# Patient Record
Sex: MALE | Race: BLACK OR AFRICAN AMERICAN | NOT HISPANIC OR LATINO | Employment: OTHER | URBAN - METROPOLITAN AREA
[De-identification: names, ages, dates, MRNs, and addresses within clinical notes are randomized per-mention and may not be internally consistent; named-entity substitution may affect disease eponyms.]

---
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
## 2020-02-23 ENCOUNTER — HEALTH MAINTENANCE LETTER (OUTPATIENT)
Age: 20
End: 2020-02-23

## 2020-12-06 ENCOUNTER — HEALTH MAINTENANCE LETTER (OUTPATIENT)
Age: 20
End: 2020-12-06

## 2021-04-11 ENCOUNTER — HEALTH MAINTENANCE LETTER (OUTPATIENT)
Age: 21
End: 2021-04-11

## 2021-06-09 ENCOUNTER — THERAPY VISIT (OUTPATIENT)
Dept: PHYSICAL THERAPY | Facility: CLINIC | Age: 21
End: 2021-06-09
Payer: COMMERCIAL

## 2021-06-09 DIAGNOSIS — M99.05 SOMATIC DYSFUNCTION OF PELVIS REGION: ICD-10-CM

## 2021-06-09 PROCEDURE — 97110 THERAPEUTIC EXERCISES: CPT | Mod: GP | Performed by: PHYSICAL THERAPIST

## 2021-06-09 PROCEDURE — 97112 NEUROMUSCULAR REEDUCATION: CPT | Mod: GP | Performed by: PHYSICAL THERAPIST

## 2021-06-09 PROCEDURE — 97161 PT EVAL LOW COMPLEX 20 MIN: CPT | Mod: GP | Performed by: PHYSICAL THERAPIST

## 2021-06-09 NOTE — LETTER
[facility]  [address]  [facility] [address]  [phone]    Suma 10, 2021    Re: Octavio Kirkpatrick   :   2000  MRN:  [id number]   REFERRING PHYSICIAN:   Yef[facility]  Date of Initial Evaluation:  2021  Visits:  Rxs Used: 6  Reason for Referral:  Somatic dysfunction of pelvis region    EVALUATION SUMMARY  Physical Therapy Initial Evaluation  Subjective:  Onset of pelvic pain, urinary urgency/frequency 3-21 of unknown etiology. Pt notes intermittent pain  with urinating. Bowel movements, erections and ejaculation are pain free. Pt does note increased muscle tone in the pelvic floor and decreased flexibility in the hips and lower extremities. Pt has noted decrease in symptoms with medication.   The history is provided by the patient. No  was used.     Patient Health History  Octavio Kirkpatrick being seen for pelvic floor therapy.   Problem began: 3/27/2021.   Problem occurred: health issues   Pain is reported as 5/10 on pain scale.  General health as reported by patient is good.  Pertinent medical history includes: chest pain and mental illness. Other medical history details: PE.   Medical allergies: other. Other medical allergies details: Red dye.   Surgeries include:  Other. Other surgery history details: Hernia, gum surgery , tubes in ears tonsils surgery.    Current medications:  Anti-depressants, heparin/coumadin and high blood pressure medication.    Current occupation is done with school to continue on.   Primary job tasks include:  Computer work.     Therapist Generated HPI Evaluation  Type of problem:  Pelvic dysfunction.  This is a new condition.  Condition occurred with:  Insidious onset.  Where condition occurred: for unknown reasons.  Patient reports pain:  Groin.  Pain is described as aching and burning   Radiates to: right testicle and tip of the penis. Pain is the  same all the time.  Since onset symptoms are gradually improving.  Exacerbated by: urinating.  and relieved by rest.      Re: Octavio Kirkpatrick   :   2000    Work activity restrictions: student, recently graduated, not working.  Barriers include:  None as reported by patient.    Objective:  Pelvic Dysfunction Evaluation:      Flexibility:    Tightness present at:Adductors; Iliopsoas; Hamstrings and Piriformis    Abdominal Wall:    Trigger Points:  Iliopsoas and external obliques    External Assessment:    Skin Condition:  Normal  Bearing Down/Coughing:  Normal    Internal Assessment:    Contraction/Grade:  Fair squeeze, definite lift (3)    SEMG Biofeedback:    Equipment:  Biofeedback  Suraface electrode placement--Perianal:  Bilateral   Baseline EMG PM:  15 mV pt was able to decrease resting muscle tone to 3 mV with guided breathing and relaxation exercises    Additional History:  Caffeine Consumption:  0       Assessment/Plan:    Patient is a 20 year old male with pelvic complaints.    Patient has the following significant findings with corresponding treatment plan.                Diagnosis 1:  Pelvic floor dysfunction  Pain -  manual therapy, self management, education and home program  Decreased ROM/flexibility - manual therapy, therapeutic exercise, therapeutic activity and home program  Impaired muscle performance - biofeedback, neuro re-education and home program                                Re: Octavio Kirkpatrick   :   2000        Therapy Evaluation Codes:   1) History comprised of:   Personal factors that impact the plan of care:      None.    Comorbidity factors that impact the plan of care are:      Chest pain, Mental illness and history of pulmonary embolism.     Medications impacting care: Anti-depressant, High blood pressure and Heparin/coumadin.  2) Examination of Body Systems comprised of:   Body structures and functions that impact the plan of care:      Pelvis.   Activity  limitations that impact the plan of care are:      urinating.  3) Clinical presentation characteristics are:   Stable/Uncomplicated.  4) Decision-Making    Low complexity using standardized patient assessment instrument and/or measureable assessment of functional outcome.  Cumulative Therapy Evaluation is: Low complexity.    Previous and current functional limitations:  (See Goal Flow Sheet for this information)    Short term and Long term goals: (See Goal Flow Sheet for this information)   Communication ability:  Patient appears to be able to clearly communicate and understand verbal and written communication and follow directions correctly.  Treatment Explanation - The following has been discussed with the patient:   RX ordered/plan of care  Anticipated outcomes  Possible risks and side effects  This patient would benefit from PT intervention to resume normal activities.   Rehab potential is good.    Frequency:  1 X week, once daily  Duration:  for 6 weeks  Discharge Plan:  Achieve all LTG.  Independent in home treatment program.  Reach maximal therapeutic benefit.    Please refer to the daily flowsheet for treatment today, total treatment time and time spent performing 1:1 timed codes.     Thank you for your referral.    INQUIRIES  Therapist: Demar Loaiza MD   [facility]  [address] [facility] [address]  Phone: [phone]  Fax: [phone]

## 2021-06-09 NOTE — PROGRESS NOTES
Physical Therapy Initial Evaluation  Subjective:    Onset of pelvic pain, urinary urgency/frequency 3-21 of unknown etiology. Pt notes intermittent pain  with urinating. Bowel movements, erections and ejaculation are pain free. Pt does note increased muscle tone in the pelvic floor and decreased flexibility in the hips and lower extremities. Pt has noted decrease in symptoms with medication.     The history is provided by the patient. No  was used.   Patient Health History  Octavio Kirkpatrick being seen for pelvic floor therapy.     Problem began: 3/27/2021.   Problem occurred: health issues   Pain is reported as 5/10 on pain scale.  General health as reported by patient is good.  Pertinent medical history includes: chest pain and mental illness. Other medical history details: PE.     Medical allergies: other. Other medical allergies details: Red dye.   Surgeries include:  Other. Other surgery history details: Hernia, gum surgery , tubes in ears tonsils surgery.    Current medications:  Anti-depressants, heparin/coumadin and high blood pressure medication.    Current occupation is done with school to continue on.   Primary job tasks include:  Computer work.                  Therapist Generated HPI Evaluation         Type of problem:  Pelvic dysfunction.    This is a new condition.  Condition occurred with:  Insidious onset.  Where condition occurred: for unknown reasons.  Patient reports pain:  Groin.  Pain is described as aching and burning   Radiates to: right testicle and tip of the penis. Pain is the same all the time.  Since onset symptoms are gradually improving.  Exacerbated by: urinating.  and relieved by rest.      Work activity restrictions: student, recently graduated, not working.  Barriers include:  None as reported by patient.                        Objective:  System                                 Pelvic Dysfunction Evaluation:        Flexibility:    Tightness present at:Adductors;  Iliopsoas; Hamstrings and Piriformis    Abdominal Wall:      Trigger Points:  Iliopsoas and external obliques          External Assessment:    Skin Condition:  Normal    Bearing Down/Coughing:  Normal        Internal Assessment:      Contraction/Grade:  Fair squeeze, definite lift (3)          SEMG Biofeedback:    Equipment:  Biofeedback    Suraface electrode placement--Perianal:  Bilateral   Baseline EMG PM:  15 mV pt was able to decrease resting muscle tone to 3 mV with guided breathing and relaxation exercises          Additional History:        Caffeine Consumption:  0                     General     ROS    Assessment/Plan:    Patient is a 20 year old male with pelvic complaints.    Patient has the following significant findings with corresponding treatment plan.                Diagnosis 1:  Pelvic floor dysfunction  Pain -  manual therapy, self management, education and home program  Decreased ROM/flexibility - manual therapy, therapeutic exercise, therapeutic activity and home program  Impaired muscle performance - biofeedback, neuro re-education and home program    Therapy Evaluation Codes:   1) History comprised of:   Personal factors that impact the plan of care:      None.    Comorbidity factors that impact the plan of care are:      Chest pain, Mental illness and history of pulmonary embolism.     Medications impacting care: Anti-depressant, High blood pressure and Heparin/coumadin.  2) Examination of Body Systems comprised of:   Body structures and functions that impact the plan of care:      Pelvis.   Activity limitations that impact the plan of care are:      urinating.  3) Clinical presentation characteristics are:   Stable/Uncomplicated.  4) Decision-Making    Low complexity using standardized patient assessment instrument and/or measureable assessment of functional outcome.  Cumulative Therapy Evaluation is: Low complexity.    Previous and current functional limitations:  (See Goal Flow Sheet for  this information)    Short term and Long term goals: (See Goal Flow Sheet for this information)     Communication ability:  Patient appears to be able to clearly communicate and understand verbal and written communication and follow directions correctly.  Treatment Explanation - The following has been discussed with the patient:   RX ordered/plan of care  Anticipated outcomes  Possible risks and side effects  This patient would benefit from PT intervention to resume normal activities.   Rehab potential is good.    Frequency:  1 X week, once daily  Duration:  for 6 weeks  Discharge Plan:  Achieve all LTG.  Independent in home treatment program.  Reach maximal therapeutic benefit.    Please refer to the daily flowsheet for treatment today, total treatment time and time spent performing 1:1 timed codes.

## 2021-06-17 ENCOUNTER — THERAPY VISIT (OUTPATIENT)
Dept: PHYSICAL THERAPY | Facility: CLINIC | Age: 21
End: 2021-06-17
Payer: COMMERCIAL

## 2021-06-17 DIAGNOSIS — M99.05 SOMATIC DYSFUNCTION OF PELVIS REGION: ICD-10-CM

## 2021-06-17 PROCEDURE — 97110 THERAPEUTIC EXERCISES: CPT | Mod: GP | Performed by: PHYSICAL THERAPIST

## 2021-06-17 PROCEDURE — 97112 NEUROMUSCULAR REEDUCATION: CPT | Mod: GP | Performed by: PHYSICAL THERAPIST

## 2021-06-23 ENCOUNTER — NURSE TRIAGE (OUTPATIENT)
Dept: NURSING | Facility: CLINIC | Age: 21
End: 2021-06-23

## 2021-06-23 ENCOUNTER — THERAPY VISIT (OUTPATIENT)
Dept: PHYSICAL THERAPY | Facility: CLINIC | Age: 21
End: 2021-06-23
Payer: COMMERCIAL

## 2021-06-23 DIAGNOSIS — M99.05 SOMATIC DYSFUNCTION OF PELVIS REGION: ICD-10-CM

## 2021-06-23 PROCEDURE — 97110 THERAPEUTIC EXERCISES: CPT | Mod: GP | Performed by: PHYSICAL THERAPIST

## 2021-06-23 PROCEDURE — 97112 NEUROMUSCULAR REEDUCATION: CPT | Mod: GP | Performed by: PHYSICAL THERAPIST

## 2021-06-23 NOTE — TELEPHONE ENCOUNTER
Triage Call:    He is on Warfarin and he took x2 ibuprophen yesterday.  He is concerned about more bleeding based on what he read online     Pt was advised of protocol recommendation/disposition of homecare and monitoring for any bleeding.  Advised to call back if does have any bleeding occur. .     Camille Barnes RN on 6/23/2021 at 5:40 PM        COVID 19 Nurse Triage Plan/Patient Instructions    Please be aware that novel coronavirus (COVID-19) may be circulating in the community. If you develop symptoms such as fever, cough, or SOB or if you have concerns about the presence of another infection including coronavirus (COVID-19), please contact your health care provider or visit [email or web address].     Disposition/Instructions    Home care recommended. Follow home care protocol based instructions.    Thank you for taking steps to prevent the spread of this virus.  o Limit your contact with others.  o Wear a simple mask to cover your cough.  o Wash your hands well and often.    Resources    [facility]: About COVID-19: [email or web address]    CDC: What to Do If You're Sick: www.cdc.gov/coronavirus/2019-ncov/about/steps-when-sick.html    CDC: Ending Home Isolation: www.cdc.gov/coronavirus/2019-ncov/hcp/disposition-in-home-patients.html     CDC: Caring for Someone: www.cdc.gov/coronavirus/2019-ncov/if-you-are-sick/care-for-someone.html     [facility]: Interim Guidance for Hospital Discharge to Home: www.health.[facility].[place].us/diseases/coronavirus/hcp/hospdischarge.pdf    [facility] clinical trials (COVID-19 research studies): [email or web address]/n-clinical-trials     Below are the COVID-19 hotlines at the [facility] ([facility]). Interpreters are available.   o For health questions: Call 794-861-5394 or 1-558.429.1859 (7 a.m. to 7 p.m.)  o For questions about schools and childcare: Call 829-281-6808 or 1-705.431.7836 (7 a.m. to 7 p.m.)                   Reason for Disposition     "Caller has medication question only, adult not sick, and triager answers question    Additional Information    Negative: Drug overdose and triager unable to answer question    Negative: Caller requesting information unrelated to medicine    Negative: Caller requesting a prescription for Strep throat and has a positive culture result    Negative: Rash while taking a medication or within 3 days of stopping it    Negative: Immunization reaction suspected    Negative: [1] Asthma and [2] having symptoms of asthma (cough, wheezing, etc.)    Negative: [1] Influenza symptoms AND [2] anti-viral med prescription request, such as Tamiflu    Negative: [1] Symptom of illness (e.g., headache, abdominal pain, earache, vomiting) AND [2] more than mild    Negative: MORE THAN A DOUBLE DOSE of a prescription or over-the-counter (OTC) drug    Negative: [1] DOUBLE DOSE (an extra dose or lesser amount) of over-the-counter (OTC) drug AND [2] any symptoms (e.g., dizziness, nausea, pain, sleepiness)    Negative: [1] DOUBLE DOSE (an extra dose or lesser amount) of prescription drug AND [2] any symptoms (e.g., dizziness, nausea, pain, sleepiness)    Negative: Took another person's prescription drug    Negative: [1] DOUBLE DOSE (an extra dose or lesser amount) of prescription drug AND [2] NO symptoms (Exception: a double dose of antibiotics)    Negative: Diabetes drug error or overdose (e.g., took wrong type of insulin or took extra dose)    Negative: [1] Request for URGENT new prescription or refill of \"essential\" medication (i.e., likelihood of harm to patient if not taken) AND [2] triager unable to fill per unit policy    Negative: [1] Prescription not at pharmacy AND [2] was prescribed by PCP recently    Negative: [1] Pharmacy calling with prescription questions AND [2] triager unable to answer question    Negative: [1] Caller has URGENT medication question about med that PCP or specialist prescribed AND [2] triager unable to answer " question    Negative: [1] Caller has NON-URGENT medication question about med that PCP prescribed AND [2] triager unable to answer question    Negative: [1] Caller requesting a NON-URGENT new prescription or refill AND [2] triager unable to refill per unit policy    Negative: [1] Caller has medication question about med not prescribed by PCP AND [2] triager unable to answer question (e.g., compatibility with other med, storage)    Negative: Caller requesting a CONTROLLED substance prescription refill (e.g., narcotics, ADHD medicines)    Negative: Caller wants to use a complementary or alternative medicine    Negative: [1] Prescription prescribed recently is not at pharmacy AND [2] triager has access to patient's EMR AND [3] prescription is recorded in the EMR    Negative: [1] DOUBLE DOSE (an extra dose or lesser amount) of over-the-counter (OTC) drug AND [2] NO symptoms    Negative: [1] DOUBLE DOSE (an extra dose or lesser amount) of antibiotic drug AND [2] NO symptoms    Protocols used: MEDICATION QUESTION CALL-A-AH

## 2021-06-23 NOTE — PROGRESS NOTES
Subjective:  HPI  Physical Exam                    Objective:  System    Physical Exam    General     ROS    Assessment/Plan:    SUBJECTIVE  Subjective changes as noted by pt:  Pt notes decreased pain and decrease in frequency of urination     Current pain level: 2/10     Changes in function:  Pt notes decrease in the frequency of urination      Adverse reaction to treatment or activity:  None    OBJECTIVE  Changes in objective findings:  Pt demonstrates improved flexibility in the hamstrings, hip abductors and piriformis . Pt demonstrates improved ability to relax pelvic floor muscles        ASSESSMENT  Octavio continues to require intervention to meet STG and LTG's: PT  Patient's symptoms are resolving.  Response to therapy has shown an improvement in  pain level and pelvic tightness   Progress made towards STG/LTG?  Yes,     PLAN  Current treatment program is being advanced to more complex exercises.    PTA/ATC plan:  N/A    Please refer to the daily flowsheet for treatment today, total treatment time and time spent performing 1:1 timed codes.

## 2021-07-07 ENCOUNTER — THERAPY VISIT (OUTPATIENT)
Dept: PHYSICAL THERAPY | Facility: CLINIC | Age: 21
End: 2021-07-07
Payer: COMMERCIAL

## 2021-07-07 DIAGNOSIS — M99.05 SOMATIC DYSFUNCTION OF PELVIS REGION: ICD-10-CM

## 2021-07-07 PROCEDURE — 97110 THERAPEUTIC EXERCISES: CPT | Mod: GP | Performed by: PHYSICAL THERAPIST

## 2021-07-07 PROCEDURE — 97112 NEUROMUSCULAR REEDUCATION: CPT | Mod: GP | Performed by: PHYSICAL THERAPIST

## 2021-07-07 NOTE — PROGRESS NOTES
Subjective:  HPI  Physical Exam                    Objective:  System    Physical Exam    General     ROS    Assessment/Plan:    DISCHARGE REPORT    Progress reporting period is from 6-9-21 to 7-7-21.       SUBJECTIVE  Subjective changes noted by patient:  Pt notes decreased pain and increased flexibility in the hips and pelvic region.       Current pain level is 0/10  .     Previous pain level was  5/10  .   Changes in function:  Pt denies pain with urination, bowel movements, erections and ejaculation. Pt reports being pain free with all ADL's  Adverse reaction to treatment or activity: None    OBJECTIVE  Changes noted in objective findings:  Pt demonstrates improved flexibility in hip flexors, hamstrings , hip adductors and piriformis but still remains tighter than normal. Pt was able to to decrease the resting muscle tone with biofeedback from 15 mV to 2 mV        ASSESSMENT/PLAN  Updated problem list and treatment plan: Diagnosis 1:  Pelvic pain  Pain -  self management, education, home program and biofeedback  Decreased ROM/flexibility - therapeutic exercise, therapeutic activity and home program  Impaired muscle performance - biofeedback, neuro re-education and home program  STG/LTGs have been met or progress has been made towards goals:  Yes,   Assessment of Progress: The patient's condition is improving.  Self Management Plans:  Patient has been instructed in a home treatment program.  I have re-evaluated this patient and find that the nature, scope, duration and intensity of the therapy is appropriate for the medical condition of the patient.  Octavio continues to require the following intervention to meet STG and LTG's:  PT intervention is no longer required to meet STG/LTG.    Recommendations:  This patient is ready to be discharged from therapy and continue their home treatment program.    Please refer to the daily flowsheet for treatment today, total treatment time and time spent performing 1:1 timed  codes.

## 2021-07-22 ENCOUNTER — OFFICE VISIT (OUTPATIENT)
Dept: URGENT CARE | Facility: URGENT CARE | Age: 21
End: 2021-07-22
Payer: COMMERCIAL

## 2021-07-22 VITALS
TEMPERATURE: 97.8 F | HEART RATE: 82 BPM | WEIGHT: 223.2 LBS | DIASTOLIC BLOOD PRESSURE: 60 MMHG | OXYGEN SATURATION: 96 % | BODY MASS INDEX: 31.13 KG/M2 | RESPIRATION RATE: 16 BRPM | SYSTOLIC BLOOD PRESSURE: 120 MMHG

## 2021-07-22 DIAGNOSIS — N48.89 SORE OF PENIS: Primary | ICD-10-CM

## 2021-07-22 PROCEDURE — 99203 OFFICE O/P NEW LOW 30 MIN: CPT | Performed by: STUDENT IN AN ORGANIZED HEALTH CARE EDUCATION/TRAINING PROGRAM

## 2021-07-22 NOTE — PROGRESS NOTES
Patient presents with:  Sore      Clinical Decision Making:      ICD-10-CM    1. Sore of penis  N48.89      As he is not and has not been sexually active in the past I will not obtain an STI work-up. Exam is also in-consistent with an ulcer but rather an area of redness that appears to be 2/2 friction. Will treat with lubrication like Vaseline on his penis during the day.     HPI:  Octavio Kirkpatrick is a 20 year old male who presents today complaining of a sore on his penis for the last week. There Is no drainage from the sore. It has not changed in the last week. No fever or chills. He is not sexually active and never has been.     History obtained from the patient.    Problem List:  2021-06: Somatic dysfunction of pelvis region  2018-11: Elevated serum creatinine      Past Medical History:   Diagnosis Date     ADHD      Anxiety      Autistic disorder      Heart murmur        Social History     Tobacco Use     Smoking status: Never Smoker     Smokeless tobacco: Never Used   Substance Use Topics     Alcohol use: No           Vitals:    07/22/21 1431   BP: 120/60   BP Location: Right arm   Patient Position: Chair   Cuff Size: Adult Large   Pulse: 82   Resp: 16   Temp: 97.8  F (36.6  C)   TempSrc: Oral   SpO2: 96%   Weight: 101.2 kg (223 lb 3.2 oz)       Physical Exam  Musculoskeletal:      Comments: Mild redness friction of under the glans of the penis without any ulcer, bleeding, or scab.          At the end of the encounter, I discussed results, diagnosis, medications. Discussed red flags for immediate return to clinic/ER, as well as indications for follow up if no improvement. Patient understood and agreed to plan. Patient was stable for discharge.

## 2021-09-25 ENCOUNTER — HEALTH MAINTENANCE LETTER (OUTPATIENT)
Age: 21
End: 2021-09-25

## 2021-09-27 ENCOUNTER — HOSPITAL ENCOUNTER (EMERGENCY)
Facility: CLINIC | Age: 21
Discharge: HOME | End: 2021-09-28
Attending: EMERGENCY MEDICINE | Admitting: EMERGENCY MEDICINE
Payer: COMMERCIAL

## 2021-09-27 ENCOUNTER — APPOINTMENT (OUTPATIENT)
Dept: GENERAL RADIOLOGY | Facility: CLINIC | Age: 21
End: 2021-09-27
Attending: EMERGENCY MEDICINE
Payer: COMMERCIAL

## 2021-09-27 VITALS
HEART RATE: 84 BPM | DIASTOLIC BLOOD PRESSURE: 97 MMHG | WEIGHT: 223 LBS | BODY MASS INDEX: 31.1 KG/M2 | SYSTOLIC BLOOD PRESSURE: 143 MMHG | TEMPERATURE: 97.7 F | RESPIRATION RATE: 18 BRPM | OXYGEN SATURATION: 97 %

## 2021-09-27 DIAGNOSIS — S69.91XA INJURY OF RIGHT THUMB, INITIAL ENCOUNTER: ICD-10-CM

## 2021-09-27 PROCEDURE — 250N000013 HC RX MED GY IP 250 OP 250 PS 637: Performed by: EMERGENCY MEDICINE

## 2021-09-27 PROCEDURE — 73140 X-RAY EXAM OF FINGER(S): CPT | Mod: RT

## 2021-09-27 PROCEDURE — 29130 APPL FINGER SPLINT STATIC: CPT | Mod: F5

## 2021-09-27 PROCEDURE — 99284 EMERGENCY DEPT VISIT MOD MDM: CPT

## 2021-09-27 RX ORDER — IBUPROFEN 600 MG/1
600 TABLET, FILM COATED ORAL ONCE
Status: COMPLETED | OUTPATIENT
Start: 2021-09-27 | End: 2021-09-27

## 2021-09-27 RX ADMIN — IBUPROFEN 600 MG: 600 TABLET ORAL at 23:52

## 2021-09-27 ASSESSMENT — ENCOUNTER SYMPTOMS: ARTHRALGIAS: 1

## 2021-09-28 NOTE — ED TRIAGE NOTES
Pt arrives to the ED due to right thumb pain after using it to push down on a piano bench. Pt states that when he went to go push on the piano bench he felt a pop sensation in the thumb. Pain in thumb since. CMS intact.

## 2021-09-28 NOTE — ED NOTES
Patient discharged home with self. Vital signs stable at discharge. Education provided regarding op f/u. Pt verbalized understanding. All questions answered.

## 2021-09-28 NOTE — ED PROVIDER NOTES
History   Chief Complaint:  Thumb Discomfort       HPI   Octavio Kirkpatrick is a left handed 20 year old male who presents with right thumb pain. The patient was pushing down on a piano bench when he felt a pop in his thumb. He is able to move it, though notes pain about the IP joint.    Review of Systems   Musculoskeletal: Positive for arthralgias (Thumb).     Allergies:  Daily products  Gluten Meal    Medications:  Abilify  Hydroxyzine  Lithium  Concerta  Seroquel  Zoloft  Privinil  Lamisil  Coumadin  Mobic  Gabapentin  Sertraline     Past Medical History:    ADHD  Anxiety  Autism  Heart murmur  Elevated serum creatinine  Fetal alcohol syndrome  Pulmonary embolism  Acute injury of kidney  Acute respiratory failure with hypoxia     Past Surgical History:    Tonsillectomy  Hernia repair  Adenoidectomy  Tympanostomy     Family History:    The patient denies past family history.     Social History:  The patient presents alone. He plays piano.    Physical Exam     Patient Vitals for the past 24 hrs:   BP Temp Temp src Pulse Resp SpO2 Weight   09/27/21 2339 (!) 143/97 97.7  F (36.5  C) Oral 84 -- 97 % --   09/27/21 2255 (!) 132/90 97.3  F (36.3  C) Oral 95 18 100 % 101.2 kg (223 lb)       Physical Exam  General:   Well-nourished   Speaking in full sentences  Eyes:   Conjunctiva without injection or scleral icterus  Resp:   Even, non-labored respirations  CV:    RRR  Skin:   Warm, dry, well perfused   No rashes or open wounds on exposed skin  MSK:   R Hand:   Tenderness to palpation about IP joint of right hand   Flexion/extension maintained at MCP and IP joint   Cap refill <3 sec distally   No open wounds or bleeding  Neuro:   Alert   Answers questions appropriately   Moves all extremities equally   Gait stable  Psych:   Normal affect, normal mood    Emergency Department Course     Imaging:  XR Finger Right G/E 2 Views  Normal right thumb joint spaces and alignment. No fracture. Tiny foreign body along the skin surface  distal thumb.   As read by Radiology.    Emergency Department Course:    Reviewed:  I reviewed nursing notes, vitals, past medical history and care everywhere    Assessments:  2346 I obtained history and examined the patient as noted above. I explained findings.     Interventions:  2352 Ibuprofen, 600 mg, PO    Disposition:  The patient was discharged to home.       Impression & Plan     CMS Diagnoses: None    Medical Decision Making:  Octavio Kirkpatrick is a 20 yr old M presenting to the ER for evaluation of right thumb discomfort.  VS on presentation reveal elevated BP.  Exam most suspicious for ligament strain.  XR negative for fracture or dislocation.  Note made of FB on surface of skin, which was identified and removed on exam.  No open wounds.  No evidence of complete flexor or extensor tendon disruption.  No neurovascular compromise distally.  Will plan in finger splint, and recommended supportive cares including acetaminophen, limited use of finger, and follow-up with Hand Surgery, particularly if symptoms are worsening.  Patient comfortable with outlined plan of care and questions answered prior to DC.    Diagnosis:    ICD-10-CM    1. Injury of right thumb, initial encounter  S69.91XA        Scribe Disclosure:  Jenifer JEFFERY, am serving as a scribe at 11:46 PM on 9/27/2021 to document services personally performed by Jaspreet Lou MD based on my observations and the provider's statements to me.            Jaspreet Lou MD  09/28/21 1460

## 2021-09-28 NOTE — DISCHARGE INSTRUCTIONS
Continue with the splint for your thumb to help support your thumb.  You may continue with Tylenol as needed for pain.    If symptoms or not improving, please follow-up with [facility] orthopedics hand specialist with the number provided in your discharge paperwork.

## 2021-12-22 ENCOUNTER — HOSPITAL ENCOUNTER (EMERGENCY)
Facility: CLINIC | Age: 21
Discharge: NURSING, REHAB OR LONG-TERM CARE | End: 2021-12-23
Attending: EMERGENCY MEDICINE | Admitting: EMERGENCY MEDICINE
Payer: COMMERCIAL

## 2021-12-22 ENCOUNTER — APPOINTMENT (OUTPATIENT)
Dept: GENERAL RADIOLOGY | Facility: CLINIC | Age: 21
End: 2021-12-22
Attending: EMERGENCY MEDICINE
Payer: COMMERCIAL

## 2021-12-22 DIAGNOSIS — T19.0XXA FOREIGN BODY IN URETHRA, INITIAL ENCOUNTER: ICD-10-CM

## 2021-12-22 DIAGNOSIS — R45.851 SUICIDAL IDEATION: ICD-10-CM

## 2021-12-22 LAB
ALBUMIN SERPL-MCNC: 4.4 G/DL (ref 3.4–5)
ALBUMIN UR-MCNC: 30 MG/DL
ALP SERPL-CCNC: 88 U/L (ref 40–150)
ALT SERPL W P-5'-P-CCNC: 30 U/L (ref 0–70)
AMPHETAMINES UR QL SCN: NORMAL
ANION GAP SERPL CALCULATED.3IONS-SCNC: 6 MMOL/L (ref 3–14)
APAP SERPL-MCNC: <2 MG/L (ref 10–30)
APPEARANCE UR: CLEAR
AST SERPL W P-5'-P-CCNC: 17 U/L (ref 0–45)
BARBITURATES UR QL: NORMAL
BASOPHILS # BLD AUTO: 0 10E3/UL (ref 0–0.2)
BASOPHILS NFR BLD AUTO: 0 %
BENZODIAZ UR QL: NORMAL
BILIRUB SERPL-MCNC: 0.6 MG/DL (ref 0.2–1.3)
BILIRUB UR QL STRIP: NEGATIVE
BUN SERPL-MCNC: 13 MG/DL (ref 7–30)
CALCIUM SERPL-MCNC: 9.4 MG/DL (ref 8.5–10.1)
CANNABINOIDS UR QL SCN: NORMAL
CHLORIDE BLD-SCNC: 106 MMOL/L (ref 94–109)
CO2 SERPL-SCNC: 27 MMOL/L (ref 20–32)
COCAINE UR QL: NORMAL
COLOR UR AUTO: YELLOW
CREAT SERPL-MCNC: 1.5 MG/DL (ref 0.66–1.25)
EOSINOPHIL # BLD AUTO: 0.1 10E3/UL (ref 0–0.7)
EOSINOPHIL NFR BLD AUTO: 1 %
ERYTHROCYTE [DISTWIDTH] IN BLOOD BY AUTOMATED COUNT: 12.7 % (ref 10–15)
ETHANOL SERPL-MCNC: <0.01 G/DL
GFR SERPL CREATININE-BSD FRML MDRD: 67 ML/MIN/1.73M2
GLUCOSE BLD-MCNC: 95 MG/DL (ref 70–99)
GLUCOSE UR STRIP-MCNC: NEGATIVE MG/DL
HCT VFR BLD AUTO: 50 % (ref 40–53)
HGB BLD-MCNC: 16.7 G/DL (ref 13.3–17.7)
HGB UR QL STRIP: NEGATIVE
IMM GRANULOCYTES # BLD: 0 10E3/UL
IMM GRANULOCYTES NFR BLD: 0 %
INR PPP: 1.47 (ref 0.85–1.15)
KETONES UR STRIP-MCNC: 20 MG/DL
LEUKOCYTE ESTERASE UR QL STRIP: NEGATIVE
LYMPHOCYTES # BLD AUTO: 1.3 10E3/UL (ref 0.8–5.3)
LYMPHOCYTES NFR BLD AUTO: 29 %
MCH RBC QN AUTO: 30.9 PG (ref 26.5–33)
MCHC RBC AUTO-ENTMCNC: 33.4 G/DL (ref 31.5–36.5)
MCV RBC AUTO: 92 FL (ref 78–100)
MONOCYTES # BLD AUTO: 0.2 10E3/UL (ref 0–1.3)
MONOCYTES NFR BLD AUTO: 5 %
MUCOUS THREADS #/AREA URNS LPF: PRESENT /LPF
NEUTROPHILS # BLD AUTO: 2.8 10E3/UL (ref 1.6–8.3)
NEUTROPHILS NFR BLD AUTO: 65 %
NITRATE UR QL: NEGATIVE
NRBC # BLD AUTO: 0 10E3/UL
NRBC BLD AUTO-RTO: 0 /100
OPIATES UR QL SCN: NORMAL
PH UR STRIP: 6.5 [PH] (ref 5–7)
PLATELET # BLD AUTO: 282 10E3/UL (ref 150–450)
POTASSIUM BLD-SCNC: 4 MMOL/L (ref 3.4–5.3)
PROT SERPL-MCNC: 8.1 G/DL (ref 6.8–8.8)
RBC # BLD AUTO: 5.41 10E6/UL (ref 4.4–5.9)
RBC URINE: 4 /HPF
SALICYLATES SERPL-MCNC: <2 MG/DL
SODIUM SERPL-SCNC: 139 MMOL/L (ref 133–144)
SP GR UR STRIP: 1.02 (ref 1–1.03)
SPERM #/AREA URNS HPF: PRESENT /HPF
TROPONIN I SERPL HS-MCNC: 8 NG/L
UROBILINOGEN UR STRIP-MCNC: NORMAL MG/DL
WBC # BLD AUTO: 4.5 10E3/UL (ref 4–11)
WBC URINE: 1 /HPF

## 2021-12-22 PROCEDURE — 36415 COLL VENOUS BLD VENIPUNCTURE: CPT | Performed by: EMERGENCY MEDICINE

## 2021-12-22 PROCEDURE — 84484 ASSAY OF TROPONIN QUANT: CPT | Performed by: EMERGENCY MEDICINE

## 2021-12-22 PROCEDURE — 93005 ELECTROCARDIOGRAM TRACING: CPT

## 2021-12-22 PROCEDURE — 90791 PSYCH DIAGNOSTIC EVALUATION: CPT

## 2021-12-22 PROCEDURE — 82040 ASSAY OF SERUM ALBUMIN: CPT | Performed by: EMERGENCY MEDICINE

## 2021-12-22 PROCEDURE — 80143 DRUG ASSAY ACETAMINOPHEN: CPT | Performed by: EMERGENCY MEDICINE

## 2021-12-22 PROCEDURE — 81001 URINALYSIS AUTO W/SCOPE: CPT | Performed by: EMERGENCY MEDICINE

## 2021-12-22 PROCEDURE — 80179 DRUG ASSAY SALICYLATE: CPT | Performed by: EMERGENCY MEDICINE

## 2021-12-22 PROCEDURE — 80307 DRUG TEST PRSMV CHEM ANLYZR: CPT | Performed by: EMERGENCY MEDICINE

## 2021-12-22 PROCEDURE — 85610 PROTHROMBIN TIME: CPT | Performed by: EMERGENCY MEDICINE

## 2021-12-22 PROCEDURE — 80178 ASSAY OF LITHIUM: CPT | Performed by: EMERGENCY MEDICINE

## 2021-12-22 PROCEDURE — 82077 ASSAY SPEC XCP UR&BREATH IA: CPT | Performed by: EMERGENCY MEDICINE

## 2021-12-22 PROCEDURE — 99285 EMERGENCY DEPT VISIT HI MDM: CPT | Mod: 25

## 2021-12-22 PROCEDURE — 74019 RADEX ABDOMEN 2 VIEWS: CPT

## 2021-12-22 PROCEDURE — 85004 AUTOMATED DIFF WBC COUNT: CPT | Performed by: EMERGENCY MEDICINE

## 2021-12-23 VITALS
DIASTOLIC BLOOD PRESSURE: 88 MMHG | OXYGEN SATURATION: 99 % | HEART RATE: 83 BPM | RESPIRATION RATE: 18 BRPM | SYSTOLIC BLOOD PRESSURE: 132 MMHG | TEMPERATURE: 98.3 F

## 2021-12-23 LAB
ATRIAL RATE - MUSE: 108 BPM
DIASTOLIC BLOOD PRESSURE - MUSE: NORMAL MMHG
INTERPRETATION ECG - MUSE: NORMAL
LITHIUM SERPL-SCNC: <0.2 MMOL/L
P AXIS - MUSE: 55 DEGREES
PR INTERVAL - MUSE: 138 MS
QRS DURATION - MUSE: 84 MS
QT - MUSE: 338 MS
QTC - MUSE: 452 MS
R AXIS - MUSE: 60 DEGREES
SYSTOLIC BLOOD PRESSURE - MUSE: NORMAL MMHG
T AXIS - MUSE: 30 DEGREES
VENTRICULAR RATE- MUSE: 108 BPM

## 2021-12-23 RX ORDER — METHYLPHENIDATE HYDROCHLORIDE 36 MG/1
36 TABLET ORAL EVERY MORNING
COMMUNITY

## 2021-12-23 RX ORDER — GABAPENTIN 100 MG/1
100 CAPSULE ORAL 3 TIMES DAILY
COMMUNITY
End: 2024-05-22

## 2021-12-23 RX ORDER — LISINOPRIL 5 MG/1
5 TABLET ORAL DAILY
COMMUNITY
End: 2023-01-26

## 2021-12-23 RX ORDER — MELOXICAM 7.5 MG/1
7.5 TABLET ORAL DAILY
COMMUNITY
End: 2023-01-26

## 2021-12-23 RX ORDER — WARFARIN SODIUM 5 MG/1
TABLET ORAL DAILY
COMMUNITY
End: 2023-01-26

## 2021-12-23 RX ORDER — FESOTERODINE FUMARATE 8 MG/1
8 TABLET, FILM COATED, EXTENDED RELEASE ORAL DAILY
COMMUNITY
End: 2024-03-14

## 2021-12-23 ASSESSMENT — ENCOUNTER SYMPTOMS
CONSTIPATION: 0
NAUSEA: 0
VOMITING: 0
DYSURIA: 0
ABDOMINAL PAIN: 0
WOUND: 0
HEMATURIA: 0
DIARRHEA: 0
FEVER: 0
CHILLS: 0

## 2021-12-23 NOTE — ED NOTES
[facility] ED Behavioral Health Handoff Note:       Brief HPI:  This is a 21 year old male signed out to me by Dr. Ornelas.  See initial ED Provider note for details of the presentation.     Patient is medically cleared for admission to a Behavioral Health unit.      Pending studies:NA      Hold Status:  Active Orders   Legal    Emergency Hospitalization Hold (72 Hr Hold)     Frequency: Effective Now     Start Date/Time: 12/23/21 0713      Number of Occurrences: Until Specified    Health Officer Authority to Detain (DK)     Frequency: Effective Now     Start Date/Time: 12/22/21 1916      Number of Occurrences: Until Specified     Order Comments: This patient presented with circumstances that have led me to be reasonably suspicious that the patient is at significant risk of self-harm. The patient's judgment to this situation appears to be impaired. Given the circumstances in which the patient presented, it is likely that the patient is at significant risk of attempting self harm if this situation is not investigated further. I am highly concerned that the patient is mentally ill and currently cannot safely care for oneself. This represents endangerment to the patient's well-being and safety, and I am placing a Health Officer Authority hold upon the patient at this time.             The patient has not required medication for agitation.    The patient's home medications have not been reviewed and ordered/administered.    Exam:   Temp:  [98.3  F (36.8  C)] 98.3  F (36.8  C)  Pulse:  [] 99  Resp:  [16] 16  BP: (131-148)/() 148/91  SpO2:  [95 %-100 %] 95 %    Nursing note and vitals reviewed.    Constitutional: Pleasant and well groomed.          Pulmonary/Chest: Effort normal    Neurological:Alert.   Skin: Skin is warm and dry.   Psychiatric: Normal mood and affect. Smiling. Reached out to shake hand.       ED Course:    Medications - No data to display     0805: consulted with Chay who has  assessed patient. Refer to report for full assessment. Chronic SI. Lives in [facility] home with 24/7 oversight. Staff is happy to take him back.     There were no significant events while under my care.      0830: Will discontinue hold when staff arrive. RN calling to home for transportation home.       Impression:    ICD-10-CM    1. Suicidal ideation  R45.851    2. Foreign body in urethra, initial encounter  T19.0XXA        Plan:    Discharge home      RESULTS:   Results for orders placed or performed during the hospital encounter of 12/22/21 (from the past 24 hour(s))   CBC with platelets differential     Status: None    Collection Time: 12/22/21  7:32 PM    Narrative    The following orders were created for panel order CBC with platelets differential.  Procedure                               Abnormality         Status                     ---------                               -----------         ------                     CBC with platelets and d...[[id number]]                      Final result                 Please view results for these tests on the individual orders.   Comprehensive metabolic panel     Status: Abnormal    Collection Time: 12/22/21  7:32 PM   Result Value Ref Range    Sodium 139 133 - 144 mmol/L    Potassium 4.0 3.4 - 5.3 mmol/L    Chloride 106 94 - 109 mmol/L    Carbon Dioxide (CO2) 27 20 - 32 mmol/L    Anion Gap 6 3 - 14 mmol/L    Urea Nitrogen 13 7 - 30 mg/dL    Creatinine 1.50 (H) 0.66 - 1.25 mg/dL    Calcium 9.4 8.5 - 10.1 mg/dL    Glucose 95 70 - 99 mg/dL    Alkaline Phosphatase 88 40 - 150 U/L    AST 17 0 - 45 U/L    ALT 30 0 - 70 U/L    Protein Total 8.1 6.8 - 8.8 g/dL    Albumin 4.4 3.4 - 5.0 g/dL    Bilirubin Total 0.6 0.2 - 1.3 mg/dL    GFR Estimate 67 >60 mL/min/1.73m2   Salicylate level     Status: Normal    Collection Time: 12/22/21  7:32 PM   Result Value Ref Range    Salicylate <2 <20 mg/dL   Acetaminophen level     Status: Abnormal    Collection Time: 12/22/21  7:32 PM   Result  Value Ref Range    Acetaminophen <2 (L) 10 - 30 mg/L   Ethyl Alcohol Level     Status: Normal    Collection Time: 12/22/21  7:32 PM   Result Value Ref Range    Alcohol ethyl <0.01 <=0.01 g/dL   CBC with platelets and differential     Status: None    Collection Time: 12/22/21  7:32 PM   Result Value Ref Range    WBC Count 4.5 4.0 - 11.0 10e3/uL    RBC Count 5.41 4.40 - 5.90 10e6/uL    Hemoglobin 16.7 13.3 - 17.7 g/dL    Hematocrit 50.0 40.0 - 53.0 %    MCV 92 78 - 100 fL    MCH 30.9 26.5 - 33.0 pg    MCHC 33.4 31.5 - 36.5 g/dL    RDW 12.7 10.0 - 15.0 %    Platelet Count 282 150 - 450 10e3/uL    % Neutrophils 65 %    % Lymphocytes 29 %    % Monocytes 5 %    % Eosinophils 1 %    % Basophils 0 %    % Immature Granulocytes 0 %    NRBCs per 100 WBC 0 <1 /100    Absolute Neutrophils 2.8 1.6 - 8.3 10e3/uL    Absolute Lymphocytes 1.3 0.8 - 5.3 10e3/uL    Absolute Monocytes 0.2 0.0 - 1.3 10e3/uL    Absolute Eosinophils 0.1 0.0 - 0.7 10e3/uL    Absolute Basophils 0.0 0.0 - 0.2 10e3/uL    Absolute Immature Granulocytes 0.0 <=0.4 10e3/uL    Absolute NRBCs 0.0 10e3/uL   Troponin I     Status: Normal    Collection Time: 12/22/21  7:32 PM   Result Value Ref Range    Troponin I High Sensitivity 8 <79 ng/L   Lithium level     Status: Normal    Collection Time: 12/22/21  7:32 PM   Result Value Ref Range    Lithium <0.2   mmol/L   UA with Microscopic reflex to Culture     Status: Abnormal    Collection Time: 12/22/21  7:33 PM    Specimen: Urine, Midstream   Result Value Ref Range    Color Urine Yellow Colorless, Straw, Light Yellow, Yellow    Appearance Urine Clear Clear    Glucose Urine Negative Negative mg/dL    Bilirubin Urine Negative Negative    Ketones Urine 20  (A) Negative mg/dL    Specific Gravity Urine 1.024 1.003 - 1.035    Blood Urine Negative Negative    pH Urine 6.5 5.0 - 7.0    Protein Albumin Urine 30  (A) Negative mg/dL    Urobilinogen Urine Normal Normal, 2.0 mg/dL    Nitrite Urine Negative Negative    Leukocyte  Esterase Urine Negative Negative    Mucus Urine Present (A) None Seen /LPF    Sperm Urine Present (A) None Seen /HPF    RBC Urine 4 (H) <=2 /HPF    WBC Urine 1 <=5 /HPF    Narrative    Urine Culture not indicated   Urine Drugs of Abuse Screen     Status: Normal    Collection Time: 12/22/21  7:33 PM    Narrative    The following orders were created for panel order Urine Drugs of Abuse Screen.  Procedure                               Abnormality         Status                     ---------                               -----------         ------                     Drug abuse screen 1 urin...[[id number]]  Normal              Final result                 Please view results for these tests on the individual orders.   Drug abuse screen 1 urine (ED)     Status: Normal    Collection Time: 12/22/21  7:33 PM   Result Value Ref Range    Amphetamines Urine Screen Negative Screen Negative    Barbiturates Urine Screen Negative Screen Negative    Benzodiazepines Urine Screen Negative Screen Negative    Cannabinoids Urine Screen Negative Screen Negative    Cocaine Urine Screen Negative Screen Negative    Opiates Urine Screen Negative Screen Negative   INR     Status: Abnormal    Collection Time: 12/22/21  7:40 PM   Result Value Ref Range    INR 1.47 (H) 0.85 - 1.15   EKG 12-lead, tracing only     Status: None    Collection Time: 12/22/21  7:42 PM   Result Value Ref Range    Systolic Blood Pressure  mmHg    Diastolic Blood Pressure  mmHg    Ventricular Rate 108 BPM    Atrial Rate 108 BPM    NH Interval 138 ms    QRS Duration 84 ms     ms    QTc 452 ms    P Axis 55 degrees    R AXIS 60 degrees    T Axis 30 degrees    Interpretation ECG       Sinus tachycardia  Nonspecific T wave abnormality  Abnormal ECG  No previous ECGs available     Abdomen XR, 2 vw, flat and upright     Status: None    Collection Time: 12/22/21  8:56 PM    Narrative    EXAM: XR ABDOMEN 2VIEWS  LOCATION: [facility]  DATE/TIME:  12/22/2021 8:56 PM    INDICATION: urethral foreign body, eval for any retained urinary foreign body  COMPARISON: None.      Impression    IMPRESSION: Nonobstructive bowel gas pattern. No definite free air. No definite renal stone. Small left-sided pelvic phlebolith. No radiodense foreign body is identified within the abdomen or visualized pelvis.              MD Rahel Joseph, Mira Myles MD  12/23/21 0964

## 2021-12-23 NOTE — ED PROVIDER NOTES
I received patient in signout from Dr. Yip.  Please refer to their complete H&P for further information.  Briefly, patient presented with suicidal ideations from group home; also concern for possible urethral FB though xray unremarkable. At time of signout, DEC pending.     7:00AM  No acute events overnight.  Patient pending DEC evaluation.  Signed out to Nohemi Vincent,   12/23/21 5549

## 2021-12-23 NOTE — ED TRIAGE NOTES
"Arrived by EMS from [facility]. Made SI to staff and friends. Went for walk. PD couldn't find him. \"wants to jump off a bridge\" Took hand full of pills about one week ago. On coumadin. Patient did not want to come today. Denies making suicide ideation to EMS, but made it to staff at [facility].    [facility] address [address] [place] [address] in [facility].  "
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
## 2021-12-23 NOTE — ED NOTES
Bed: ED05  Expected date: 12/22/21  Expected time: 6:39 PM  Means of arrival: Ambulance  Comments:  22 yo SI

## 2021-12-23 NOTE — DISCHARGE INSTRUCTIONS
You were seen after leaving your group home and stating you felt suicidal. You talked to several care providers, and together, with your team we made the following plan:    1) You will return home, with your staff, who you feel supported by. You state you are feeling better today. Your staff will come and pick you up.  2) You and this team talked to Jg, the Supervisor at [facility], which manages your home, to make sure your living environment is safe, and that you have support!  3) You have some people who might take you out in the next couple days to stay busy and have fun - eat cookies, go to [employer], etc.  4) You plan on continuing to advocate for yourself with your team in 2022, and use skills like exercise and talking to friends, staff, as well as making music, to manage your feelings.    If you are unsafe or in a crisis, you turn to your staff first. If 911 or Emergency Department is needed, that is ok too.    You have a team that cares about you- their contact info is:  [facility]: on-call supervisor #[phone], crisis team #[phone], primary staff Nick Sutton #[phone], and his supervisor is Jg Clifford #[phone].      Psychiatrist is Dr. Miller @ [facility] in [place] - they're aware you were in the ED and their help has been requested to see if any med changes might be a good idea     Behavioral Therapist is Monalisa Jameson at [facility].       is Maria Luz @ [facility] ([phone]).     Primary Care Provider is at [facility].

## 2021-12-28 ENCOUNTER — TELEPHONE (OUTPATIENT)
Dept: UROLOGY | Facility: CLINIC | Age: 21
End: 2021-12-28
Payer: COMMERCIAL

## 2021-12-28 NOTE — TELEPHONE ENCOUNTER
[facility] Call Center    Phone Message    May a detailed message be left on voicemail: yes     Reason for Call: Appointment Intake    Referring Provider Name: Emergency dept  Diagnosis and/or Symptoms: Foreign object in urethra  Discharged 12/22.    Action Taken: Message routed to:  [facility] ([facility]    Travel Screening: Not Applicable

## 2021-12-28 NOTE — TELEPHONE ENCOUNTER
Attempted to call Nick Sutton back.  Left detailed message to call clinic back at [phone].   To discuss pt's current status. Chart and problems list reviewed.

## 2021-12-30 NOTE — TELEPHONE ENCOUNTER
Attempted to call Nick Sutton. Left detailed message to call clinic back at [phone].   Second and last attempt to reach caller.

## 2022-01-05 ENCOUNTER — TELEPHONE (OUTPATIENT)
Dept: UROLOGY | Facility: CLINIC | Age: 22
End: 2022-01-05
Payer: COMMERCIAL

## 2022-01-05 NOTE — TELEPHONE ENCOUNTER
[facility] Call Center    Phone Message    May a detailed message be left on voicemail: yes     Reason for Call: Appointment Intake    Referring Provider Name: Emergency room   Diagnosis and/or Symptoms: Foreign object in urethra. Emergency room said asap. Nick is willing to go to [place] or [place], but states he will only see a male provider.    Action Taken: Message routed to:  Other: [facility]    Travel Screening: Not Applicable

## 2022-02-18 ENCOUNTER — OFFICE VISIT (OUTPATIENT)
Dept: UROLOGY | Facility: CLINIC | Age: 22
End: 2022-02-18
Payer: COMMERCIAL

## 2022-02-18 VITALS
DIASTOLIC BLOOD PRESSURE: 70 MMHG | SYSTOLIC BLOOD PRESSURE: 128 MMHG | BODY MASS INDEX: 28.36 KG/M2 | WEIGHT: 214 LBS | HEIGHT: 73 IN

## 2022-02-18 DIAGNOSIS — T19.0XXS: Primary | ICD-10-CM

## 2022-02-18 LAB
ALBUMIN UR-MCNC: NEGATIVE MG/DL
APPEARANCE UR: CLEAR
BILIRUB UR QL STRIP: NEGATIVE
COLOR UR AUTO: YELLOW
GLUCOSE UR STRIP-MCNC: NEGATIVE MG/DL
HGB UR QL STRIP: NEGATIVE
KETONES UR STRIP-MCNC: NEGATIVE MG/DL
LEUKOCYTE ESTERASE UR QL STRIP: NEGATIVE
NITRATE UR QL: NEGATIVE
PH UR STRIP: 7 [PH] (ref 5–7)
SP GR UR STRIP: 1.01 (ref 1–1.03)
UROBILINOGEN UR STRIP-ACNC: 0.2 E.U./DL

## 2022-02-18 PROCEDURE — 99203 OFFICE O/P NEW LOW 30 MIN: CPT | Performed by: UROLOGY

## 2022-02-18 PROCEDURE — 81003 URINALYSIS AUTO W/O SCOPE: CPT | Mod: QW | Performed by: UROLOGY

## 2022-02-18 ASSESSMENT — PAIN SCALES - GENERAL: PAINLEVEL: NO PAIN (0)

## 2022-02-18 NOTE — NURSING NOTE
Chief Complaint   Patient presents with     Foreign object     Pt here for follow up after ED visit     Bing Vidal CMA

## 2022-02-18 NOTE — PROGRESS NOTES
[facility]  Main Office: [address]  [address]  [place] [address]       CHIEF COMPLAINT:  History of placed foreign body in the urethra    HISTORY:   This is a 21-year-old gentleman who was in emergency department in December with suicide ideation.  During that visit it was discovered that he had been placing a foreign body in his urethra.  He said he had been placing the foreign body in his urethra not long before this emergency department visit.  He has not done this again since then.  He showed me a photograph and it was a ada needle that he had been placing in the urethra.  This appears to be a metallic object.  He says that the needle never broke and he never had concerns for a retained foreign body in the urethra.  He did have a plain film performed in the ER which was negative.  It was recommended by the department that he see me.  He has no urinary symptoms or complaints at this time.  He has no gross hematuria.  He did have 4 RBC/hpf on urine sample in the emergency department.      PAST MEDICAL HISTORY:   Past Medical History:   Diagnosis Date     ADHD      Anxiety      Autistic disorder      Heart murmur      Hernia, abdominal      History of thrombophlebitis      Palpitations      Prostate infection        PAST SURGICAL HISTORY:   Past Surgical History:   Procedure Laterality Date     ENT SURGERY      tonsillectomy     HERNIA REPAIR       TONSILLECTOMY & ADENOIDECTOMY         FAMILY HISTORY: History reviewed. No pertinent family history.    SOCIAL HISTORY:   Social History     Tobacco Use     Smoking status: Never Smoker     Smokeless tobacco: Never Used   Substance Use Topics     Alcohol use: No          Allergies   Allergen Reactions     Dairy Products [Milk Protein Extract]      Gluten Meal          Current Outpatient Medications:      Ascorbic Acid (VITAMIN C PO), Take 100 mg by mouth daily, Disp: , Rfl:      Fesoterodine Fumarate 8 MG TB24, Take 8 mg by mouth daily, Disp: ,  "Rfl:      gabapentin (NEURONTIN) 100 MG capsule, Take 100 mg by mouth 3 times daily, Disp: , Rfl:      lisinopril (ZESTRIL) 5 MG tablet, Take 5 mg by mouth daily, Disp: , Rfl:      meloxicam (MOBIC) 7.5 MG tablet, Take 7.5 mg by mouth daily, Disp: , Rfl:      methylphenidate (CONCERTA) 36 MG CR tablet, Take 36 mg by mouth every morning, Disp: , Rfl:      QUEtiapine (SEROQUEL) 200 MG tablet, Take 100 mg by mouth 3 times daily , Disp: , Rfl:      Sertraline HCl (ZOLOFT PO), Take 150 mg by mouth daily , Disp: , Rfl:      UNABLE TO FIND, MEDICATION NAME: \"detox phase\", Disp: , Rfl:      VITAMIN D, CHOLECALCIFEROL, PO, Take 400 Units by mouth daily , Disp: , Rfl:      warfarin ANTICOAGULANT (COUMADIN) 5 MG tablet, Take by mouth daily 2.5mg=Wednesdays 5mg=all other days of the week, Disp: , Rfl:     Review Of Systems:  Skin: No rash, pruritis, or skin pigmentation  Eyes: No changes in vision  Ears/Nose/Throat: No changes in hearing, no nosebleeds  Respiratory: No shortness of breath, dyspnea on exertion, cough, or hemoptysis  Cardiovascular: No chest pain or palpitations  Gastrointestinal: No diarrhea or constipation. No abdominal pain. No hematochezia  Genitourinary: see HPI  Musculoskeletal: No pain or swelling of joints, normal range of motion  Neurologic: No weakness or tremors  Psychiatric: No recent changes in memory or mood  Hematologic/Lymphatic/Immunologic: No easy bruising or enlarged lymph nodes  Endocrine: No weight gain or loss      PHYSICAL EXAM:    /70   Ht 1.854 m (6' 1\")   Wt 97.1 kg (214 lb)   BMI 28.23 kg/m    General appearance: In NAD, conversant  HEENT: Normocephalic and atraumatic, anicteric sclera  Cardiovascular: Not examined  Respiratory: normal, non-labored breathing  Gastrointestinal: negative, Abdomen soft, non-tender, and non-distended.   Musculoskeletal: Not Examined  Peripheral Vascular/extremity: No peripheral edema  Skin: Normal temperature, turgor, and texture. No " rash  Psychiatric: Appropriate affect, alert and oriented to person, place, and time    Penis: Not Examined  Scrotal Skin: Not Examined   Testicles: Not Examined  Epididymis: Not Examined  Lymphatic: Not examined  Digital Rectal Exam:     Cystoscopy: Not done      PSA:     UA RESULTS:  Recent Labs   Lab Test 12/22/21 1933   COLOR Yellow   APPEARANCE Clear   URINEGLC Negative   URINEBILI Negative   URINEKETONE 20 *   SG 1.024   UBLD Negative   URINEPH 6.5   PROTEIN 30 *   NITRITE Negative   LEUKEST Negative   RBCU 4*   WBCU 1       Bladder Scan:     Other Labs:      Imaging Studies: None      CLINICAL IMPRESSION:   History of foreign body placed in the urethra    PLAN:   He has never had any concerns for a retained foreign body in the urethra.  He has had no symptoms or sequelae from his previous history of foreign body placement in the urethra.  His urinalysis is normal today.  No need for further evaluation, he will follow up with me as needed in the future.      Go Coley MD

## 2022-02-18 NOTE — LETTER
2/18/2022       RE: Octavio Kirkpatrick  [id number]  [facility] [address]     Dear Colleague,    Thank you for referring your patient, Octavio Kirkpatrick, to the [facility] [place] at [facility]. Please see a copy of my visit note below.    [facility]  Main Office: [address]  [address]  [place] [address]       CHIEF COMPLAINT:  History of placed foreign body in the urethra    HISTORY:   This is a 21-year-old gentleman who was in emergency department in December with suicide ideation.  During that visit it was discovered that he had been placing a foreign body in his urethra.  He said he had been placing the foreign body in his urethra not long before this emergency department visit.  He has not done this again since then.  He showed me a photograph and it was a ada needle that he had been placing in the urethra.  This appears to be a metallic object.  He says that the needle never broke and he never had concerns for a retained foreign body in the urethra.  He did have a plain film performed in the ER which was negative.  It was recommended by the department that he see me.  He has no urinary symptoms or complaints at this time.  He has no gross hematuria.  He did have 4 RBC/hpf on urine sample in the emergency department.      PAST MEDICAL HISTORY:   Past Medical History:   Diagnosis Date     ADHD      Anxiety      Autistic disorder      Heart murmur      Hernia, abdominal      History of thrombophlebitis      Palpitations      Prostate infection        PAST SURGICAL HISTORY:   Past Surgical History:   Procedure Laterality Date     ENT SURGERY      tonsillectomy     HERNIA REPAIR       TONSILLECTOMY & ADENOIDECTOMY         FAMILY HISTORY: History reviewed. No pertinent family history.    SOCIAL HISTORY:   Social History     Tobacco Use     Smoking status: Never Smoker     Smokeless tobacco: Never Used   Substance Use  "Topics     Alcohol use: No          Allergies   Allergen Reactions     Dairy Products [Milk Protein Extract]      Gluten Meal          Current Outpatient Medications:      Ascorbic Acid (VITAMIN C PO), Take 100 mg by mouth daily, Disp: , Rfl:      Fesoterodine Fumarate 8 MG TB24, Take 8 mg by mouth daily, Disp: , Rfl:      gabapentin (NEURONTIN) 100 MG capsule, Take 100 mg by mouth 3 times daily, Disp: , Rfl:      lisinopril (ZESTRIL) 5 MG tablet, Take 5 mg by mouth daily, Disp: , Rfl:      meloxicam (MOBIC) 7.5 MG tablet, Take 7.5 mg by mouth daily, Disp: , Rfl:      methylphenidate (CONCERTA) 36 MG CR tablet, Take 36 mg by mouth every morning, Disp: , Rfl:      QUEtiapine (SEROQUEL) 200 MG tablet, Take 100 mg by mouth 3 times daily , Disp: , Rfl:      Sertraline HCl (ZOLOFT PO), Take 150 mg by mouth daily , Disp: , Rfl:      UNABLE TO FIND, MEDICATION NAME: \"detox phase\", Disp: , Rfl:      VITAMIN D, CHOLECALCIFEROL, PO, Take 400 Units by mouth daily , Disp: , Rfl:      warfarin ANTICOAGULANT (COUMADIN) 5 MG tablet, Take by mouth daily 2.5mg=Wednesdays 5mg=all other days of the week, Disp: , Rfl:     Review Of Systems:  Skin: No rash, pruritis, or skin pigmentation  Eyes: No changes in vision  Ears/Nose/Throat: No changes in hearing, no nosebleeds  Respiratory: No shortness of breath, dyspnea on exertion, cough, or hemoptysis  Cardiovascular: No chest pain or palpitations  Gastrointestinal: No diarrhea or constipation. No abdominal pain. No hematochezia  Genitourinary: see HPI  Musculoskeletal: No pain or swelling of joints, normal range of motion  Neurologic: No weakness or tremors  Psychiatric: No recent changes in memory or mood  Hematologic/Lymphatic/Immunologic: No easy bruising or enlarged lymph nodes  Endocrine: No weight gain or loss      PHYSICAL EXAM:    /70   Ht 1.854 m (6' 1\")   Wt 97.1 kg (214 lb)   BMI 28.23 kg/m    General appearance: In NAD, conversant  HEENT: Normocephalic and atraumatic, " anicteric sclera  Cardiovascular: Not examined  Respiratory: normal, non-labored breathing  Gastrointestinal: negative, Abdomen soft, non-tender, and non-distended.   Musculoskeletal: Not Examined  Peripheral Vascular/extremity: No peripheral edema  Skin: Normal temperature, turgor, and texture. No rash  Psychiatric: Appropriate affect, alert and oriented to person, place, and time    Penis: Not Examined  Scrotal Skin: Not Examined   Testicles: Not Examined  Epididymis: Not Examined  Lymphatic: Not examined  Digital Rectal Exam:     Cystoscopy: Not done      PSA:     UA RESULTS:  Recent Labs   Lab Test 12/22/21  1933   COLOR Yellow   APPEARANCE Clear   URINEGLC Negative   URINEBILI Negative   URINEKETONE 20 *   SG 1.024   UBLD Negative   URINEPH 6.5   PROTEIN 30 *   NITRITE Negative   LEUKEST Negative   RBCU 4*   WBCU 1       Bladder Scan:     Other Labs:      Imaging Studies: None      CLINICAL IMPRESSION:   History of foreign body placed in the urethra    PLAN:   He has never had any concerns for a retained foreign body in the urethra.  He has had no symptoms or sequelae from his previous history of foreign body placement in the urethra.  His urinalysis is normal today.  No need for further evaluation, he will follow up with me as needed in the future.      Go Coley MD

## 2022-02-24 ENCOUNTER — OFFICE VISIT (OUTPATIENT)
Dept: DERMATOLOGY | Facility: CLINIC | Age: 22
End: 2022-02-24
Payer: COMMERCIAL

## 2022-02-24 DIAGNOSIS — Z12.83 SKIN CANCER SCREENING: ICD-10-CM

## 2022-02-24 DIAGNOSIS — D22.9 MULTIPLE MELANOCYTIC NEVI: Primary | ICD-10-CM

## 2022-02-24 PROCEDURE — 99203 OFFICE O/P NEW LOW 30 MIN: CPT | Mod: GC | Performed by: DERMATOLOGY

## 2022-02-24 NOTE — PROGRESS NOTES
[facility] Dermatology Note      Dermatology Problem List:  1. Clinically benign nevi    Impression/Plan:  1. Multiple clinically benign nevi, including right hand, legs and genital skin.    Reassurance provided    ABCDs of melanoma were discussed and self skin checks were advised. No further intervention required. Patient to report changes.     Sun precaution was advised including the use of sun screens of SPF 30 or higher, sun protective clothing, and avoidance of tanning beds.    CC Referred MD Srinivas  No address on file on close of this encounter.  Follow-up prn.      Dr. Danny Pate staffed the patient.      Fish Cook MD  [facility] Resident    I have seen and examined this patient and agree with the assessment and plan as documented in the resident's note.    Danny Pate MD  Dermatology Attending      Encounter Date: Feb 24, 2022    CC:   Chief Complaint   Patient presents with     Skin Check     Full body skin check         History of Present Illness:  Mr. Octavio Kirkpatrick is a 21 year old male who presents in self referral for skin check.    Patient states he's just noticed 4 spots of concern; 1 on his right inner thigh, 1 on the glans, and 2 on his right hand. States he's unsure if they've advanced in size or changed in color.   Denies any known family hx or personal hx of skin cancer.  Endorses using sunscreen when outside for long periods of time.    Past Medical History:   Patient Active Problem List   Diagnosis     Elevated serum creatinine     Past Medical History:   Diagnosis Date     ADHD      Anxiety      Autistic disorder      Heart murmur      Hernia, abdominal      History of thrombophlebitis      Palpitations      Prostate infection      Past Surgical History:   Procedure Laterality Date     ENT SURGERY      tonsillectomy     HERNIA REPAIR       TONSILLECTOMY & ADENOIDECTOMY         Social History:  Patient reports that he has never smoked. He  "has never used smokeless tobacco. He reports that he does not drink alcohol.    Family History:  No family history on file.    Medications:  Current Outpatient Medications   Medication Sig Dispense Refill     Ascorbic Acid (VITAMIN C PO) Take 100 mg by mouth daily       Fesoterodine Fumarate 8 MG TB24 Take 8 mg by mouth daily       gabapentin (NEURONTIN) 100 MG capsule Take 100 mg by mouth 3 times daily       lisinopril (ZESTRIL) 5 MG tablet Take 5 mg by mouth daily       meloxicam (MOBIC) 7.5 MG tablet Take 7.5 mg by mouth daily       methylphenidate (CONCERTA) 36 MG CR tablet Take 36 mg by mouth every morning       QUEtiapine (SEROQUEL) 200 MG tablet Take 100 mg by mouth 3 times daily        Sertraline HCl (ZOLOFT PO) Take 150 mg by mouth daily        UNABLE TO FIND MEDICATION NAME: \"detox phase\"       VITAMIN D, CHOLECALCIFEROL, PO Take 400 Units by mouth daily        warfarin ANTICOAGULANT (COUMADIN) 5 MG tablet Take by mouth daily 2.5mg=Wednesdays  5mg=all other days of the week          Allergies   Allergen Reactions     Dairy Products [Milk Protein Extract]      Gluten Meal      Oatmeal Cramps, Diarrhea and GI Disturbance         Review of Systems:  -As per HPI  -Constitutional: Otherwise feeling well today, in usual state of health.  -Skin: As above in HPI. No additional skin concerns.    Physical exam:  Vitals: There were no vitals taken for this visit.  GEN: This is a well developed, well-nourished male in no acute distress, in a pleasant mood.    SKIN: Full skin excluding the groin which includes the head/face, both arms, chest, back, abdomen,both legs, buttocks, digits and/or nails, was examined.  -Farias skin type: IV  -Left lateral pointer finger with 1x0.5cm wound with healing granulation tissue without drainage or surrounding erythema  -2mm pigmented flat papule on dorsal 3rd digit.  -4mm pigmented flat papule on plantar 5th digit.  -3mm pigmented flat papule on left inner thigh.  -1mm pigmented " flat papule on penis glans.  -Multiple regular brown pigmented macules and papules are identified on the back.   -No other lesions of concern on areas examined.

## 2022-02-24 NOTE — LETTER
2/24/2022       RE: Octavio Kirkpatrick  [id number]  [place] [address]     Dear Colleague,    Thank you for referring your patient, Octavio Kirkpatrick, to the [facility] [place] at [facility]. Please see a copy of my visit note below.    [facility] Dermatology Note      Dermatology Problem List:  1. Clinically benign nevi    Impression/Plan:  1. Multiple clinically benign nevi, including right hand, legs and genital skin.    Reassurance provided    ABCDs of melanoma were discussed and self skin checks were advised. No further intervention required. Patient to report changes.     Sun precaution was advised including the use of sun screens of SPF 30 or higher, sun protective clothing, and avoidance of tanning beds.    CC Referred MD Srinivas  No address on file on close of this encounter.  Follow-up prn.      Dr. Danny Pate staffed the patient.      Fish Cook MD  [facility] Medicine Resident    I have seen and examined this patient and agree with the assessment and plan as documented in the resident's note.    Danny Pate MD  Dermatology Attending      Encounter Date: Feb 24, 2022    CC:   Chief Complaint   Patient presents with     Skin Check     Full body skin check         History of Present Illness:  Mr. Octavio Kirkpatrick is a 21 year old male who presents in self referral for skin check.    Patient states he's just noticed 4 spots of concern; 1 on his right inner thigh, 1 on the glans, and 2 on his right hand. States he's unsure if they've advanced in size or changed in color.   Denies any known family hx or personal hx of skin cancer.  Endorses using sunscreen when outside for long periods of time.    Past Medical History:   Patient Active Problem List   Diagnosis     Elevated serum creatinine     Past Medical History:   Diagnosis Date     ADHD      Anxiety      Autistic disorder       "Heart murmur      Hernia, abdominal      History of thrombophlebitis      Palpitations      Prostate infection      Past Surgical History:   Procedure Laterality Date     ENT SURGERY      tonsillectomy     HERNIA REPAIR       TONSILLECTOMY & ADENOIDECTOMY         Social History:  Patient reports that he has never smoked. He has never used smokeless tobacco. He reports that he does not drink alcohol.    Family History:  No family history on file.    Medications:  Current Outpatient Medications   Medication Sig Dispense Refill     Ascorbic Acid (VITAMIN C PO) Take 100 mg by mouth daily       Fesoterodine Fumarate 8 MG TB24 Take 8 mg by mouth daily       gabapentin (NEURONTIN) 100 MG capsule Take 100 mg by mouth 3 times daily       lisinopril (ZESTRIL) 5 MG tablet Take 5 mg by mouth daily       meloxicam (MOBIC) 7.5 MG tablet Take 7.5 mg by mouth daily       methylphenidate (CONCERTA) 36 MG CR tablet Take 36 mg by mouth every morning       QUEtiapine (SEROQUEL) 200 MG tablet Take 100 mg by mouth 3 times daily        Sertraline HCl (ZOLOFT PO) Take 150 mg by mouth daily        UNABLE TO FIND MEDICATION NAME: \"detox phase\"       VITAMIN D, CHOLECALCIFEROL, PO Take 400 Units by mouth daily        warfarin ANTICOAGULANT (COUMADIN) 5 MG tablet Take by mouth daily 2.5mg=Wednesdays  5mg=all other days of the week          Allergies   Allergen Reactions     Dairy Products [Milk Protein Extract]      Gluten Meal      Oatmeal Cramps, Diarrhea and GI Disturbance         Review of Systems:  -As per HPI  -Constitutional: Otherwise feeling well today, in usual state of health.  -Skin: As above in HPI. No additional skin concerns.    Physical exam:  Vitals: There were no vitals taken for this visit.  GEN: This is a well developed, well-nourished male in no acute distress, in a pleasant mood.    SKIN: Full skin excluding the groin which includes the head/face, both arms, chest, back, abdomen,both legs, buttocks, digits and/or nails, " was examined.  -Farias skin type: IV  -Left lateral pointer finger with 1x0.5cm wound with healing granulation tissue without drainage or surrounding erythema  -2mm pigmented flat papule on dorsal 3rd digit.  -4mm pigmented flat papule on plantar 5th digit.  -3mm pigmented flat papule on left inner thigh.  -1mm pigmented flat papule on penis glans.  -Multiple regular brown pigmented macules and papules are identified on the back.   -No other lesions of concern on areas examined.

## 2022-03-25 PROBLEM — Z12.83 SKIN CANCER SCREENING: Status: ACTIVE | Noted: 2022-03-25

## 2022-03-25 PROBLEM — D22.9 MULTIPLE MELANOCYTIC NEVI: Status: ACTIVE | Noted: 2022-03-25

## 2022-05-07 ENCOUNTER — HEALTH MAINTENANCE LETTER (OUTPATIENT)
Age: 22
End: 2022-05-07

## 2022-05-25 ENCOUNTER — HOSPITAL ENCOUNTER (EMERGENCY)
Facility: CLINIC | Age: 22
Discharge: HOME | End: 2022-05-25
Attending: EMERGENCY MEDICINE | Admitting: EMERGENCY MEDICINE
Payer: COMMERCIAL

## 2022-05-25 VITALS
RESPIRATION RATE: 20 BRPM | TEMPERATURE: 97.7 F | OXYGEN SATURATION: 97 % | DIASTOLIC BLOOD PRESSURE: 106 MMHG | SYSTOLIC BLOOD PRESSURE: 128 MMHG | HEART RATE: 108 BPM

## 2022-05-25 DIAGNOSIS — Z00.00 ENCOUNTER FOR WELLNESS EXAMINATION IN ADULT: ICD-10-CM

## 2022-05-25 PROCEDURE — 99281 EMR DPT VST MAYX REQ PHY/QHP: CPT

## 2022-05-25 PROCEDURE — 99283 EMERGENCY DEPT VISIT LOW MDM: CPT

## 2022-05-25 ASSESSMENT — ENCOUNTER SYMPTOMS
FEVER: 0
CHILLS: 0
COUGH: 0

## 2022-05-26 NOTE — ED NOTES
Bed: HW01  Expected date:   Expected time:   Means of arrival: Ambulance  Comments:  SANNA ORTEGA

## 2022-05-26 NOTE — ED PROVIDER NOTES
"  History     Chief Complaint:  Welfare Check    HPI   Octavio Kirkpatrick is a 21 year old male who presents with welfare check. The patient was gone for 4.5 hrs and was walking towards to [place] in the rain, while the group home only allows of 2 hours of \" free time\" daily. This is not the first time the patient has missed curfew. He denies cough, cold, or fever symptoms. He denies suicidal or homicidal ideation.    Review of Systems   Constitutional: Negative for chills and fever.   HENT: Negative for congestion.    Respiratory: Negative for cough.    Psychiatric/Behavioral: Negative for suicidal ideas (suicidal, homicidal).   All other systems reviewed and are negative.    Allergies:  Dairy Products [Milk Protein Extract]  Gluten Meal  Oatmeal  Lactase  Red dye    Medications:    gabapentin   lisinopril   meloxicam   methylphenidate  Quetiapine  Sertraline  warfarin    Past Medical History:    ADHD  Anxiety  Autistic disorder  Heart murmur  Abdominal hernia  Thrombophlebitis  Palpitations  Prostate infection  Multiple melanocytic nevi  PE  Fetal alcohol syndrome  MANA  Acute respiratory failure with hypoxia      Past Surgical History:    Tonsillectomy  Hernia repair  adenoidectomy    Social History:  Presents alone    Physical Exam     Patient Vitals for the past 24 hrs:   BP Temp Temp src Pulse Resp SpO2   05/25/22 2001 (!) 128/106 97.7  F (36.5  C) Oral 108 20 97 %       Physical Exam  General: Patient is alert, awake and interactive when I enter the room  Head: The scalp, face, and head appear normal  Eyes: Conjunctivae are normal  ENT: The nose is normal, Pinnae are normal, External acoustic canals are normal  Neck: Trachea midline  CV: Pulses are normal.   Resp: No respiratory distress   Musc: Normal muscular tone, moving all extremities.  Skin: No rash or lesions noted  Neuro: Speech is normal and fluent. Face is symmetric.   Psych: Normal affect.  Appropriate interactions.  Denies suicidal or homicidal " ideation.    Emergency Department Course     Emergency Department Course:    Reviewed:  I reviewed nursing notes, vitals, past medical history, Care Everywhere and [facility]    Assessments:  2102 I obtained history and examined the patient as noted above.     Disposition:  The patient was discharged to home.     Impression & Plan      Medical Decision Making:  Patient is a 21-year-old gentleman who resides in a group home who was brought to the emergency department for a welfare check.  Patient's not suicidal, homicidal or complaining of increasing depression.  He does not have any further complaints.  Spoke with the group home who will happily accept him back.      Diagnosis:    ICD-10-CM    1. Encounter for wellness examination in adult  Z00.00          Scribe Disclosure:  [place] JEFFERY Hired, am serving as a scribe at 8:22 PM on 5/25/2022 to document services personally performed by Zoltan Stephens MD based on my observations and the provider's statements to me.      Zoltan Stephens MD  05/26/22 0039

## 2022-05-26 NOTE — ED TRIAGE NOTES
"Pt to ED via Ems from group home. Group home allows pt 2 hours of \"free time\" a day and pt was gone for 4.5 hours and was found walking around in the rain towards [place]. EMS reports this is not the first time that pt has missed curfew. EMS endorsed pt was send here to \"give staff time to figure things out.\" Reportedly pt is not court ordered to be at group home. Pt has history of autism, fetal alcohol syndrome, anxiety and depression. Pt currently a/o x4, calm and cooperative. Denies any SI/HI/hallucinations. On warfarin for hx of PE.      Triage Assessment     Row Name 05/25/22 2004       Triage Assessment (Adult)    Airway WDL WDL       Respiratory WDL    Respiratory WDL WDL       Skin Circulation/Temperature WDL    Skin Circulation/Temperature WDL WDL       Cardiac WDL    Cardiac WDL WDL              "

## 2022-05-26 NOTE — ED NOTES
Pt gave writer phone number and let view text message    Spoke with Sunday from group home  [phone]    The  were called due to protocol since patient was out more than 4 hours. Per Sunday the juvenal DOLAN insisted pt be brought to the ER due to multiple calls for this person due to staying out too long. Sunday comfortable and ready to take patient home.     Dr. Stephens updated and in to see patient.

## 2022-06-28 ENCOUNTER — NURSE TRIAGE (OUTPATIENT)
Dept: NURSING | Facility: CLINIC | Age: 22
End: 2022-06-28

## 2022-06-28 ENCOUNTER — HOSPITAL ENCOUNTER (EMERGENCY)
Facility: CLINIC | Age: 22
Discharge: HOME | End: 2022-06-28
Attending: EMERGENCY MEDICINE | Admitting: EMERGENCY MEDICINE
Payer: COMMERCIAL

## 2022-06-28 VITALS
OXYGEN SATURATION: 97 % | HEIGHT: 72 IN | HEART RATE: 95 BPM | TEMPERATURE: 97.3 F | RESPIRATION RATE: 16 BRPM | BODY MASS INDEX: 28.96 KG/M2 | DIASTOLIC BLOOD PRESSURE: 92 MMHG | WEIGHT: 213.85 LBS | SYSTOLIC BLOOD PRESSURE: 142 MMHG

## 2022-06-28 DIAGNOSIS — R10.9 ABDOMINAL CRAMPING: ICD-10-CM

## 2022-06-28 PROBLEM — D64.9 NORMOCYTIC ANEMIA: Status: ACTIVE | Noted: 2020-06-20

## 2022-06-28 PROBLEM — J96.01 ACUTE RESPIRATORY FAILURE WITH HYPOXIA (H): Status: ACTIVE | Noted: 2020-06-15

## 2022-06-28 PROBLEM — N17.9 ACUTE KIDNEY INJURY (H): Status: ACTIVE | Noted: 2020-06-15

## 2022-06-28 PROBLEM — Q86.0 FETAL ALCOHOL SYNDROME: Status: ACTIVE | Noted: 2020-06-20

## 2022-06-28 PROBLEM — Z86.59 HISTORY OF PSYCHOSIS: Status: ACTIVE | Noted: 2021-06-21

## 2022-06-28 PROBLEM — I26.99 PULMONARY EMBOLISM (H): Status: ACTIVE | Noted: 2020-06-20

## 2022-06-28 PROBLEM — F41.9 ANXIETY: Status: ACTIVE | Noted: 2020-06-20

## 2022-06-28 PROBLEM — Z79.01 ANTICOAGULATION MONITORING, INR RANGE 2-3: Status: ACTIVE | Noted: 2021-01-11

## 2022-06-28 PROBLEM — F79 INTELLECTUAL DISABILITY: Status: ACTIVE | Noted: 2020-06-20

## 2022-06-28 LAB
ALBUMIN SERPL-MCNC: 4.1 G/DL (ref 3.4–5)
ALBUMIN UR-MCNC: 10 MG/DL
ALP SERPL-CCNC: 89 U/L (ref 40–150)
ALT SERPL W P-5'-P-CCNC: 26 U/L (ref 0–70)
ANION GAP SERPL CALCULATED.3IONS-SCNC: 6 MMOL/L (ref 3–14)
APPEARANCE UR: CLEAR
AST SERPL W P-5'-P-CCNC: 19 U/L (ref 0–45)
BASOPHILS # BLD AUTO: 0 10E3/UL (ref 0–0.2)
BASOPHILS NFR BLD AUTO: 1 %
BILIRUB SERPL-MCNC: 0.5 MG/DL (ref 0.2–1.3)
BILIRUB UR QL STRIP: NEGATIVE
BUN SERPL-MCNC: 10 MG/DL (ref 7–30)
CALCIUM SERPL-MCNC: 9.2 MG/DL (ref 8.5–10.1)
CHLORIDE BLD-SCNC: 106 MMOL/L (ref 94–109)
CO2 SERPL-SCNC: 27 MMOL/L (ref 20–32)
COLOR UR AUTO: ABNORMAL
CREAT SERPL-MCNC: 1.29 MG/DL (ref 0.66–1.25)
EOSINOPHIL # BLD AUTO: 0.1 10E3/UL (ref 0–0.7)
EOSINOPHIL NFR BLD AUTO: 2 %
ERYTHROCYTE [DISTWIDTH] IN BLOOD BY AUTOMATED COUNT: 12 % (ref 10–15)
GFR SERPL CREATININE-BSD FRML MDRD: 80 ML/MIN/1.73M2
GLUCOSE BLD-MCNC: 96 MG/DL (ref 70–99)
GLUCOSE UR STRIP-MCNC: NEGATIVE MG/DL
HCT VFR BLD AUTO: 48.2 % (ref 40–53)
HGB BLD-MCNC: 16.2 G/DL (ref 13.3–17.7)
HGB UR QL STRIP: NEGATIVE
IMM GRANULOCYTES # BLD: 0 10E3/UL
IMM GRANULOCYTES NFR BLD: 0 %
KETONES UR STRIP-MCNC: NEGATIVE MG/DL
LEUKOCYTE ESTERASE UR QL STRIP: NEGATIVE
LIPASE SERPL-CCNC: 88 U/L (ref 73–393)
LYMPHOCYTES # BLD AUTO: 1.7 10E3/UL (ref 0.8–5.3)
LYMPHOCYTES NFR BLD AUTO: 35 %
MCH RBC QN AUTO: 30.6 PG (ref 26.5–33)
MCHC RBC AUTO-ENTMCNC: 33.6 G/DL (ref 31.5–36.5)
MCV RBC AUTO: 91 FL (ref 78–100)
MONOCYTES # BLD AUTO: 0.4 10E3/UL (ref 0–1.3)
MONOCYTES NFR BLD AUTO: 9 %
NEUTROPHILS # BLD AUTO: 2.6 10E3/UL (ref 1.6–8.3)
NEUTROPHILS NFR BLD AUTO: 53 %
NITRATE UR QL: NEGATIVE
NRBC # BLD AUTO: 0 10E3/UL
NRBC BLD AUTO-RTO: 0 /100
PH UR STRIP: 6.5 [PH] (ref 5–7)
PLATELET # BLD AUTO: 279 10E3/UL (ref 150–450)
POTASSIUM BLD-SCNC: 3.8 MMOL/L (ref 3.4–5.3)
PROT SERPL-MCNC: 7.5 G/DL (ref 6.8–8.8)
RBC # BLD AUTO: 5.3 10E6/UL (ref 4.4–5.9)
RBC URINE: 2 /HPF
SODIUM SERPL-SCNC: 139 MMOL/L (ref 133–144)
SP GR UR STRIP: 1.02 (ref 1–1.03)
SPERM #/AREA URNS HPF: PRESENT /HPF
UROBILINOGEN UR STRIP-MCNC: NORMAL MG/DL
WBC # BLD AUTO: 4.8 10E3/UL (ref 4–11)
WBC URINE: 1 /HPF

## 2022-06-28 PROCEDURE — 36415 COLL VENOUS BLD VENIPUNCTURE: CPT | Performed by: EMERGENCY MEDICINE

## 2022-06-28 PROCEDURE — 99285 EMERGENCY DEPT VISIT HI MDM: CPT | Mod: 25

## 2022-06-28 PROCEDURE — 80053 COMPREHEN METABOLIC PANEL: CPT | Performed by: EMERGENCY MEDICINE

## 2022-06-28 PROCEDURE — 85014 HEMATOCRIT: CPT | Performed by: EMERGENCY MEDICINE

## 2022-06-28 PROCEDURE — 81003 URINALYSIS AUTO W/O SCOPE: CPT | Performed by: EMERGENCY MEDICINE

## 2022-06-28 PROCEDURE — 83690 ASSAY OF LIPASE: CPT | Performed by: EMERGENCY MEDICINE

## 2022-06-28 PROCEDURE — 82040 ASSAY OF SERUM ALBUMIN: CPT | Performed by: EMERGENCY MEDICINE

## 2022-06-28 ASSESSMENT — ENCOUNTER SYMPTOMS
NAUSEA: 0
VOMITING: 0
CONSTIPATION: 0
DIARRHEA: 0
FEVER: 0
BLOOD IN STOOL: 0
ABDOMINAL PAIN: 1

## 2022-06-28 NOTE — ED TRIAGE NOTES
Pt. presents to ED with complaints of lower abdominal pain that started about 7.5 hours ago. Pt. Reports last BM this morning that was a little loose. Pt. Reports he was at pelvic floor therapy today and the pain increased since then. Reports he is on a blood thinner for a PE 2.5 years ago. AVSS on RA.

## 2022-06-28 NOTE — ED PROVIDER NOTES
History   Chief Complaint:  Abdominal pain     HPI   Octavio Kirkpatrick is a 21 year old male with history of pelvic floor somatic dysfunction, PE anticoagulated on warfarin, autistic disorder and fetal alcohol syndrome who presents alone for evaluation of abdominal pain. The patient reports onset of lower abdominal pain around 1030 this morning. He states went to pelvic floor therapy and then napped until around 1730. The pain was still there when he woke up leading to ED visit. The pain is not worse with eating or movement. No nausea, vomiting, bloody stool, groin pain or fever. His last bowel movement was this morning. He has not taken anything for the pain. No abdominal surgery history or history of kidney stones. He reports that he felt it was maybe due to constipation but he did have a normal bowel movement this morning. He notes that the symptoms have been improving since they started. He denies any other symptoms.    Review of Systems   Constitutional: Negative for fever.   Gastrointestinal: Positive for abdominal pain. Negative for blood in stool, constipation, diarrhea, nausea and vomiting.   Genitourinary:        No groin pain   All other systems reviewed and are negative.    Allergies:  Dairy Products  Gluten Meal  Oatmeal    Medications:  Zoloft  Seroquel  Coumadin   fesoterodine   Gabapentin  Lisinopril  Concerta     Past Medical History:     Multiple melanocytic nevi    MANA  Acute respiratory failure with hypoxia   ADHD  Anxiety  Autistic disorder  Depressed   Fetal alcohol syndrome  Psychosis   PE  Tachycardia   Pelvic floor somatic dysfunction     Past Surgical History:    Tonsillectomy & adenoidectomy   Hernia repair     Social History:  The patient presents to the ED alone   PCP: Ramakrishna Rainey MD    Physical Exam     Patient Vitals for the past 24 hrs:   BP Temp Temp src Pulse Resp SpO2 Height Weight   06/28/22 2015 (!) 142/92 -- -- 95 -- 97 % -- --   06/28/22 1756 (!) 147/96 97.3  F  (36.3  C) Temporal 103 16 99 % 1.829 m (6') 97 kg (213 lb 13.5 oz)       Physical Exam  General: Well appearing, nontoxic. Resting comfortably  Head:  Scalp, face, and head appear normal  Eyes:  Pupils are equal, round    Conjunctivae non-injected and sclerae white  ENT:    The external nose is normal    Pinnae are normal  Neck:  Normal range of motion    There is no rigidity noted    Trachea is in the midline  CV:  Regular rate and rhythm     Normal S1/S2, no S3/S4    No murmur or rub. Radial pulses 2+ bilaterally.  Resp:  Lungs are clear and equal bilaterally  There is no tachypnea    No increased work of breathing    No rales, wheezing, or rhonchi  GI:  Abdomen is soft, no rigidity or guarding    No distension, or mass    Mild RLQ tenderness. No rebound tenderness. No RUQ tenderness to palpation and herrera's sign neg.  MS:  Normal muscular tone    Symmetric motor strength    No lower extremity edema  Skin:  No rash or acute skin lesions noted  Neuro:  Awake and alert  Speech is normal and fluent  Moves all extremities spontaneously  Psych: Normal affect. Appropriate interactions.     Emergency Department Course     Laboratory:  Labs Ordered and Resulted from Time of ED Arrival to Time of ED Departure   COMPREHENSIVE METABOLIC PANEL - Abnormal       Result Value    Sodium 139      Potassium 3.8      Chloride 106      Carbon Dioxide (CO2) 27      Anion Gap 6      Urea Nitrogen 10      Creatinine 1.29 (*)     Calcium 9.2      Glucose 96      Alkaline Phosphatase 89      AST 19      ALT 26      Protein Total 7.5      Albumin 4.1      Bilirubin Total 0.5      GFR Estimate 80     ROUTINE UA WITH MICROSCOPIC REFLEX TO CULTURE - Abnormal    Color Urine Light Yellow      Appearance Urine Clear      Glucose Urine Negative      Bilirubin Urine Negative      Ketones Urine Negative      Specific Gravity Urine 1.025      Blood Urine Negative      pH Urine 6.5      Protein Albumin Urine 10  (*)     Urobilinogen Urine Normal       Nitrite Urine Negative      Leukocyte Esterase Urine Negative      Sperm Urine Present (*)     RBC Urine 2      WBC Urine 1     LIPASE - Normal    Lipase 88     CBC WITH PLATELETS AND DIFFERENTIAL    WBC Count 4.8      RBC Count 5.30      Hemoglobin 16.2      Hematocrit 48.2      MCV 91      MCH 30.6      MCHC 33.6      RDW 12.0      Platelet Count 279      % Neutrophils 53      % Lymphocytes 35      % Monocytes 9      % Eosinophils 2      % Basophils 1      % Immature Granulocytes 0      NRBCs per 100 WBC 0      Absolute Neutrophils 2.6      Absolute Lymphocytes 1.7      Absolute Monocytes 0.4      Absolute Eosinophils 0.1      Absolute Basophils 0.0      Absolute Immature Granulocytes 0.0      Absolute NRBCs 0.0            Emergency Department Course:             Reviewed:  I reviewed nursing notes, vitals, past medical history and Care Everywhere    Assessments:  1830 I obtained history and examined the patient as noted above.   1948 Nursing staff updated me that patient's pain was gone and he was not wanting to do the CT scan anymore.  2002 I rechecked the patient and explained findings. Discharge planned    Interventions:  None     Disposition:  The patient was discharged to home.     Impression & Plan   Medical Decision Making:  Octavio Kirkpatrick is a 21 year old male who presented for evaluation of mild abdominal pain.  On my evaluation he is well-appearing, hemodynamically stable and afebrile.  A broad differential diagnosis was considered.  Laboratory studies were obtained and were reassuring.  CBC, CMP and lipase all within normal limits.  I had initially discussed with the patient obtaining a CT scan of the abdomen to evaluate for appendicitis or other serious causes.  During his ED stay though his symptoms completely resolved and the patient felt much improved.  He declined further testing including CT scan.  Repeat abdominal exam revealed no tenderness whatsoever.  I feel that it is unlikely that he  has an acute emergent or underlying surgical process as the cause of his symptoms.  Symptoms likely due to intestinal cramping.  Laboratory studies have been reassuring.  I discussed with the patient that a benign etiology such as cramping was the most likely cause of his symptoms however if he has any worsening, fevers or severe pain he should return to the emergency department immediately.  I recommended close follow-up with his primary care physician as needed.  Patient was agreeable with the plan of care and he was discharged in improved condition.    Diagnosis:    ICD-10-CM    1. Abdominal cramping  R10.9        Discharge Medications:  Discharge Medication List as of 6/28/2022  8:12 PM          Scribe Disclosure:  I, Fish Brannon, am serving as a scribe at 6:06 PM on 6/28/2022 to document services personally performed by Adelso Contreras MD based on my observations and the provider's statements to me.            Adelso Contreras MD  06/29/22 2596

## 2022-07-25 ENCOUNTER — MEDICAL CORRESPONDENCE (OUTPATIENT)
Dept: HEALTH INFORMATION MANAGEMENT | Facility: CLINIC | Age: 22
End: 2022-07-25

## 2022-07-25 ENCOUNTER — TRANSFERRED RECORDS (OUTPATIENT)
Dept: HEALTH INFORMATION MANAGEMENT | Facility: CLINIC | Age: 22
End: 2022-07-25

## 2022-07-26 ENCOUNTER — TRANSCRIBE ORDERS (OUTPATIENT)
Dept: OTHER | Age: 22
End: 2022-07-26

## 2022-07-26 ENCOUNTER — DOCUMENTATION ONLY (OUTPATIENT)
Dept: ONCOLOGY | Facility: CLINIC | Age: 22
End: 2022-07-26

## 2022-07-26 DIAGNOSIS — Z86.711 HISTORY OF PULMONARY EMBOLUS (PE): Primary | ICD-10-CM

## 2022-08-10 ENCOUNTER — NURSE TRIAGE (OUTPATIENT)
Dept: NURSING | Facility: CLINIC | Age: 22
End: 2022-08-10

## 2022-08-11 NOTE — TELEPHONE ENCOUNTER
Patient is calling because he urinated and it came out in 2 streams instead of one. Per protocol, patient can set up appointment within 2 weeks. He declines at this time and will monitor symptoms.    Sierra Torrez RN  [facility]  9:28 PM  8/10/2022          Reason for Disposition    All other urine symptoms    Additional Information    Negative: Shock suspected (e.g., cold/pale/clammy skin, too weak to stand, low BP, rapid pulse)    Negative: Sounds like a life-threatening emergency to the triager    Negative: Followed a female genital area injury (e.g., vagina, vulva)    Negative: Followed a male genital area injury (e.g., penis, scrotum)    Negative: Vaginal discharge    Negative: Pus (white, yellow) or bloody discharge from end of penis    Negative: [1] Taking antibiotic for urinary tract infection (UTI) AND [2] female    Negative: [1] Taking antibiotic for urinary tract infection (UTI) AND [2] male    Negative: [1] Pain or burning with passing urine (urination) AND [2] pregnant    Negative: [1] Pain or burning with passing urine (urination) AND [2] postpartum (from 0 to 6 weeks after delivery)    Negative: [1] Pain or burning with passing urine (urination) AND [2] female    Negative: [1] Pain or burning with passing urine (urination) AND [2] male    Negative: Pain or itching in the vulvar area    Negative: Pain in scrotum is main symptom    Negative: Blood in the urine is main symptom    Negative: Symptoms arising from use of a urinary catheter (e.g., coude, Paz)    Negative: [1] Unable to urinate (or only a few drops) > 4 hours AND [2] bladder feels very full (e.g., palpable bladder or strong urge to urinate)    Negative: [1] Decreased urination and [2] drinking very little AND [2] dehydration suspected (e.g., dark urine, no urine > 12 hours, very dry mouth, very lightheaded)    Negative: Patient sounds very sick or weak to the triager    Negative: Fever > 100.4 F (38.0 C)    Negative: Side  (flank) or lower back pain present    Negative: [1] Can't control passage of urine (i.e., urinary incontinence) AND [2] new-onset (< 2 weeks) or worsening    Negative: Urinating more frequently than usual (i.e., frequency)    Negative: Bad or foul-smelling urine    Negative: [1] Can't control passage of urine (i.e., urinary incontinence, wetting self) AND [2] present > 2 weeks    Negative: Urination is difficult to start (i.e., hesitancy) or straining    Negative: Dribbling (losing urine) just after finishing urination (i.e., post-void dribbling)    Negative: Has to get out of bed to urinate > 2 times a night (i.e., nocturia)    Protocols used: URINARY SYMPTOMS-A-AH

## 2022-09-06 NOTE — TELEPHONE ENCOUNTER
Medicare Wellness Visit  Plan for Preventive Care    A good way for you to stay healthy is to use preventive care.  Medicare covers many services that can help you stay healthy.* The goal of these services is to find any health problems as quickly as possible. Finding problems early can help make them easier to treat.  Your personal plan below lists the services you may need and when they are due.      Health Maintenance Summary     Hepatitis B Vaccine (1 of 3 - 3-dose series)  Overdue - never done    Shingles Vaccine (2 of 3)  Overdue since 4/20/2015    Colorectal Cancer Screen- (Colonoscopy - Every 10 Years)  Overdue since 7/31/2019    Traditional Medicare- Medicare Wellness Visit (Yearly)  Overdue since 7/14/2022    Influenza Vaccine (1)  Due since 9/1/2022    COVID-19 Vaccine (4 - Booster for Pfizer series)  Next due on 10/18/2022    Depression Screening (Yearly)  Next due on 8/17/2023    Breast Cancer Screening (Every 2 Years)  Next due on 2/23/2024    DTaP/Tdap/Td Vaccine (2 - Td or Tdap)  Next due on 2/23/2025    Hepatitis C Screening   Completed    Osteoporosis Screening   Completed    Pneumococcal Vaccine 65+   Completed    Meningococcal Vaccine   Aged Out    HPV Vaccine   Aged Out           Preventive Care for Women and Men    Heart Screenings (Cardiovascular):  · Blood tests are used to check your cholesterol, lipid and triglyceride levels. High levels can increase your risk for heart disease and stroke. High levels can be treated with medications, diet and exercise. Lowering your levels can help keep your heart and blood vessels healthy.  Your provider will order these tests if they are needed.    · An ultrasound is done to see if you have an abdominal aortic aneurysm (AAA).  This is an enlargement of one of the main blood vessels that delivers blood to the body.   In the [place], 9,000 deaths are caused by AAA.  You may not even know you have this problem and as many as 1 in 3 people will have a    Nurse Triage SBAR    Is this a 2nd Level Triage? NO    Situation: Patient calling with lower abdominal pain  Consent: not needed    Background:   Patient is calling with 7/10 pain in his lower abdomen. History of pulmonary embolism and on blood thinners for past 2 years.     Assessment:    Patient had pelvic flood therapy today and started to experience lower abdominal pain, took a nap with constant abdominal pain for 7 hours. Denies blood in urine, stool and no previous injury.       Protocol Recommended Disposition:   Go to ED Now    Recommendation: Advised patient to Go to ED now . Reviewed concerning symptoms and when to call back.       Roosevelt La RN [facility] 6/28/2022 5:27 PM      COVID 19 Nurse Triage Plan/Patient Instructions    Please be aware that novel coronavirus (COVID-19) may be circulating in the community. If you develop symptoms such as fever, cough, or SOB or if you have concerns about the presence of another infection including coronavirus (COVID-19), please contact your health care provider or visit [email or web address].     Disposition/Instructions    Thank you for taking steps to prevent the spread of this virus.  o Limit your contact with others.  o Wear a simple mask to cover your cough.  o Wash your hands well and often.    Resources    [facility]: About COVID-19: [email or web address]    CDC: What to Do If You're Sick: www.cdc.gov/coronavirus/2019-ncov/about/steps-when-sick.html    CDC: Ending Home Isolation: www.cdc.gov/coronavirus/2019-ncov/hcp/disposition-in-home-patients.html     CDC: Caring for Someone: www.cdc.gov/coronavirus/2019-ncov/if-you-are-sick/care-for-someone.html     [facility]: Interim Guidance for Hospital Discharge to Home: www.health.[email or web address].[email or web address]/diseases/coronavirus/hcp/hospdischarge.pdf    [facility] clinical trials (COVID-19 research studies): [email or web address]/[email or web address]-trials     Below are the  "COVID-19 hotlines at the [place] Department of Health ([facility]). Interpreters are available.   o For health questions: Call 183-317-7603 or 1-721.898.6582 (7 a.m. to 7 p.m.)  o For questions about schools and childcare: Call 065-662-0408 or 1-216.508.4795 (7 a.m. to 7 p.m.)       Reason for Disposition    [1] SEVERE pain (e.g., excruciating) AND [2] present > 1 hour    Additional Information    Negative: Shock suspected (e.g., cold/pale/clammy skin, too weak to stand, low BP, rapid pulse)    Negative: Difficult to awaken or acting confused (e.g., disoriented, slurred speech)    Negative: Passed out (i.e., lost consciousness, collapsed and was not responding)    Negative: Sounds like a life-threatening emergency to the triager    Negative: Chest pain    Negative: Pain is mainly in upper abdomen  (if needed ask: \"is it mainly above the belly button?\")    Negative: Followed an abdomen (stomach) injury    Protocols used: ABDOMINAL PAIN - MALE-A-AH      " serious problem if it is not treated.  Early diagnosis allows for more effective treatment and cure.  If you have a family history of AAA or are a male age 65-75 who has smoked, you are at higher risk of an AAA.  Your provider can order this test, if needed.    Colorectal Screening:  · There are many tests that are used to check for cancer of your colon and rectum. You and your provider should discuss what test is best for you and when to have it done.  Options include:  · Screening Colonoscopy: exam of the entire colon, seen through a flexible lighted tube.  · Flexible Sigmoidoscopy: exam of the last third (sigmoid portion) of the colon and rectum, seen through a flexible lighted tube.  · Cologuard DNA stool test: a sample of your stool is used to screen for cancer and unseen blood in your stool.  · Fecal Occult Blood Test: a sample of your stool is studied to find any unseen blood    Flu Shot:  · An immunization that helps to prevent influenza (the flu). You should get this every year. The best time to get the shot is in the fall.    Pneumococcal Shot:  • Vaccines are available that can help prevent pneumococcal disease, which is any type of infection caused by Streptococcus pneumoniae bacteria.   Their use can prevent some cases of pneumonia, meningitis, and sepsis. There are two types of pneumococcal vaccines:   o Conjugate vaccines (PCV-13 or Prevnar 13®) - helps protect against the 13 types of pneumococcal bacteria that are the most common causes of serious infections in children and adults.    o Polysaccharide vaccine (PPSV23 or Zbjwvckyu17®) - helps protect against 23 types of pneumococcal bacteria for patients who are recommended to get it.  These vaccines should be given at least 12 months apart.  A booster is usually not needed.     Hepatitis B Shot:  · An immunization that helps to protect people from getting Hepatitis B. Hepatitis B is a virus that spreads through contact with infected blood or body  fluids. Many people with the virus do not have symptoms.  The virus can lead to serious problems, such as liver disease. Some people are at higher risk than others. Your doctor will tell you if you need this shot.     Diabetes Screening:  · A test to measure sugar (glucose) in your blood is called a fasting blood sugar. Fasting means you cannot have food or drink for at least 8 hours before the test. This test can detect diabetes long before you may notice symptoms.    Glaucoma Screening:  · Glaucoma screening is performed by your eye doctor. The test measures the fluid pressure inside your eyes to determine if you have glaucoma.     Hepatitis C Screening:  · A blood test to see if you have the hepatitis C virus.  Hepatitis C attacks the liver and is a major cause of chronic liver disease.  Medicare will cover a single screening for all adults born between 1945 & 1965, or high risk patients (people who have injected illegal drugs or people who have had blood transfusions).  High risk patients who continue to inject illegal drugs can be screened for Hepatitis C every year.    Smoking and Tobacco-Use Cessation Counseling:  · Tobacco is the single greatest cause of disease and early death in our country today. Medication and counseling together can increase a person’s chance of quitting for good.   · Medicare covers two quitting attempts per year, with four counseling sessions per attempt (eight sessions in a 12 month period)    Preventive Screening tests for Women    Screening Mammograms and Breast Exams:  · An x-ray of your breasts to check for breast cancer before you or your doctor may be able to feel it.  If breast cancer is found early it can usually be treated with success.    Pelvic Exams and Pap Tests:  · An exam to check for cervical and vaginal cancer. A Pap test is a lab test in which cells are taken from your cervix and sent to the lab to look for signs of cervical cancer. If cancer of the cervix is found  early, chances for a cure are good. Testing can generally end at age 65, or if a woman has a hysterectomy for a benign condition. Your provider may recommend more frequent testing if certain abnormal results are found.    Bone Mass Measurements:  · A painless x-ray of your bone density to see if you are at risk for a broken bone. Bone density refers to the thickness of bones or how tightly the bone tissue is packed.    Preventive Screening tests for Men    Prostate Screening:  · Should you have a prostate cancer test (PSA)?  It is up to you to decide if you want a prostate cancer test. Talk to your clinician to find out if the test is right for you.  Things for you to consider and talk about should include:  · Benefits and harms of the test  · Your family history  · How your race/ethnicity may influence the test  · If the test may impact other medical conditions you have  · Your values on screenings and treatments    *Medicare pays for many preventive services to keep you healthy. For some of these services, you might have to pay a deductible, coinsurance, and / or copayment.  The amounts vary depending on the type of services you need and the kind of Medicare health plan you have.    For further details on screenings offered by Medicare please visit: https://www.medicare.gov/coverage/preventive-screening-services

## 2022-10-08 ENCOUNTER — NURSE TRIAGE (OUTPATIENT)
Dept: NURSING | Facility: CLINIC | Age: 22
End: 2022-10-08

## 2022-10-08 VITALS
SYSTOLIC BLOOD PRESSURE: 150 MMHG | HEART RATE: 99 BPM | TEMPERATURE: 97.5 F | OXYGEN SATURATION: 97 % | BODY MASS INDEX: 28.17 KG/M2 | RESPIRATION RATE: 20 BRPM | WEIGHT: 207.67 LBS | DIASTOLIC BLOOD PRESSURE: 91 MMHG

## 2022-10-08 PROCEDURE — 85027 COMPLETE CBC AUTOMATED: CPT | Performed by: EMERGENCY MEDICINE

## 2022-10-08 PROCEDURE — 84484 ASSAY OF TROPONIN QUANT: CPT | Performed by: EMERGENCY MEDICINE

## 2022-10-08 PROCEDURE — 80048 BASIC METABOLIC PNL TOTAL CA: CPT | Performed by: EMERGENCY MEDICINE

## 2022-10-08 PROCEDURE — 85610 PROTHROMBIN TIME: CPT | Performed by: EMERGENCY MEDICINE

## 2022-10-08 PROCEDURE — 36415 COLL VENOUS BLD VENIPUNCTURE: CPT | Performed by: EMERGENCY MEDICINE

## 2022-10-08 PROCEDURE — 85048 AUTOMATED LEUKOCYTE COUNT: CPT | Performed by: EMERGENCY MEDICINE

## 2022-10-08 PROCEDURE — 99284 EMERGENCY DEPT VISIT MOD MDM: CPT

## 2022-10-09 ENCOUNTER — HOSPITAL ENCOUNTER (EMERGENCY)
Facility: CLINIC | Age: 22
Discharge: HOME | End: 2022-10-09
Attending: EMERGENCY MEDICINE | Admitting: EMERGENCY MEDICINE
Payer: COMMERCIAL

## 2022-10-09 DIAGNOSIS — R07.9 CHEST PAIN, UNSPECIFIED TYPE: ICD-10-CM

## 2022-10-09 DIAGNOSIS — R79.1 SUBTHERAPEUTIC INTERNATIONAL NORMALIZED RATIO (INR): ICD-10-CM

## 2022-10-09 LAB
ANION GAP SERPL CALCULATED.3IONS-SCNC: 10 MMOL/L (ref 7–15)
BUN SERPL-MCNC: 13 MG/DL (ref 6–20)
CALCIUM SERPL-MCNC: 9.2 MG/DL (ref 8.6–10)
CHLORIDE SERPL-SCNC: 106 MMOL/L (ref 98–107)
CREAT SERPL-MCNC: 1.34 MG/DL (ref 0.67–1.17)
DEPRECATED HCO3 PLAS-SCNC: 24 MMOL/L (ref 22–29)
ERYTHROCYTE [DISTWIDTH] IN BLOOD BY AUTOMATED COUNT: 12.4 % (ref 10–15)
GFR SERPL CREATININE-BSD FRML MDRD: 77 ML/MIN/1.73M2
GLUCOSE SERPL-MCNC: 104 MG/DL (ref 70–99)
HCT VFR BLD AUTO: 47.9 % (ref 40–53)
HGB BLD-MCNC: 15.5 G/DL (ref 13.3–17.7)
HOLD SPECIMEN: NORMAL
INR PPP: 1.86 (ref 0.85–1.15)
MCH RBC QN AUTO: 29.5 PG (ref 26.5–33)
MCHC RBC AUTO-ENTMCNC: 32.4 G/DL (ref 31.5–36.5)
MCV RBC AUTO: 91 FL (ref 78–100)
PLATELET # BLD AUTO: 338 10E3/UL (ref 150–450)
POTASSIUM SERPL-SCNC: 3.9 MMOL/L (ref 3.4–5.3)
RBC # BLD AUTO: 5.26 10E6/UL (ref 4.4–5.9)
SODIUM SERPL-SCNC: 140 MMOL/L (ref 136–145)
TROPONIN T SERPL HS-MCNC: 6 NG/L
WBC # BLD AUTO: 6.2 10E3/UL (ref 4–11)

## 2022-10-09 PROCEDURE — 93005 ELECTROCARDIOGRAM TRACING: CPT

## 2022-10-09 ASSESSMENT — ACTIVITIES OF DAILY LIVING (ADL): ADLS_ACUITY_SCORE: 35

## 2022-10-09 ASSESSMENT — ENCOUNTER SYMPTOMS
VOMITING: 0
CHILLS: 0
COUGH: 0
NAUSEA: 0
RHINORRHEA: 0
ARTHRALGIAS: 1
FEVER: 0
SHORTNESS OF BREATH: 0
CHEST TIGHTNESS: 1

## 2022-10-09 NOTE — ED PROVIDER NOTES
History   Chief Complaint:  Chest Pain       The history is provided by the patient.      Octavio Kirkpatrick is a 21 year old male, anticoagulated on coumadin, with history of PE who presents for chest tightness. He reports central chest tightness beginning a few days ago after a court hearing on 10/6/22. The patient notes tightness has continued and attributes this to stress. He has not taken anything for analgesia.  The patient is on Coumadin and is taking this as prescribed. He also notes shoulder tightness on the left due to punching a punching bag to work out. He denies fevers, chills, cough, rhinorrhea, shortness of breath, abdominal pain, nausea, and vomiting. He denies any drug or alcohol use. The patient denies family history of MI under 50.     Review of Systems   Constitutional: Negative for chills and fever.   HENT: Negative for rhinorrhea.    Respiratory: Positive for chest tightness. Negative for cough and shortness of breath.    Cardiovascular: Positive for chest pain.   Gastrointestinal: Negative for nausea and vomiting.   Musculoskeletal: Positive for arthralgias (left shoulder).   All other systems reviewed and are negative.    Allergies:  Dairy Products   Gluten Meal  Lactase  Oatmeal  Red Dye    Medications:  Neurontin  Lisinopril  Mobic  Concerta  Seroquel  Zoloft  Coumadin    Past Medical History:     Multiple melanocytic nevi  Elevated serum creatinine  MANA  Acute respiratory failure with hypoxia  Anxiety  ADHD  Autistic disorder  Fetal alcohol syndrome  Psychosis  Intellectual disability  PE  Normocytic anemia      Past Surgical History:    Hernia repair  Tonsillectomy & adenoidectomy   Tympanostomy     Social History:  The patient presents to the ED alone  He has [facility] appointment [facility] the [facility] on 10/21  PCP: Ramakrishna Rainey     Physical Exam     Patient Vitals for the past 24 hrs:   BP Temp Temp src Pulse Resp SpO2 Weight   10/08/22 2316 (!) 150/91 97.5  F (36.4  C) Temporal 99 20 97 % 94.2 kg  (207 lb 10.8 oz)       Physical Exam  Nursing note and vitals reviewed.  Constitutional: Well nourished. Resting comfortably.   Eyes: Conjunctiva normal.  Pupils are equal, round, and reactive to light.   ENT: Nose normal. Mucous membranes pink and moist.    Neck: Normal range of motion.  CVS: Normal rate, regular rhythm.  Normal heart sounds.  No murmur.  Pulmonary: Lungs clear to auscultation bilaterally. No wheezes/rales/rhonchi.  GI: Abdomen soft. Nontender, nondistended. No rigidity or guarding.    MSK: No calf tenderness or swelling.  No reproducible bilateral shoulder tenderness, no overlying warmth/erythema, full active ROM  Neuro: Alert. Follows simple commands.  Skin: Skin is warm and dry. No rash noted.   Psychiatric: Normal affect.       Emergency Department Course   ECG taken at 0036, ECG read at 0041  ECG results from 10/09/22   EKG 12 lead     Value    Systolic Blood Pressure     Diastolic Blood Pressure     Ventricular Rate 74    Atrial Rate 74    NE Interval 162    QRS Duration 96        QTc 395    P Axis 59    R AXIS 60    T Axis 42    Interpretation ECG      Sinus rhythm  Minimal voltage criteria for LVH, may be normal variant  Nonspecific T wave abnormality  Abnormal ECG       Laboratory:  Labs Ordered and Resulted from Time of ED Arrival to Time of ED Departure   BASIC METABOLIC PANEL - Abnormal       Result Value    Sodium 140      Potassium 3.9      Chloride 106      Carbon Dioxide (CO2) 24      Anion Gap 10      Urea Nitrogen 13.0      Creatinine 1.34 (*)     Calcium 9.2      Glucose 104 (*)     GFR Estimate 77     INR - Abnormal    INR 1.86 (*)    TROPONIN T, HIGH SENSITIVITY - Normal    Troponin T, High Sensitivity 6     CBC WITH PLATELETS - Normal    WBC Count 6.2      RBC Count 5.26      Hemoglobin 15.5      Hematocrit 47.9      MCV 91      MCH 29.5      MCHC 32.4      RDW 12.4      Platelet Count 338          Emergency Department Course:     Reviewed:  I reviewed nursing notes,  vitals, past medical history and Care Everywhere    Assessments:  0156 I obtained history and examined the patient as noted above. I explained findings. The patient would like to forgo an xray. At this point I feel that the patient is safe for discharge, and the patient agrees.    Disposition:  The patient was discharged to home.     Impression & Plan   HEART Score:    Predicts Major Adverse Cardiac Events within 6 weeks:    History:   Highly suspicious:  2   Moderately suspicious: 1   Slightly/Non-suspicious: 0  ECG:   Significant ST depression 2   Nonspecific repolarization 1   Normal    0  Age:    >=65    2   >45-<65   1   <= 45    0  Risk Factors [DM, Current or recent smoker, HTN, HLP, FMH CAD, Obesity]   >=3 or Hx. CAD  2   1-2    1   None    0  Troponin:   >= 3x normal   2   >1 to <3x normal  1   Normal    0    This Patient's Score:   1    Interpretation:    0-3:    2.5% risk of MACE (may consider D/C)   4-6:    20.3% (Observation)   7-10:    72.7% (Admit, consider early invasive strategy)        Medical Decision Making:  Patient is a 21-year-old male presenting with chest tightness that has been ongoing for the past few days.  He reports increasing stressors at home.  He is nontoxic, in no significant distress on arrival.  EKG without STEMI.  High-sensitivity screening troponin negative.  His presentation would be atypical of ACS given symptoms greater than 6 hours.  He has a low heart score.  His INR is mildly subtherapeutic.  We did discuss obtaining CT chest to exclude PE though he is wishing to defer at this point in time.  He is also wishing to defer chest x-ray and expressed understanding of missed or delayed diagnoses.  He has no abdominal tenderness and I doubt intra-abdominal source to explain his pain.  No profound anemia or other significant electrolyte derangements.  Patient declined wanting analgesia during his time in the ED.  At this point in time, recommendation for close PCP follow-up.  I  did recommend that he double his dose of Coumadin tomorrow with plans for INR recheck in 2 days.  Stronger suspicion some component of anxiety may be at play today.  Return precautions given.    Diagnosis:    ICD-10-CM    1. Chest pain, unspecified type  R07.9       2. Subtherapeutic international normalized ratio (INR)  R79.1           Discharge Medications:  Discharge Medication List as of 10/9/2022  4:37 AM          Scribe Disclosure:  I, Hannah Griffith, am serving as a scribe at 1:22 AM on 10/9/2022 to document services personally performed by Nohemi Ornelas DO based on my observations and the provider's statements to me.            Nohemi Ornelas DO  10/09/22 0603

## 2022-10-09 NOTE — ED TRIAGE NOTES
Pt arrives to the ED due to having tightness in left side of chest that has been coming and going that moves into his left shoulder/back. Pt states that he has been pushing a punching bag at [employer] for a workout. States increased stress and feels that is contributing to his symptoms. H/o of PE, is on warfarin. No SOB.

## 2022-10-09 NOTE — TELEPHONE ENCOUNTER
"Patient reporting he had a PE 2.5 yrs ago.    Chest tightness when he breathes in but not constant plus left shoulder, but ache in shoulder is worse when he moves it.    Denies dyspnea, dizziness.  Patient also communicating with group home staff who are speaking with someone.    Disposition is to go to ER.  Patient said the group home was calling paramedics.  Will call back if any other concerns.    Kristy Ng RN  [facility]        Reason for Disposition    History of prior \"blood clot\" in leg or lungs (i.e., deep vein thrombosis, pulmonary embolism)    Additional Information    Negative: SEVERE difficulty breathing (e.g., struggling for each breath, speaks in single words)    Negative: Difficult to awaken or acting confused (e.g., disoriented, slurred speech)    Negative: Shock suspected (e.g., cold/pale/clammy skin, too weak to stand, low BP, rapid pulse)    Negative: Passed out (i.e., lost consciousness, collapsed and was not responding)    Negative: [1] Chest pain lasts > 5 minutes AND [2] age > 44    Negative: [1] Chest pain lasts > 5 minutes AND [2] age > 30 AND [3] one or more cardiac risk factors (e.g., diabetes, high blood pressure, high cholesterol, smoker, or strong family history of heart disease)    Negative: [1] Chest pain lasts > 5 minutes AND [2] history of heart disease (i.e., angina, heart attack, heart failure, bypass surgery, takes nitroglycerin)    Negative: [1] Chest pain lasts > 5 minutes AND [2] described as crushing, pressure-like, or heavy    Negative: Heart beating < 50 beats per minute OR > 140 beats per minute    Negative: Visible sweat on face or sweat dripping down face    Negative: Sounds like a life-threatening emergency to the triager    Negative: Followed a chest injury    Negative: SEVERE chest pain    Negative: [1] Chest pain (or \"angina\") comes and goes AND [2] is happening more often (increasing in frequency) or getting worse (increasing in severity) " (Exception: chest pains that last only a few seconds)    Negative: Pain also in shoulder(s) or arm(s) or jaw (Exception: pain is clearly made worse by movement)    Negative: Difficulty breathing    Negative: Dizziness or lightheadedness    Negative: Coughing up blood    Negative: Cocaine use within last 3 days    Negative: Major surgery in past month    Negative: Hip or leg fracture (broken bone) in past month (or had cast on leg or ankle in past month)    Negative: Illness requiring prolonged bedrest in past month (e.g., immobilization, long hospital stay)    Negative: Long-distance travel in past month (e.g., car, bus, train, plane; with trip lasting 6 or more hours)    Protocols used: CHEST PAIN-A-AH

## 2022-10-10 LAB
ATRIAL RATE - MUSE: 74 BPM
DIASTOLIC BLOOD PRESSURE - MUSE: NORMAL MMHG
INTERPRETATION ECG - MUSE: NORMAL
P AXIS - MUSE: 59 DEGREES
PR INTERVAL - MUSE: 162 MS
QRS DURATION - MUSE: 96 MS
QT - MUSE: 356 MS
QTC - MUSE: 395 MS
R AXIS - MUSE: 60 DEGREES
SYSTOLIC BLOOD PRESSURE - MUSE: NORMAL MMHG
T AXIS - MUSE: 42 DEGREES
VENTRICULAR RATE- MUSE: 74 BPM

## 2022-10-19 ENCOUNTER — DOCUMENTATION ONLY (OUTPATIENT)
Dept: OTHER | Facility: CLINIC | Age: 22
End: 2022-10-19

## 2022-10-20 ENCOUNTER — TELEPHONE (OUTPATIENT)
Dept: HEMATOLOGY | Facility: CLINIC | Age: 22
End: 2022-10-20

## 2022-10-20 NOTE — TELEPHONE ENCOUNTER
"Called Nick's mom and legal guardian, Kadie, regarding scheduling issues. She stated group home mangages his appointments. She was also curious as to why the previous appointment with Dr. Engle was cancelled. Unfortunately, this was cancelled outside of our clinic so we are unaware of the reason, other than \"error\". Kadie requested that I reach out to Nick's , [phone] (Nick) to confirm appointment. She also stated Nick may have a friend bring him to the appointment if the issue is transportation. She knows the appointment is important and would like a return call if  tries to cancel again.  "

## 2022-10-27 ENCOUNTER — TELEPHONE (OUTPATIENT)
Dept: HEMATOLOGY | Facility: CLINIC | Age: 22
End: 2022-10-27

## 2022-11-02 ENCOUNTER — TELEPHONE (OUTPATIENT)
Dept: HEMATOLOGY | Facility: CLINIC | Age: 22
End: 2022-11-02

## 2022-11-02 NOTE — TELEPHONE ENCOUNTER
Spoke with Kadie, Nick's legal guardian. Got updated contact information for Nick's  to confirm appointment. Faxed general consent for Kadie to sign and fax back.

## 2022-11-09 ENCOUNTER — OFFICE VISIT (OUTPATIENT)
Dept: HEMATOLOGY | Facility: CLINIC | Age: 22
End: 2022-11-09
Attending: INTERNAL MEDICINE
Payer: COMMERCIAL

## 2022-11-09 VITALS
BODY MASS INDEX: 27.53 KG/M2 | OXYGEN SATURATION: 97 % | WEIGHT: 203 LBS | SYSTOLIC BLOOD PRESSURE: 134 MMHG | TEMPERATURE: 98.1 F | HEART RATE: 110 BPM | DIASTOLIC BLOOD PRESSURE: 87 MMHG

## 2022-11-09 DIAGNOSIS — I26.02 ACUTE SADDLE PULMONARY EMBOLISM WITH ACUTE COR PULMONALE (H): Primary | ICD-10-CM

## 2022-11-09 LAB — INR PPP: 1.68 (ref 0.85–1.15)

## 2022-11-09 PROCEDURE — 85610 PROTHROMBIN TIME: CPT | Performed by: INTERNAL MEDICINE

## 2022-11-09 PROCEDURE — 86146 BETA-2 GLYCOPROTEIN ANTIBODY: CPT | Performed by: INTERNAL MEDICINE

## 2022-11-09 PROCEDURE — 99205 OFFICE O/P NEW HI 60 MIN: CPT | Performed by: INTERNAL MEDICINE

## 2022-11-09 PROCEDURE — 86147 CARDIOLIPIN ANTIBODY EA IG: CPT | Performed by: INTERNAL MEDICINE

## 2022-11-09 PROCEDURE — 85613 RUSSELL VIPER VENOM DILUTED: CPT | Performed by: INTERNAL MEDICINE

## 2022-11-09 PROCEDURE — 36415 COLL VENOUS BLD VENIPUNCTURE: CPT | Performed by: INTERNAL MEDICINE

## 2022-11-09 PROCEDURE — 85730 THROMBOPLASTIN TIME PARTIAL: CPT | Performed by: INTERNAL MEDICINE

## 2022-11-09 PROCEDURE — 85390 FIBRINOLYSINS SCREEN I&R: CPT | Mod: 26 | Performed by: PATHOLOGY

## 2022-11-09 RX ORDER — QUETIAPINE FUMARATE 100 MG/1
TABLET, FILM COATED ORAL
COMMUNITY
Start: 2022-10-18 | End: 2023-01-26

## 2022-11-09 NOTE — PROGRESS NOTES
[facility]  [address] [place] [id number]  Phone: [phone], Fax: [phone]    Outpatient Visit Note:    Patient: Octavio Kirkpatrick  MRN: [id number]  : 2000  FRANCHESKA: 2022     Reason for Consultation:  Octavio Kirkpatrick is self-referred for evaluation and treatment of pulmonary embolism.    Assessment: Octavio Kirkpatrick is a 22 year old man with an unprovoked pulmonary embolism and DVT, who has completed 6 months of anticoagulation with warfarin for treatment.  He has continued on warfarin for secondary prophylaxis, but finds the medication troublesome.  I think he would be a reasonable candidate for switching to a DOAC medication, particularly since his repeated antiphospholipid antibody testing has been negative.    I counseled him about the diagnosis of APS and why he does not meet criteria for this.  He did have a positive lupus inhibitor at the time of his acute PE, which may be related to acute inflammation.  Repeated testing later in the 2020 and in 2021 has been negative, and therefore he does not meet the diagnostic criteria for this disease.  Therefore, warfarin is not required, and I think he would make a good candidate for alternative anticoagulation.  Therefore, we discussed risks and benefits of switching to apixaban and rivaroxaban.  I discussed the mechanism of action of these drugs, risks and benefits of each including dosing timing and requirement to take with food for rivaroxaban.  After weighing the risks and benefits of each of these, he decided he like to switch to apixaban for indefinite secondary prophylaxis.    Recommendations:  1. I counseled the patient about natural history, treatment and long-term management of VTE as above  2. I think his risk is in the neighborhood of 40% for recurrence without anticoagulation in the next 10 to 15 years.  Given this, he agreed he would like to remain on secondary prophylaxis  with anticoagulation.  3. Stop warfarin  4. APS testing today to have confirmation in our system that he does not indeed have this disease.  5. Switch to apixaban 5 mg twice a day indefinitely for secondary prophylaxis  6. Return to clinic annually to renew his prescription discuss any plans for upcoming surgeries or other requirements for interruption of anticoagulation.      75 minutes spent on the date of the encounter doing chart review, review of outside records, review of test results, interpretation of tests, patient visit and documentation     Jeremiah Engle MD   of Medicine, Division of Hematology, Oncology and Transplantation  [employer] [facility] [employer]     -------------------------------  History: Octavio Kirkpatrick is a 22 year old man with a history of autism, fetal alcohol syndrome, ADHD and intellectual disability, who was eventually in his usual rather well state of health when he presented in June, 2020 with fever, shortness of breath, nausea and diarrhea and found to be profoundly hypoxemic.  He was transferred from his local hospital to [facility] for further care.  He was treated in ICU where he was intubated and mechanically ventilated for about a week.  There was initial great concern for COVID-19 pneumonia, but eventually was diagnosed with large bilateral pulmonary emboli.  He was treated with thrombolytic therapy which dramatically improved his oxygen requirements.    Given that he was young and previously healthy, thrombophilia evaluation was performed.  He was notably positive for lupus inhibitor but no other thrombophilia testing was positive.  He started on warfarin for treatment of his pulmonary embolism.  He then had follow-up later that fall.  September 2020, testing was repeated at the [facility] system and was negative for a lupus inhibitor.  In October 2021, he had anticardiolipin antibodies and beta-2 glycoprotein 1 antibody testing performed  and these were all negative.    Today he reports he is feeling well.  He has had no major problems with warfarin though does find it quite cumbersome.  He had no bleeding on the medicine.  Cost has not been a concern for this medication.  He is potentially interested in switching to an easier medicine if possible.  He does not smoke.  He is accompanied by his adoptive grandfather.    Physical Exam:  Vitals: B/P: 134/87, T: 98.1, P: 110, R: Data Unavailable, Wt: 203 lbs 0 oz Body mass index is 27.53 kg/m .   Exam:   Gen: Appears well, no distress  Ext: no edema  Skin: no ecchymoses or hematomas  Neuro: no focal deficits, affect and cognition are normal    Labs:  Pending from today    Imaging:  I reviewed his CT scan and ultrasound results from Krist[facility]

## 2022-11-09 NOTE — PATIENT INSTRUCTIONS
It was a pleasure meeting you today here at the [facility] at the [facility].  We discussed treatment and long-term management of pulmonary embolism (blood clot in the lung).  You have appropriately been treated with 6 months of warfarin to treat your blood clot back in June 2020.  Since then the purpose of warfarin has been to prevent future blood clots.    I am in agreement that you are at high enough risk for a second blood clot that it would make sense to stay on a blood thinner of some sort long-term.  I estimated that your chance of a second blood clot in the next 10 to 15 years if you stop your blood thinner, would be about 40%.    You were originally started on warfarin due to a blood test called lupus anticoagulant.  This was positive when you are in the hospital but is since turned negative as commonly occurs.  We will confirm this today with repeat testing.  Given that your testing is now negative, I feel very comfortable switching you to an alternative blood thinner.  We discussed both Xarelto (rivaroxaban) and Eliquis (apixaban) today and you opted to start Eliquis.  You can start the medicine today as long as your INR level is less than 3.  You will need to take this medicine twice a day  by about 12 hours.  You do not have to take it with food.  If you have an injury or an activity where her bleeding risk is high, you can stop your blood thinner for 1 or 2 doses without calling us.  Please give us a ring here at the clinic if you need to hold it for longer than this.  Please call us for instructions on holding and restarting the medicine if you need surgery.  I will plan to see you back in 1 years time but please on hesitate to call us with any questions or concerns.

## 2022-11-10 LAB
DRVVT CONFIRM NORMALIZED RATIO: 1.15
DRVVT SCREEN MIX RATIO: 1.04
DRVVT SCREEN RATIO: 1.28
LA PPP-IMP: NEGATIVE
LUPUS INTERPRETATION: ABNORMAL
PTT RATIO: 1.14
THROMBIN TIME: 19.5 SECONDS (ref 13–19)

## 2022-11-11 LAB
B2 GLYCOPROT1 IGG SERPL IA-ACNC: 1 U/ML
B2 GLYCOPROT1 IGM SERPL IA-ACNC: <2.4 U/ML
CARDIOLIPIN IGG SER IA-ACNC: <2 GPL-U/ML
CARDIOLIPIN IGG SER IA-ACNC: NEGATIVE
CARDIOLIPIN IGM SER IA-ACNC: 2.7 MPL-U/ML
CARDIOLIPIN IGM SER IA-ACNC: NEGATIVE

## 2023-01-07 ENCOUNTER — HEALTH MAINTENANCE LETTER (OUTPATIENT)
Age: 23
End: 2023-01-07

## 2023-01-26 ENCOUNTER — OFFICE VISIT (OUTPATIENT)
Dept: FAMILY MEDICINE | Facility: CLINIC | Age: 23
End: 2023-01-26
Payer: COMMERCIAL

## 2023-01-26 VITALS
HEART RATE: 135 BPM | RESPIRATION RATE: 16 BRPM | SYSTOLIC BLOOD PRESSURE: 116 MMHG | TEMPERATURE: 97.8 F | BODY MASS INDEX: 27.86 KG/M2 | HEIGHT: 71 IN | DIASTOLIC BLOOD PRESSURE: 70 MMHG | WEIGHT: 199 LBS | OXYGEN SATURATION: 98 %

## 2023-01-26 DIAGNOSIS — Z00.00 ROUTINE GENERAL MEDICAL EXAMINATION AT A HEALTH CARE FACILITY: Primary | ICD-10-CM

## 2023-01-26 DIAGNOSIS — I10 ESSENTIAL HYPERTENSION: ICD-10-CM

## 2023-01-26 DIAGNOSIS — Z13.220 SCREENING FOR HYPERLIPIDEMIA: ICD-10-CM

## 2023-01-26 DIAGNOSIS — Z11.4 SCREENING FOR HIV (HUMAN IMMUNODEFICIENCY VIRUS): ICD-10-CM

## 2023-01-26 DIAGNOSIS — Z13.1 SCREENING FOR DIABETES MELLITUS: ICD-10-CM

## 2023-01-26 DIAGNOSIS — Z11.59 NEED FOR HEPATITIS C SCREENING TEST: ICD-10-CM

## 2023-01-26 LAB
ALBUMIN UR-MCNC: NEGATIVE MG/DL
APPEARANCE UR: CLEAR
BILIRUB UR QL STRIP: NEGATIVE
COLOR UR AUTO: YELLOW
ERYTHROCYTE [DISTWIDTH] IN BLOOD BY AUTOMATED COUNT: 12 % (ref 10–15)
GLUCOSE UR STRIP-MCNC: NEGATIVE MG/DL
HBA1C MFR BLD: 5.6 % (ref 0–5.6)
HCT VFR BLD AUTO: 44.3 % (ref 40–53)
HGB BLD-MCNC: 14.9 G/DL (ref 13.3–17.7)
HGB UR QL STRIP: NEGATIVE
KETONES UR STRIP-MCNC: NEGATIVE MG/DL
LEUKOCYTE ESTERASE UR QL STRIP: NEGATIVE
MCH RBC QN AUTO: 30.2 PG (ref 26.5–33)
MCHC RBC AUTO-ENTMCNC: 33.6 G/DL (ref 31.5–36.5)
MCV RBC AUTO: 90 FL (ref 78–100)
NITRATE UR QL: NEGATIVE
PH UR STRIP: 6.5 [PH] (ref 5–7)
PLATELET # BLD AUTO: 286 10E3/UL (ref 150–450)
RBC # BLD AUTO: 4.94 10E6/UL (ref 4.4–5.9)
SP GR UR STRIP: >=1.03 (ref 1–1.03)
UROBILINOGEN UR STRIP-ACNC: 0.2 E.U./DL
WBC # BLD AUTO: 4 10E3/UL (ref 4–11)

## 2023-01-26 PROCEDURE — G0103 PSA SCREENING: HCPCS | Performed by: FAMILY MEDICINE

## 2023-01-26 PROCEDURE — 82570 ASSAY OF URINE CREATININE: CPT | Performed by: FAMILY MEDICINE

## 2023-01-26 PROCEDURE — 87389 HIV-1 AG W/HIV-1&-2 AB AG IA: CPT | Performed by: FAMILY MEDICINE

## 2023-01-26 PROCEDURE — 86803 HEPATITIS C AB TEST: CPT | Performed by: FAMILY MEDICINE

## 2023-01-26 PROCEDURE — 85027 COMPLETE CBC AUTOMATED: CPT | Performed by: FAMILY MEDICINE

## 2023-01-26 PROCEDURE — 36415 COLL VENOUS BLD VENIPUNCTURE: CPT | Performed by: FAMILY MEDICINE

## 2023-01-26 PROCEDURE — 82043 UR ALBUMIN QUANTITATIVE: CPT | Performed by: FAMILY MEDICINE

## 2023-01-26 PROCEDURE — 99214 OFFICE O/P EST MOD 30 MIN: CPT | Mod: 25 | Performed by: FAMILY MEDICINE

## 2023-01-26 PROCEDURE — 80061 LIPID PANEL: CPT | Performed by: FAMILY MEDICINE

## 2023-01-26 PROCEDURE — 81003 URINALYSIS AUTO W/O SCOPE: CPT | Performed by: FAMILY MEDICINE

## 2023-01-26 PROCEDURE — 83036 HEMOGLOBIN GLYCOSYLATED A1C: CPT | Performed by: FAMILY MEDICINE

## 2023-01-26 PROCEDURE — 80053 COMPREHEN METABOLIC PANEL: CPT | Performed by: FAMILY MEDICINE

## 2023-01-26 PROCEDURE — 99395 PREV VISIT EST AGE 18-39: CPT | Performed by: FAMILY MEDICINE

## 2023-01-26 RX ORDER — TOLTERODINE 4 MG/1
4 CAPSULE, EXTENDED RELEASE ORAL DAILY
COMMUNITY
Start: 2023-01-23

## 2023-01-26 RX ORDER — PRENATAL VIT 91/IRON/FOLIC/DHA 28-975-200
COMBINATION PACKAGE (EA) ORAL
COMMUNITY
Start: 2021-08-04 | End: 2024-05-22

## 2023-01-26 RX ORDER — LISINOPRIL 5 MG/1
5 TABLET ORAL DAILY
Qty: 90 TABLET | Refills: 3 | Status: SHIPPED | OUTPATIENT
Start: 2023-01-26 | End: 2024-01-31

## 2023-01-26 RX ORDER — FLUTICASONE PROPIONATE 50 MCG
2 SPRAY, SUSPENSION (ML) NASAL DAILY PRN
COMMUNITY
Start: 2022-07-28 | End: 2024-05-22

## 2023-01-26 RX ORDER — OXYBUTYNIN CHLORIDE 10 MG/1
10 TABLET, EXTENDED RELEASE ORAL
COMMUNITY
Start: 2021-06-25 | End: 2024-05-22

## 2023-01-26 RX ORDER — QUETIAPINE FUMARATE 100 MG/1
TABLET, FILM COATED ORAL
COMMUNITY
Start: 2023-01-26 | End: 2024-05-22

## 2023-01-26 RX ORDER — HYDROXYZINE HYDROCHLORIDE 25 MG/1
TABLET, FILM COATED ORAL
COMMUNITY
Start: 2021-06-16 | End: 2024-05-22

## 2023-01-26 NOTE — PROGRESS NOTES
SUBJECTIVE:   CC: Nick is an 22 year old who presents for preventative health visit.    Patient has been advised of split billing requirements and indicates understanding: Yes       Healthy Habits:     Getting at least 3 servings of Calcium per day:  Yes    Bi-annual eye exam:  Yes    Dental care twice a year:  Yes    Sleep apnea or symptoms of sleep apnea:  None    Diet:  Regular (no restrictions)    Taking medications regularly:  Yes    Medication side effects:  None    PHQ-2 Total Score: 0    Additional concerns today:  No  History of Present Illness       Reason for visit:  Physical exam    He eats 4 or more servings of fruits and vegetables daily.He consumes 0 sweetened beverage(s) daily.He exercises with enough effort to increase his heart rate 30 to 60 minutes per day.  He exercises with enough effort to increase his heart rate 4 days per week.       Social History     Tobacco Use     Smoking status: Never     Smokeless tobacco: Never   Vaping Use     Vaping Use: Never used   Substance Use Topics     Alcohol use: Yes     Comment: very rare     Drug use: Not Currently     No flowsheet data found.  No flowsheet data found.  No flowsheet data found.  No flowsheet data found.    Suicide Assessment Five-step Evaluation and Treatment (SAFE-T)        Social History     Tobacco Use     Smoking status: Never     Smokeless tobacco: Never   Substance Use Topics     Alcohol use: Yes     Comment: very rare     If you drink alcohol do you typically have >3 drinks per day or >7 drinks per week? No    Alcohol Use 1/26/2023   Prescreen: >3 drinks/day or >7 drinks/week? No       Last PSA:   Prostate Specific Antigen Screen   Date Value Ref Range Status   01/26/2023 0.47 ng/mL Final     Comment:     No reference ranges have been established for patients under 40 years.       Reviewed orders with patient. Reviewed health maintenance and updated orders accordingly - Yes  Lab work is in process    Reviewed and updated as needed  "this visit by clinical staff   Tobacco  Allergies  Meds      Soc Hx      Reviewed and updated as needed this visit by Provider   Tobacco        Soc Hx           Review of Systems  CONSTITUTIONAL: NEGATIVE for fever, chills, change in weight  INTEGUMENTARY/SKIN: NEGATIVE for worrisome rashes, moles or lesions  EYES: NEGATIVE for vision changes or irritation  ENT: NEGATIVE for ear, mouth and throat problems  RESP: NEGATIVE for significant cough or SOB  CV: NEGATIVE for chest pain, palpitations or peripheral edema  GI: NEGATIVE for nausea, abdominal pain, heartburn, or change in bowel habits   male: negative for dysuria, hematuria, decreased urinary stream, erectile dysfunction, urethral discharge  MUSCULOSKELETAL: NEGATIVE for significant arthralgias or myalgia  NEURO: NEGATIVE for weakness, dizziness or paresthesias  PSYCHIATRIC: NEGATIVE for changes in mood or affect    OBJECTIVE:   /70   Pulse (!) 135   Temp 97.8  F (36.6  C) (Tympanic)   Resp 16   Ht 1.81 m (5' 11.25\")   Wt 90.3 kg (199 lb)   SpO2 98%   BMI 27.56 kg/m      Physical Exam  GENERAL: healthy, alert and no distress  EYES: Eyes grossly normal to inspection  NECK: no adenopathy and no asymmetry, masses, or scars  RESP: lungs clear to auscultation - no rales, rhonchi or wheezes  CV: regular rates and rhythm, normal S1 S2, no S3 or S4 and no murmur, click or rub  MS: no gross musculoskeletal defects noted, no edema  SKIN: no suspicious lesions or rashes  PSYCH: mentation appears normal, affect normal/bright        ASSESSMENT/PLAN:   1. Routine general medical examination at a health care facility  Health maintenance reviewed and updated. Emphasized importance of balanced diet and regular exercise.  - PSA, screen; Future  - UA Macro with Reflex to Micro and Culture - lab collect; Future  - UA Macro with Reflex to Micro and Culture - lab collect  - PSA, screen    2. Essential hypertension  Well controlled. Recheck labs. Continue lisinopril " 5 mg daily,  - lisinopril (ZESTRIL) 5 MG tablet; Take 1 tablet (5 mg) by mouth daily  Dispense: 90 tablet; Refill: 3  - Comprehensive metabolic panel (BMP + Alb, Alk Phos, ALT, AST, Total. Bili, TP); Future  - CBC with platelets; Future  - Albumin Random Urine Quantitative with Creat Ratio; Future  - Albumin Random Urine Quantitative with Creat Ratio  - Comprehensive metabolic panel (BMP + Alb, Alk Phos, ALT, AST, Total. Bili, TP)  - CBC with platelets    3. Screening for diabetes mellitus  - Hemoglobin A1c; Future  - Comprehensive metabolic panel (BMP + Alb, Alk Phos, ALT, AST, Total. Bili, TP); Future  - Hemoglobin A1c  - Comprehensive metabolic panel (BMP + Alb, Alk Phos, ALT, AST, Total. Bili, TP)    4. Screening for hyperlipidemia  - Lipid panel reflex to direct LDL Non-fasting; Future  - Lipid panel reflex to direct LDL Non-fasting    5. Screening for HIV (human immunodeficiency virus)  Discussed CDC recommendion that everyone between the ages of 13 and 64 get tested for HIV at least once as part of routine health care. About 1 in 7 people in the [place] who have HIV don't know they have it. People at higher risk should get tested more often -- at least once per year.  - HIV Antigen Antibody Combo; Future  - HIV Antigen Antibody Combo    6. Need for hepatitis C screening test  Discussed USPSTF recommendation for one time hepatitis C screening for all adults aged 18 to 79 years.  Discussed risk factors for hepatitis C including blood transfusion and IV drug use.  - Hepatitis C Screen Reflex to HCV RNA Quant and Genotype; Future  - Hepatitis C Screen Reflex to HCV RNA Quant and Genotype      Patient has been advised of split billing requirements and indicates understanding: Yes      COUNSELING:   Reviewed preventive health counseling, as reflected in patient instructions      BMI:   Estimated body mass index is 27.56 kg/m  as calculated from the following:    Height as of this encounter: 1.81 m (5'  "11.25\").    Weight as of this encounter: 90.3 kg (199 lb).   Weight management plan: Discussed healthy diet and exercise guidelines      He reports that he has never smoked. He has never used smokeless tobacco.      Jarvis Cooper [facility] [place]  "

## 2023-01-27 LAB
ALBUMIN SERPL BCG-MCNC: 4.2 G/DL (ref 3.5–5.2)
ALP SERPL-CCNC: 89 U/L (ref 40–129)
ALT SERPL W P-5'-P-CCNC: 14 U/L (ref 10–50)
ANION GAP SERPL CALCULATED.3IONS-SCNC: 14 MMOL/L (ref 7–15)
AST SERPL W P-5'-P-CCNC: 21 U/L (ref 10–50)
BILIRUB SERPL-MCNC: <0.2 MG/DL
BUN SERPL-MCNC: 7.5 MG/DL (ref 6–20)
CALCIUM SERPL-MCNC: 9.1 MG/DL (ref 8.6–10)
CHLORIDE SERPL-SCNC: 106 MMOL/L (ref 98–107)
CHOLEST SERPL-MCNC: 141 MG/DL
CREAT SERPL-MCNC: 1.38 MG/DL (ref 0.67–1.17)
CREAT UR-MCNC: 361 MG/DL
DEPRECATED HCO3 PLAS-SCNC: 22 MMOL/L (ref 22–29)
GFR SERPL CREATININE-BSD FRML MDRD: 74 ML/MIN/1.73M2
GLUCOSE SERPL-MCNC: 100 MG/DL (ref 70–99)
HCV AB SERPL QL IA: NONREACTIVE
HDLC SERPL-MCNC: 35 MG/DL
HIV 1+2 AB+HIV1 P24 AG SERPL QL IA: NONREACTIVE
LDLC SERPL CALC-MCNC: 88 MG/DL
MICROALBUMIN UR-MCNC: <12 MG/L
MICROALBUMIN/CREAT UR: NORMAL MG/G{CREAT}
NONHDLC SERPL-MCNC: 106 MG/DL
POTASSIUM SERPL-SCNC: 3.9 MMOL/L (ref 3.4–5.3)
PROT SERPL-MCNC: 6.7 G/DL (ref 6.4–8.3)
PSA SERPL-MCNC: 0.47 NG/ML
SODIUM SERPL-SCNC: 142 MMOL/L (ref 136–145)
TRIGL SERPL-MCNC: 88 MG/DL

## 2023-02-20 NOTE — NURSING NOTE
ASSESSMENT & PLAN:   Diagnoses and all orders for this visit:    Well woman exam with routine gynecological exam  -     Mammo screening bilateral w 3d & cad; Future    Encounter for screening mammogram for malignant neoplasm of breast  -     Mammo screening bilateral w 3d & cad; Future    Other orders  -     Desvenlafaxine Succinate ER (Pristiq) 25 MG TB24; Take 25 mg by mouth  -     Turmeric (QC TUMERIC COMPLEX PO); Take by mouth  -     LORazepam (ATIVAN) 0 5 mg tablet          The following were reviewed in today's visit: ASCCP guidelines, Gardisil vaccination, STD testing breast self exam, mammography screening ordered, menopause, exercise and healthy diet  Patient to return to office in yearly for annual exam      All questions have been answered to her satisfaction  CC:  Annual Gynecologic Examination  Chief Complaint   Patient presents with   • Gynecologic Exam     Pt is here for her yearly exam  Pap due mammo ordered  Pt refused weight  Pt doing well no concerns at this time       HPI: Radha Cobian is a 61 y o  [id number] who presents for annual gynecologic examination    She has the following concerns:  none      Health Maintenance:    Exercise: several times per week  Breast exams/breast awareness: yes  Diet: well balanced diet  Last mammogram:   Colorectal cancer screenin      Past Medical History:   Diagnosis Date   • Abnormal Pap smear of cervix    • Anxiety    • High vitamin A level    • History of anxiety    • History of hypercholesterolemia    • History of hypertension    • History of obesity    • HPV (human papilloma virus) infection    • Lordosis    • Postgastrectomy malabsorption 2017   • Scoliosis    • Secondary hyperparathyroidism, non-renal ([facility] [address] )    • Spinal stenosis        Past Surgical History:   Procedure Laterality Date   • ABDOMINOPLASTY     • BREAST CYST ASPIRATION     • COLONOSCOPY     • COLONOSCOPY  2021    Zach - 5 y f/u    • COLPOSCOPY   Dermatology Rooming Note    Octavio Kirkpatrick's goals for this visit include:   Chief Complaint   Patient presents with     Skin Check     Full body skin check     Maegan Bradley CMA   • COMBINED AUGMENTATION MAMMAPLASTY AND ABDOMINOPLASTY  2001   • FACIAL COSMETIC SURGERY     • FL INJECTION RIGHT HIP (NON ARTHROGRAM)  5/24/2019   • GASTRIC BYPASS     • TUBAL LIGATION         Past OB/Gyn History:   No LMP recorded  Patient is postmenopausal     Menopausal status: postmenopausal  Menopausal symptoms: None    Last Pap: 2021 : no abnormalities  History of abnormal Pap smear: HPV in her 25s, then always normal     Current contraception: tubal ligation      Family History  Family History   Problem Relation Age of Onset   • Hypertension Mother    • Heart disease Mother    • Hypertension Father    • Heart disease Father    • No Known Problems Sister    • No Known Problems Brother    • No Known Problems Maternal Aunt    • No Known Problems Maternal Uncle    • No Known Problems Paternal Aunt    • No Known Problems Paternal Uncle    • No Known Problems Maternal Grandmother    • No Known Problems Maternal Grandfather    • No Known Problems Paternal Grandmother    • No Known Problems Paternal Grandfather    • ADD / ADHD Neg Hx    • Anesthesia problems Neg Hx    • Cancer Neg Hx    • Clotting disorder Neg Hx    • Collagen disease Neg Hx    • Diabetes Neg Hx    • Dislocations Neg Hx    • Learning disabilities Neg Hx    • Neurological problems Neg Hx    • Osteoporosis Neg Hx    • Rheumatologic disease Neg Hx    • Scoliosis Neg Hx    • Vascular Disease Neg Hx        Family history of uterine or ovarian cancer: no  Family history of breast cancer: no  Family history of colon cancer: no    Sister - multiple myeloma    Social History:  Social History     Socioeconomic History   • Marital status:       Spouse name: Not on file   • Number of children: Not on file   • Years of education: Not on file   • Highest education level: Not on file   Occupational History   • Not on file   Tobacco Use   • Smoking status: Never   • Smokeless tobacco: Never   Vaping Use   • Vaping Use: Never used   Substance and Sexual Activity   • Alcohol use: No   • Drug use: No   • Sexual activity: Yes     Partners: Male     Birth control/protection: Female Sterilization   Other Topics Concern   • Not on file   Social History Narrative   • Not on file     Social Determinants of Health     Financial Resource Strain: Not on file   Food Insecurity: Not on file   Transportation Needs: Not on file   Physical Activity: Not on file   Stress: Not on file   Social Connections: Not on file   Intimate Partner Violence: Not on file   Housing Stability: Not on file     Domestic violence screen: negative    Allergies: Allergies   Allergen Reactions   • Other Wheezing     Lobster when a teenager  Wheezing       Medications:    Current Outpatient Medications:   •  Calcium Citrate 1040 MG TABS, Take by mouth 3 (three) times a day, Disp: , Rfl:   •  Desvenlafaxine Succinate ER (Pristiq) 25 MG TB24, Take 25 mg by mouth, Disp: , Rfl:   •  Multiple Vitamins-Minerals (BARIATRIC MULTIVITAMINS/IRON PO), Take by mouth daily, Disp: , Rfl:   •  Turmeric (QC TUMERIC COMPLEX PO), Take by mouth, Disp: , Rfl:   •  LORazepam (ATIVAN) 0 5 mg tablet, , Disp: , Rfl:     Review of Systems:  Review of Systems   Constitutional: Negative for chills and fever  Respiratory: Negative for cough and shortness of breath  Cardiovascular: Negative for chest pain and palpitations  Gastrointestinal: Negative for abdominal distention, abdominal pain, blood in stool, constipation, nausea and vomiting  Genitourinary: Negative for difficulty urinating, dyspareunia, dysuria, frequency, pelvic pain, urgency, vaginal bleeding, vaginal discharge and vaginal pain  Neurological: Negative for headaches  Physical Exam:  /80 (BP Location: Right arm, Patient Position: Sitting, Cuff Size: Standard)   Ht 5' 4" (1 626 m)   BMI 24 03 kg/m²    Physical Exam  Constitutional:       General: She is awake  Appearance: Normal appearance  She is well-developed     Genitourinary: Vulva, bladder and urethral meatus normal       Right Labia: No rash, tenderness or lesions  Left Labia: No tenderness, lesions or rash  No labial fusion noted  No vaginal discharge, erythema, tenderness or bleeding  No vaginal prolapse present  No vaginal atrophy present  Right Adnexa: not tender, not full and no mass present  Left Adnexa: not tender, not full and no mass present  Cervix is parous  No cervical motion tenderness, discharge, lesion or polyp  Uterus is not enlarged, tender or irregular  No uterine mass detected  No urethral prolapse present  Bladder is not tender  Pelvic exam was performed with patient in the lithotomy position  Breasts:     Right: No inverted nipple, mass, nipple discharge, skin change or tenderness  Left: No inverted nipple, mass, nipple discharge, skin change or tenderness  HENT:      Head: Normocephalic and atraumatic  Cardiovascular:      Rate and Rhythm: Normal rate and regular rhythm  Heart sounds: Normal heart sounds  Pulmonary:      Effort: Pulmonary effort is normal  No tachypnea or respiratory distress  Breath sounds: Normal breath sounds  Abdominal:      General: Abdomen is flat  There is no distension  Palpations: Abdomen is soft  Tenderness: There is no abdominal tenderness  There is no guarding or rebound  Musculoskeletal:      Cervical back: Neck supple  Lymphadenopathy:      Upper Body:      Right upper body: No supraclavicular or axillary adenopathy  Left upper body: No supraclavicular or axillary adenopathy  Neurological:      General: No focal deficit present  Mental Status: She is alert and oriented to person, place, and time  Psychiatric:         Mood and Affect: Mood normal          Behavior: Behavior normal          Thought Content: Thought content normal          Judgment: Judgment normal    Vitals reviewed

## 2023-07-23 ENCOUNTER — HOSPITAL ENCOUNTER (EMERGENCY)
Facility: CLINIC | Age: 23
Discharge: HOME | End: 2023-07-23
Attending: EMERGENCY MEDICINE | Admitting: EMERGENCY MEDICINE
Payer: COMMERCIAL

## 2023-07-23 VITALS
DIASTOLIC BLOOD PRESSURE: 82 MMHG | RESPIRATION RATE: 18 BRPM | SYSTOLIC BLOOD PRESSURE: 142 MMHG | OXYGEN SATURATION: 98 % | HEART RATE: 82 BPM

## 2023-07-23 DIAGNOSIS — F43.0 STRESS RESPONSE: ICD-10-CM

## 2023-07-23 PROCEDURE — 99285 EMERGENCY DEPT VISIT HI MDM: CPT | Mod: 25

## 2023-07-23 PROCEDURE — 90791 PSYCH DIAGNOSTIC EVALUATION: CPT

## 2023-07-23 ASSESSMENT — ACTIVITIES OF DAILY LIVING (ADL): ADLS_ACUITY_SCORE: 35

## 2023-07-24 NOTE — DISCHARGE INSTRUCTIONS
"Aftercare Plan  If I am feeling unsafe or I am in a crisis, I will:   Contact my established care providers   Call the National Suicide Prevention Lifeline: 287.501.9156   Go to the nearest emergency room   Call 911     Warning signs that I or other people might notice when a crisis is developing for me:     Worrying about what may or may not happen in future  I am having increasing suicidal thoughts that turn to plans with intent or means  I am having additional urges to self-harm    My emotions are of hopelessness; feeling like there's no way out.  Rage or anger.  Engaging in risky activities without thinking  Withdrawing from family/friends  Dramatic mood swings  Drastic personality changes   Use of alcohol or drugs  Postings on social media  Neglect of personal hygiene or cares     Things I am able to do on my own to cope or help me feel better:    Spending quality time with loved ones  Staying hydrated  Eating balanced meals  Going for a walk every day  Take care of daily responsibilities/needs  Focus on positive self-talk vs negative self-talk  Check out \"Short, free Headspace\" mindfulness on YouTube    Things that I am able to do with others to cope or help me better:   Exercise  Music  Deep breathing  Meditations  Journal  Self-regulate  Self check-in  Ask for help    Things I can use or do for distraction:   Reach out to/spend time with family, friends  Shower  Exercise  Chores or do a project  Listen to music  Watch movie/TV  Listening to music  Journaling  Reading a book  Meditating  Call a friend    Changes I can make to support my mental health and wellness:    -I will abstain from all mood altering chemicals not currently prescribed to me   -I will attend scheduled mental health therapy and psychiatric appointments and follow all   recommendations  -I will commit to 30 minutes of self care daily - this can be as simple as taking a shower, going for a   walk, cooking a meal, read, writing, etc  -I will " practice square breathing when I begin to feel anxious - in breath through the nose for the count   of 4 and the first line on the square. Out breath through the mouth for the count of 4 for the second line   of the square. Repeat to complete the square. Repeat the square as many times as needed.  - I will use distraction skills of: going for walks, watching TV, spending time outside, calling a friend or   family member  -Use community resources, including hotline numbers, [facility] crisis and support meetings  -Maintain a daily schedule/routine  -Practice deep breathing skills  -Download a meditation kajal and spend 15-20 minutes per day mediating/relaxing. Some apps to   download include: Calm, Headspace and Insight Timer. All 3 of these apps have free version    Reduce Extreme Emotion  QUICKLY:  Changing Your Body Chemistry      T:  Change your body Temperature to change your autonomic nervous system   Use Ice Water to calm yourself down FAST   Put your face in a bowl of ice water (this is the best way; have the person keep his/her face in ice water for 30-45 seconds - initial research is showing that the longer s/he can hold her/his face in the water, the better the response), or   Splash ice water on your face, or hold an ice pack on your face      I:  Intensely exercise to calm down a body revved up by emotion   Examples: running, walking fast, jumping, playing basketball, weight lifting, swimming, calisthenics, etc.   Engage in exercises that DO NOT include violent behaviors. Exercises that utilize violent behaviors tend to function as  behavioral rehearsal,  and rather than calming the person down, may actually  rev  the person up more, increasing the likelihood of violence, and lessening the likelihood that they will  burn off  energy     P:  Progressively relax your muscles   Starting with your hands, moving to your forearms, upper arms, shoulders, neck, forehead, eyes, cheeks and lips, tongue and teeth, chest,  upper back, stomach, buttocks, thighs, calves, ankles, feet   Tense (10 seconds,   of the way), then relax each muscle (all the way)   Notice the tension   Notice the difference when relaxed (by tensing first, and then relaxing, you are able to get a more thorough relaxation than by simply relaxing)      P: Paced breathing to relax   The standard technique is to begin with counting the number of steps one takes for a typical inhale, then counting the steps one takes for a typical exhale, and then lengthening the amount of steps for the exhalation by one or two steps.  OR  Repeat this pattern for 1-2 minutes  Inhale for four (4) seconds   Exhale for six (6) to eight (8) seconds   Research demonstrated that one can change one's overall level of anxiety by doing this exercise for even a few minutes per day      People in my life that I can ask for help:   Family  Friends  Providers    Your Community Health has a mental health crisis team you can call 24/7:   [facility] Crisis Line Number: [phone]  [facility] Mental Health Crisis: [phone] - Call the crisis line for immediate mental health support, 24 hours a day.   [facility] Crisis Line Number: [phone]  [facility] Crisis Line Number: [phone]  [facility] Crisis Line Number: [phone]   [facility] Crisis Line Number: [phone]  [place]: [phone]  [place]: [phone]  [facility] Crisis Number: [phone]  [facility] Crisis: [phone]      Other things that are important when I'm in crisis:   Ask for help    Additional resources and information:     Mental Health Apps  My3  https://myHospitalists Nowpp.org/    VirtualHopeBox  https://Kidblog.org/apps/virtual-hope-box/       Professionals or Agencies I Can Contact During A Crisis:       Crisis Lines  Call or Text 988 - National Suicide and Crisis Lifeline    Crisis Text Line  Text 219022  You will be connected with a trained live crisis  "counselor to provide support.    [employer] (LGBTQ Youth Crisis Line)  3.433.918.9696  text START to 338-734    National [place] on Mental Illness (JESSICA)  208.399.3992 or 5.336.JESSICA.HELPS    National Suicide Prevention Lifeline at 2-004-709-YQQO (4313)     Throughout  [place]: call **CRISIS (**206243)     Crisis Text Line: is available for free, 24/7 by texting [place] to 058266    Adbrain  Fast Tracker  Linking people to mental health and substance use disorder resources  OriginOil.Ntirety     [place] Mental Health Warm Line  Peer to peer support  Monday thru Saturday, 12 pm to 10 pm  190.910.5341 or 1.324.758.4931  Text \"Support\" to 27851     National [place] on Mental Illness (www.[place].jessica.org): 319.629.9674 or 315-862-7835     [facility] Phone (free remote counseling): 648.516.1488 Web address:   https://PolyMedix.org/     www.SugarSync (filter for insurance, gender preference, etc.)    [facility]   [phone]  Intake appointment will be virtual, following appointments can be in person or virtual.   **IMMEDIATE OPENINGS**    [facility]  [phone]  *offers individual therapy, medication management and Mental Health Case Workers; can self refer    [facility]  [phone]  *Immediate Openings    [facility]  [phone]  *Immediate Openings    [facility]  [phone]  *Immediate Openings    Please follow up with scheduled providers to ensure all necessary paperwork is filled out prior to your   scheduled telehealth appointments.     Coordinators from [facility] will be calling within two business days to ensure   that you have the resources you may need or provide assistance with scheduling (Phone number: [phone].).    Remember: give the referrals 3 sessions prior to calling it quits. Do you trust them? Do you feel   understood? Do you think they can " help? Check in with yourself after each session    Please reach out to the [facility]([phone]) regarding further mental health appointment needs for this emergency department visit.    [facility] SCHEDULING:  Today you were seen by a licensed mental health professional through [facility], [facility] ([facility])  for a crisis assessment in the Emergency Department at [facility].  It is recommended that you follow up with your estabished providers (psychiatrist, menta health therapist, and/or primary care doctor - as relevant) as soon as possible. Coordinators from [facility] will be calling you in the next 24-48 hours to ensure that you have the resources you need.  You can so contact [facility] coordinators directly at [phone].     [facility] maintains an extensive network of licensed behavioral health providers to connect patients with the services they need.  We do not charge providers a fee to participate in our referral network.  We match patients with providers based on a patient s specific needs, insurance coverage, and location.  Our first effort will be to refer you to a provider within your care system, and will utilize providers outside your care system as needed.

## 2023-07-24 NOTE — ED NOTES
DEC assessment completed. Patient given snack tray and soda. Patient calm and cooperative at this time. DEC  updated with group home phone number.
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
## 2023-07-24 NOTE — CONSULTS
"Diagnostic Evaluation Consultation  Crisis Assessment    Patient Name: Octavio Kirkpatrick  Age:  22 year old  Legal Sex: male  Gender Identity: male  Pronouns:   Race: Black or   Ethnicity: Not  or   Language: English      Patient was assessed: Virtual: [employer]  Patient location: [facility] EMERGENCY DEPT     Referral Data and Chief Complaint  Octavio Kirkpatrick is a 22 year old, male who identifies as male and speaks English.  race is Black or  and ethnicity is Not  or .  Octavio Kirkpatrick presents to the ED via EMS. Patient is presenting to the ED for the following concerns: Suicidal ideation.   Factors that make the mental health crisis life threatening or complex are:   .        Informed Consent and Assessment Methods  Explained the crisis assessment process, including applicable information disclosures and limits to confidentiality, assessed understanding of the process, and obtained consent to proceed with the assessment.  Assessment methods included conducting a formal interview with patient, review of medical records, collaboration with medical staff, and obtaining relevant collateral information from family and community providers when available: done     Patient response to interventions: eager to participate, verbalizes understanding  Coping skills were attempted to reduce the crisis:  none     History of the Crisis   Pt reports he got mad at his group home and said he, \"wanted to jump off a bridge.\" Pt reports he was saying this as an expression of his frustration, and denies SI/SIB/SA/HI and denies plans, means, and intent to harm himself or others. Pt reports that he has never wanted to harm or kill himself, \"I shouldn't have said it.\" Pt reports he is looking for a new group home with his guardian thru [facility]. Pt reports, \"the staff at this group home are amazing, but the management just don't understand anything and I feel " "like they have violated my rights.\"    Brief Psychosocial History  Family:  Single, Children no  Support System:  Other (specify) (Friends)  Employment Status:  unemployed  Source of Income:  social security, disability  Financial Environmental Concerns:  unemployed  Current Hobbies:  music, social media/computer activities, television/movies/videos, writing/journaling/blogging, arts/crafts, poetry reading/writing, reading  Barriers in Personal Life:       Significant Clinical History  Current Anxiety Symptoms:  racing thoughts, excessive worry, anxious  Current Depression/Trauma:     Current Somatic Symptoms:     Current Psychosis/Thought Disturbance:     Current Eating Symptoms:     Chemical Use History:  Alcohol: Other (comments) (ocassionally on holidys)  Benzodiazepines: None  Opiates: None  Cocaine: None  Marijuana: None  Other Use: None   Past diagnosis:  Autism, ADHD, Anxiety Disorder, Depression  Family history:     Past treatment:  Case management, Individual therapy, Psychiatric Medication Management, ARM/CTSS, Supportive Living Environment (group home, skilled nursing house, etc)  Details of most recent treatment:  Pt reports 2 previous ED visits, last in 2021 and has never been to inpatient. Pt reports he currently has an ARMHS worker, psychiatry, and case management. Pt reports he works with crisis stabilization as well.  Other relevant history:          Collateral Information  Is there collateral information: Yes ([facility] , [phone] - reports pt is able to return to group [facility])   Collateral information name, relationship, phone number:     What happened today:     What is different about patient's functioning:     Concern about alcohol/drug use:    What do you think the patient needs:    Has patient made comments about wanting to kill themselves/others:    If d/c is recommended, can they take part in safety/aftercare planning:     Additional collateral information:  Writer attempted " to contact pt guardian at [facility] - [phone]. Voicemail left.     Risk Assessment  [place] Suicide Severity Rating Scale Full Clinical Version:  Suicidal Ideation  Q1 Wish to be Dead (Lifetime): No  Q2 Non-Specific Active Suicidal Thoughts (Lifetime): No     Suicidal Behavior (Lifetime)  Actual Attempt (Lifetime): No  Has subject engaged in non-suicidal self-injurious behavior? (Lifetime): No  Interrupted Attempts (Lifetime): No  Aborted or Self-Interrupted Attempt (Lifetime): No  Preparatory Acts or Behavior (Lifetime): No    [place] Suicide Severity Rating Scale Since Last Contact:   ZZZ  Actual Attempt (Lifetime): No  Has subject engaged in non-suicidal self-injurious behavior? (Lifetime): No  Interrupted Attempts (Lifetime): No  Aborted or Self-Interrupted Attempt (Lifetime): No  Preparatory Acts or Behavior (Lifetime): No          Environmental or Psychosocial Events: neither working nor attending school, impulsivity/recklessness, challenging interpersonal relationships  Protective Factors: Protective Factors: sense of importance of health and wellness, lives in a responsibly safe and stable environment, able to access care without barriers, supportive ongoing medical and mental health care relationships, cultural, spiritual , or Latter day beliefs associated with meaning and value in life    Does the patient have thoughts of harming others? Feels Like Hurting Others: no  Previous Attempt to Hurt Others: no  Is the patient engaging in sexually inappropriate behavior?: no    Is the patient engaging in sexually inappropriate behavior?  no        Current Substance Abuse  Is there recent substance abuse?     Was a urine drug screen or blood alcohol level obtained:    Mental health and substance abuse treatment history:       Mental Status Exam   Affect: Appropriate  Appearance: Appropriate  Attention Span/Concentration: Attentive  Eye Contact: Engaged    Fund of Knowledge: Appropriate   Language /Speech  Content: Fluent  Language /Speech Volume: Normal  Language /Speech Rate/Productions: Normal  Recent Memory: Intact  Remote Memory: Intact  Mood: Normal  Orientation to Person: Yes   Orientation to Place: Yes  Orientation to Time of Day: Yes  Orientation to Date: Yes     Situation (Do they understand why they are here?): Yes  Psychomotor Behavior: Normal  Thought Content: Clear  Thought Form: Intact     Mini-Cog Assessment  Number of Words Recalled:    Clock-Drawing Test:     Three Item Recall:    Mini-Cog Total Score:       Medication  Psychotropic medications:   Medication Orders - Psychiatric (From admission, onward)    None           Current Care Team  Patient Care Team:  Jarvis Cooper DO as PCP - General  Werner Das MD as MD (Pediatrics)  Elton Esparza APRN CNP as Nurse Practitioner (Nurse Practitioner)  Wesly Bowman MD as MD (Urology)  Rosetta Bro RN as Registered Nurse (Urology)  Leopoldo Gill MD as MD (Dermatology)  Danny Pate MD as Assigned Surgical Provider  Jarvis Cooper DO as Assigned PCP    Diagnosis  Patient Active Problem List   Diagnosis Code     Elevated serum creatinine R79.89     Skin cancer screening Z12.83     Multiple melanocytic nevi D22.9     Acute kidney injury (H) N17.9     Acute respiratory failure with hypoxia (H) J96.01     Anticoagulation monitoring, INR range 2-3 Z79.01     Anxiety F41.9     Attention deficit hyperactivity disorder (ADHD), combined type F90.2     Autistic disorder F84.0     Fetal alcohol syndrome Q86.0     History of psychosis Z86.59     Intellectual disability F79     Pulmonary embolism (H) I26.99     Normocytic anemia D64.9     Autism F84.0       Clinical Summary and Substantiation of Recommendations   After therapeutic assessment, intervention and aftercare planning by ED care team and LMHP and in consultation with attending provider, the patient's circumstances and mental state were appropriate for outpatient  management. It is the recommendation of this clinician that pt discharge with OP MH support. A this time the pt is not presenting as an acute risk to self or others due to the following factors: Pt presents to ED after getting upset at his  and stating he wanted to jump off a bridge. Pt reports this was said out of emotion and he does not want to die. Pt denies SI/SIB/SA/HI. Pt engaged in safety planning and is able to return to his group.                          Patient coping skills attempted to reduce the crisis:  none    Disposition  Recommended disposition: Medication Management, Group Home        Reviewed case and recommendations with attending provider. Attending Name: Dr. Zavala       Attending concurs with disposition: yes       Patient and/or validated legal guardian concurs with disposition:   yes       Final disposition:  discharge    Legal status on admission: Guardian/ad litum    Assessment Details   Total duration spent on the patient case in minutes: 20 min (5 additional minutes for collateral)     CPT code(s) utilized: Non-Billable    ILYA Richards, LADC, Psychotherapist  [facility] - Triage & Transition Services  Callback: [phone]

## 2023-07-24 NOTE — ED TRIAGE NOTES
Pt presents via ems from a group home on a transport hold. Per hold paperwork pt made comments about wanting to jump off of a bridge. Pt denies SI to writer and says pt has been stressed and that is why those comments were made. Pt reports he will be changing group homes in august. Pt reports guardianship on file is inaccurate and that he is now with [facility].      Triage Assessment     Row Name 07/23/23 1941       Triage Assessment (Adult)    Airway WDL WDL       Respiratory WDL    Respiratory WDL WDL       Skin Circulation/Temperature WDL    Skin Circulation/Temperature WDL WDL       Cardiac WDL    Cardiac WDL WDL       Peripheral/Neurovascular WDL    Peripheral Neurovascular WDL WDL       Cognitive/Neuro/Behavioral WDL    Cognitive/Neuro/Behavioral WDL X;mood/behavior    Mood/Behavior excitable;cooperative

## 2023-08-08 ENCOUNTER — HOSPITAL ENCOUNTER (EMERGENCY)
Facility: CLINIC | Age: 23
Discharge: HOME | End: 2023-08-08
Attending: EMERGENCY MEDICINE | Admitting: EMERGENCY MEDICINE
Payer: COMMERCIAL

## 2023-08-08 VITALS
SYSTOLIC BLOOD PRESSURE: 141 MMHG | RESPIRATION RATE: 18 BRPM | OXYGEN SATURATION: 97 % | DIASTOLIC BLOOD PRESSURE: 75 MMHG | HEART RATE: 91 BPM

## 2023-08-08 DIAGNOSIS — F41.9 ANXIETY: ICD-10-CM

## 2023-08-08 LAB
ALBUMIN SERPL BCG-MCNC: 4.1 G/DL (ref 3.5–5.2)
ALP SERPL-CCNC: 77 U/L (ref 40–129)
ALT SERPL W P-5'-P-CCNC: 14 U/L (ref 0–70)
ANION GAP SERPL CALCULATED.3IONS-SCNC: 11 MMOL/L (ref 7–15)
APAP SERPL-MCNC: <5 UG/ML (ref 10–30)
AST SERPL W P-5'-P-CCNC: 18 U/L (ref 0–45)
ATRIAL RATE - MUSE: 80 BPM
BASOPHILS # BLD AUTO: 0.1 10E3/UL (ref 0–0.2)
BASOPHILS NFR BLD AUTO: 1 %
BILIRUB SERPL-MCNC: 0.3 MG/DL
BUN SERPL-MCNC: 13.2 MG/DL (ref 6–20)
CALCIUM SERPL-MCNC: 9.2 MG/DL (ref 8.6–10)
CHLORIDE SERPL-SCNC: 105 MMOL/L (ref 98–107)
CREAT SERPL-MCNC: 1.5 MG/DL (ref 0.67–1.17)
DEPRECATED HCO3 PLAS-SCNC: 23 MMOL/L (ref 22–29)
DIASTOLIC BLOOD PRESSURE - MUSE: NORMAL MMHG
EOSINOPHIL # BLD AUTO: 0.3 10E3/UL (ref 0–0.7)
EOSINOPHIL NFR BLD AUTO: 5 %
ERYTHROCYTE [DISTWIDTH] IN BLOOD BY AUTOMATED COUNT: 12 % (ref 10–15)
GFR SERPL CREATININE-BSD FRML MDRD: 67 ML/MIN/1.73M2
GLUCOSE SERPL-MCNC: 103 MG/DL (ref 70–99)
HCT VFR BLD AUTO: 45.5 % (ref 40–53)
HGB BLD-MCNC: 15.6 G/DL (ref 13.3–17.7)
IMM GRANULOCYTES # BLD: 0 10E3/UL
IMM GRANULOCYTES NFR BLD: 0 %
INR PPP: 1.16 (ref 0.85–1.15)
INTERPRETATION ECG - MUSE: NORMAL
LYMPHOCYTES # BLD AUTO: 2.3 10E3/UL (ref 0.8–5.3)
LYMPHOCYTES NFR BLD AUTO: 40 %
MCH RBC QN AUTO: 30.9 PG (ref 26.5–33)
MCHC RBC AUTO-ENTMCNC: 34.3 G/DL (ref 31.5–36.5)
MCV RBC AUTO: 90 FL (ref 78–100)
MONOCYTES # BLD AUTO: 0.4 10E3/UL (ref 0–1.3)
MONOCYTES NFR BLD AUTO: 6 %
NEUTROPHILS # BLD AUTO: 2.7 10E3/UL (ref 1.6–8.3)
NEUTROPHILS NFR BLD AUTO: 48 %
NRBC # BLD AUTO: 0 10E3/UL
NRBC BLD AUTO-RTO: 0 /100
P AXIS - MUSE: 58 DEGREES
PLATELET # BLD AUTO: 304 10E3/UL (ref 150–450)
POTASSIUM SERPL-SCNC: 4 MMOL/L (ref 3.4–5.3)
PR INTERVAL - MUSE: 144 MS
PROT SERPL-MCNC: 6.7 G/DL (ref 6.4–8.3)
QRS DURATION - MUSE: 88 MS
QT - MUSE: 350 MS
QTC - MUSE: 403 MS
R AXIS - MUSE: 51 DEGREES
RBC # BLD AUTO: 5.05 10E6/UL (ref 4.4–5.9)
SALICYLATES SERPL-MCNC: <0.3 MG/DL
SODIUM SERPL-SCNC: 139 MMOL/L (ref 136–145)
SYSTOLIC BLOOD PRESSURE - MUSE: NORMAL MMHG
T AXIS - MUSE: 52 DEGREES
VENTRICULAR RATE- MUSE: 80 BPM
WBC # BLD AUTO: 5.7 10E3/UL (ref 4–11)

## 2023-08-08 PROCEDURE — 99285 EMERGENCY DEPT VISIT HI MDM: CPT | Mod: 25

## 2023-08-08 PROCEDURE — 90791 PSYCH DIAGNOSTIC EVALUATION: CPT

## 2023-08-08 PROCEDURE — 93005 ELECTROCARDIOGRAM TRACING: CPT | Mod: RTG

## 2023-08-08 PROCEDURE — 80053 COMPREHEN METABOLIC PANEL: CPT | Performed by: EMERGENCY MEDICINE

## 2023-08-08 PROCEDURE — 99284 EMERGENCY DEPT VISIT MOD MDM: CPT

## 2023-08-08 PROCEDURE — 80179 DRUG ASSAY SALICYLATE: CPT | Performed by: EMERGENCY MEDICINE

## 2023-08-08 PROCEDURE — 85025 COMPLETE CBC W/AUTO DIFF WBC: CPT | Performed by: EMERGENCY MEDICINE

## 2023-08-08 PROCEDURE — 85610 PROTHROMBIN TIME: CPT | Performed by: EMERGENCY MEDICINE

## 2023-08-08 PROCEDURE — 36415 COLL VENOUS BLD VENIPUNCTURE: CPT | Performed by: EMERGENCY MEDICINE

## 2023-08-08 PROCEDURE — 80143 DRUG ASSAY ACETAMINOPHEN: CPT | Performed by: EMERGENCY MEDICINE

## 2023-08-08 ASSESSMENT — ACTIVITIES OF DAILY LIVING (ADL)
ADLS_ACUITY_SCORE: 35

## 2023-08-08 NOTE — CONSULTS
"Diagnostic Evaluation Consultation  Crisis Assessment    Patient Name: Octavio Kirkpatrick  Age:  22 year old  Legal Sex: male  Gender Identity: male  Pronouns:   Race: Black or   Ethnicity: Not  or   Language: English      Patient was assessed: Virtual: [employer]      Patient location: [facility] EMERGENCY DEPT                             [facility]    Referral Data and Chief Complaint  Octavio Kirkpatrick presents to the ED via EMS. Patient is presenting to the ED for the following concerns: Anxiety, Other (see comment) (possible excess ingestion).   Factors that make the mental health crisis life threatening or complex are:  Patient does not seem to believe he needs guardian(s) of any kind.  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   When asked about specifics, patient talks about contacting State Representative about guardianship needPt BIBA from  d/t \"meds being unaccounted for\". Per EMS,  manager said pt took more pills, specifically gabapentin, than scheduled. Pt denies..      Informed Consent and Assessment Methods  Explained the crisis assessment process, including applicable information disclosures and limits to confidentiality, assessed understanding of the process, and obtained consent to proceed with the assessment.  Assessment methods included conducting a formal interview with patient, review of medical records, collaboration with medical staff, and obtaining relevant collateral information from family and community providers when available.  :       Patient response to interventions:    Coping skills were attempted to reduce the crisis:        History of the Crisis   Pt has hx of FAS, intellectual disability ADHD and others.  Pt has had a difficult time with attachment and maintaining relationships.   Pt is impulsive.  Pt has switched guardians a few times and has issues with trust.  Pt may be on LBGTQ spectrum of " "sexuality.   pt says he handled today's \"misunderstanding\" well but pt clearly ruminating on events of tonight.  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 asked what school attending)  Source of Income:     Financial Environmental Concerns:     Current Hobbies:  music, social media/computer activities, television/movies/videos, writing/journaling/blogging, arts/crafts, poetry reading/writing, reading  Barriers in Personal Life:  behavioral concerns, cognitive limitations    Significant Clinical History  Current Anxiety Symptoms:  racing thoughts, excessive worry, anxious  Current Depression/Trauma:  difficulty concentrating  Current Somatic Symptoms:     Current Psychosis/Thought Disturbance:  impulsive, hyperactive  Current Eating Symptoms:     Chemical Use History:      Past diagnosis:  ADHD, Anxiety Disorder, Autism  Family history:   (pt adopted.  likely biological MI/CD but unknown)  Past treatment:  Individual therapy, Case management, Psychiatric Medication Management, Day Treatment  Details of most recent treatment:  Pt says he has supports but that  is not a good fit.  Patient wants new medication manager  Other relevant history:          Collateral Information  Is there collateral information: Yes (Former guardian, adoptive mother Kadie)     Collateral information name, relationship, phone number:  Kadie Geiger, adoptive mother and former guardian    What happened today: not sure of tonight but pt has court date around guardianship and that is likely issue.  not much contact lately     What is different about patient's functioning: no -- except worries about guardianship court date by patient     Concern about alcohol/drug use: no    What do you think " the patient needs:      Has patient made comments about wanting to kill themselves/others: no    If d/c is recommended, can they take part in safety/aftercare planning:   (no longer have much contact)    Additional collateral information:  Pt [facility] in [facility]     Risk Assessment  [place] Suicide Severity Rating Scale Full Clinical Version 7/23/2023    Suicidal Ideation  Q1 Wish to be Dead (Lifetime): No  Q2 Non-Specific Active Suicidal Thoughts (Lifetime): No     Suicidal Behavior (Lifetime)  Actual Attempt (Lifetime): No  Has subject engaged in non-suicidal self-injurious behavior? (Lifetime): No  Interrupted Attempts (Lifetime): No  Aborted or Self-Interrupted Attempt (Lifetime): No  Preparatory Acts or Behavior (Lifetime): No        [place] Suicide Severity Rating Scale Recent: 0723/2023  Suicidal Ideation (Recent)  Q1 Wished to be Dead (Past Month): no  Q2 Suicidal Thoughts (Past Month): no  Q3 Suicidal Thought Method: no  Q5 Suicide Intent with Specific Plan: no  Level of Risk per Screen: low risk  Intensity of Ideation (Recent)  Most Severe Ideation Rating (Past 1 Month): 2  Frequency (Past 1 Month): Less than once a week  Duration (Past 1 Month): Fleeting, few seconds or minutes  Controllability (Past 1 Month): Can control thoughts with little difficulty  Reasons for Ideation (Past 1 Month): Mostly to get attention, revenge, or a reaction from others  Suicidal Behavior (Recent)  Actual Attempt (Past 3 Months): No  Has subject engaged in non-suicidal self-injurious behavior? (Past 3 Months): No  Interrupted Attempts (Past 3 Months): No  Preparatory Acts or Behavior (Past 3 Months): No  Total Number of Preparatory Acts (Past 3 Months): 0    Environmental or Psychosocial Events: impulsivity/recklessness  Protective Factors: Protective Factors: lives in a responsibly safe and stable environment, sense of importance of health and wellness, optimistic outlook - identification of future goals, reality  testing ability    Does the patient have thoughts of harming others? Feels Like Hurting Others: no  Previous Attempt to Hurt Others: no  Is the patient engaging in sexually inappropriate behavior?: yes    Is the patient engaging in sexually inappropriate behavior?  yes        Mental Status Exam   Affect: Appropriate  Appearance: Appropriate  Attention Span/Concentration: Attentive  Eye Contact: Engaged    Fund of Knowledge: Appropriate   Language /Speech Content: Fluent  Language /Speech Volume: Normal  Language /Speech Rate/Productions: Normal  Recent Memory: Intact  Remote Memory: Intact  Mood: Anxious  Orientation to Person: Yes   Orientation to Place:    Orientation to Time of Day: Yes  Orientation to Date: Yes     Situation (Do they understand why they are here?): Yes  Psychomotor Behavior: Normal  Thought Content: Clear  Thought Form: Intact     Mini-Cog Assessment  Number of Words Recalled:    Clock-Drawing Test:     Three Item Recall:    Mini-Cog Total Score:       Medication  Psychotropic medications:   Medication Orders - Psychiatric (From admission, onward)    None           Current Care Team  Patient Care Team:  Jarvis Cooper DO as PCP - General  Werner Das MD as MD (Pediatrics)  Elton Esparza APRN CNP as Nurse Practitioner (Nurse Practitioner)  Wesly Bomwan MD as MD (Urology)  Rosetta Bro, RN as Registered Nurse (Urology)  Leopoldo Gill MD as MD (Dermatology)  Danny Pate MD as Assigned Surgical Provider  Jarvis Cooper DO as Assigned PCP  Jeremiah Engle MD as Assigned Cancer Care Provider    Diagnosis  Patient Active Problem List   Diagnosis Code     Elevated serum creatinine R79.89     Skin cancer screening Z12.83     Multiple melanocytic nevi D22.9     Acute kidney injury (H) N17.9     Acute respiratory failure with hypoxia (H) J96.01     Anticoagulation monitoring, INR range 2-3 Z79.01     Anxiety F41.9     Attention deficit hyperactivity disorder  (ADHD), combined type F90.2     Autistic disorder F84.0     Fetal alcohol syndrome Q86.0     History of psychosis Z86.59     Intellectual disability F79     Pulmonary embolism (H) I26.99     Normocytic anemia D64.9     Autism F84.0       Primary Problem This Admission  Active Hospital Problems    *Anxiety        Clinical Summary and Substantiation of Recommendations   Pt residing at Group home.  Has hx of wanting independence.   Pt is not imminently SI or HI and should be d/c back to  facilit       Patient coping skills attempted to reduce the crisis:   Has good relationship with [facility] from  per pt.     Disposition  Recommended disposition:     Return to current group home     Reviewed case and recommendations with attending provider. Attending Name:    Zoltan Harrell MD     Attending concurs with disposition:    Yes     Patient and/or validated legal guardian concurs with disposition: Yes, on file           Final disposition:   Return to Group Home     Assessment Details   Total duration spent on the patient case in minutes: 14 min     CPT code(s) utilized: Non-Billable    ORESTES Harrison, Psychotherapist  [facility] - Triage & Transition Services  Callback: [phone]

## 2023-08-08 NOTE — ED NOTES
Pt provided RN with  staff phone number. called NOC shift at  phone number [place], [phone]. No answe.

## 2023-08-08 NOTE — ED TRIAGE NOTES
"Pt BIBA from [facility] d/t \"meds being unaccounted for\". Per EMS, [facility] manager said pt took more pills, specifically gabapentin, than scheduled. Pt's story and EMS report is hard to follow. Pt states \"[facility] is not ran well it is new\" and \"I want to speak to a male doctor I don't get along with females\". VSS, A&Ox4, ABCs intact.        "

## 2023-08-08 NOTE — PROGRESS NOTES
DEC /writer tried multiple numbers we have used in past for pt group home.  No answer at any of those numbers to help facilitate/confirm pt can and should return to current group home facility.   Pt adoptive mother, Kadie, who says she is not current active guardian, says it is pt's upcoming court date about guardianship is likely pt's source of current anxiety. Adoptive parents/former guardians say they no longer have much contact with patient.

## 2023-08-08 NOTE — ED NOTES
RN called [facility] manager multiple times at [phone] with no answer. Will continue to try to get in contact with [facility]. Pt VSS, calm and cooperative lying in bed.

## 2023-08-08 NOTE — ED PROVIDER NOTES
History     Chief Complaint:  Drug / Alcohol Assessment       The history is provided by the patient.      Octavio Kirkpatrick is a 22 year old male with a history of autism disorder, anxiety, fetal alcohol syndrome, and psychosis who presents with drug/alcohol assessment. Earlier tonight, the staff at his group home through he might have swallowed extra of his medications, so they called EMS to bring him to the ED. At bedside, he denies doing this. He has not been depressed or suicidal lately, but he endorse feeling anxious about an upcoming court date. He denies using illegal drugs or alcohol. He further denies cough, fevers, or any other cold symptoms.     Independent Historian:   None - Patient Only    Review of External Notes:   None    Medications:    Eliquis   Flonase   Gabapentin   Atarax   Zestril   Concerta   Ditropan   Seroquel   Zoloft   Detrol     Past Medical History:    ADHD   Anxiety  Autistic disorder   Heart murmur   Hernia   Thrombophlebitis   Palpitations   Prostate infection   Fetal alcohol syndrome   Psychosis   PE  Intellectual disability     Past Surgical History:    Tonsillectomy   Hernia repair   PE tube insertion   T & A   Lynn teeth extraction      Physical Exam   Patient Vitals for the past 24 hrs:   BP Pulse Resp SpO2   08/08/23 0029 -- 91 -- --   08/08/23 0026 (!) 141/75 -- -- --   08/08/23 0024 -- -- -- 97 %   08/08/23 0022 -- -- 18 --   08/08/23 0019 -- -- -- 98 %        Physical Exam  General: Patient is alert, awake and interactive when I enter the room  Head: The scalp, face, and head appear normal  Eyes: Conjunctivae are normal  ENT: The nose is normal, Pinnae are normal, External acoustic canals are normal  Neck: Trachea midline  CV: Pulses are normal.   Resp: No respiratory distress   Musc: Normal muscular tone, moving all extremities.  Skin: No rash or lesions noted  Neuro:  Speech is normal and fluent. Face is symmetric.   Psych: Normal affect.  Appropriate interactions.   Denies any suicidal or homicidal ideation.  Denies any auditory or visual hallucinations.    Emergency Department Course   ECG:  ECG taken at 0104, ECG read at 0111  Normal sinus rhythm with sinus arrhythmia   Nonspecific T wave abnormality   Abnormal ECG   Rate 80 bpm. HI interval 144 ms. QRS duration 88 ms. QT/QTc 350/403 ms. P-R-T axes 58 51 52.     Laboratory:  Labs Ordered and Resulted from Time of ED Arrival to Time of ED Departure   COMPREHENSIVE METABOLIC PANEL - Abnormal       Result Value    Sodium 139      Potassium 4.0      Chloride 105      Carbon Dioxide (CO2) 23      Anion Gap 11      Urea Nitrogen 13.2      Creatinine 1.50 (*)     Calcium 9.2      Glucose 103 (*)     Alkaline Phosphatase 77      AST 18      ALT 14      Protein Total 6.7      Albumin 4.1      Bilirubin Total 0.3      GFR Estimate 67     INR - Abnormal    INR 1.16 (*)    ACETAMINOPHEN LEVEL - Abnormal    Acetaminophen <5.0 (*)    SALICYLATE LEVEL - Normal    Salicylate <0.3     CBC WITH PLATELETS AND DIFFERENTIAL    WBC Count 5.7      RBC Count 5.05      Hemoglobin 15.6      Hematocrit 45.5      MCV 90      MCH 30.9      MCHC 34.3      RDW 12.0      Platelet Count 304      % Neutrophils 48      % Lymphocytes 40      % Monocytes 6      % Eosinophils 5      % Basophils 1      % Immature Granulocytes 0      NRBCs per 100 WBC 0      Absolute Neutrophils 2.7      Absolute Lymphocytes 2.3      Absolute Monocytes 0.4      Absolute Eosinophils 0.3      Absolute Basophils 0.1      Absolute Immature Granulocytes 0.0      Absolute NRBCs 0.0        Emergency Department Course & Assessments:    Interventions:  Medications - No data to display     Assessments:  0038 I obtained history and examined the patient as noted above.  0350 I rechecked the patient and explained findings. I discussed plan for discharge home.    Independent Interpretation (X-rays, CTs, rhythm strip):  None     Consultations/Discussion of Management or Tests:  0346 I spoke with  DEC.    Social Determinants of Health affecting care:   None    Disposition:  The patient was discharged to home.     Impression & Plan    Medical Decision Making:  Patient is a 22-year-old gentleman with past medical history of autism, anxiety and fetal alcohol syndrome who presents to the emergency department with possible drug overdose.  Patient's group home staff was concerned that he may have taken extra doses of his gabapentin.  Patient denies taking any extra doses.  Further denies any suicidal or homicidal ideations.  Denies any thoughts of self-harm.  Denies any recent actions of self-harm.  Denies any illicit or recreational drug use.  Patient underwent overdose work-up in the emergency department which was reassuring.  He spoke with our mental health assessment team who did not feel that he required any further work-up or inpatient psychiatric placement.  We attempted to contact the patient's group home on several occasions but were unsuccessful.  We will attempt to contact them again in the morning.     Diagnosis:    ICD-10-CM    1. Anxiety  F41.9            Scribe Disclosure:  I, Felix Delgado, am serving as a scribe at 12:50 AM on 8/8/2023 to document services personally performed by Zoltan Stephens MD based on my observations and the provider's statements to me.   8/8/2023   Zoltan Stephens Christopher Joseph, MD  08/08/23 0629

## 2023-08-25 DIAGNOSIS — I26.02 ACUTE SADDLE PULMONARY EMBOLISM WITH ACUTE COR PULMONALE (H): ICD-10-CM

## 2023-08-25 NOTE — PROGRESS NOTES
Fax received from [employer] requesting refill of this patient's medication. Patient is due for annual visit Nov 2023. Will send message to  to schedule patient for a return visit. 3 month refill granted.    Leslie ART, RN, PHN   [facility]   Office: [phone]  Fax: [phone]

## 2023-08-27 ENCOUNTER — HOSPITAL ENCOUNTER (EMERGENCY)
Facility: CLINIC | Age: 23
Discharge: HOME | End: 2023-08-28
Attending: EMERGENCY MEDICINE | Admitting: EMERGENCY MEDICINE
Payer: COMMERCIAL

## 2023-08-27 DIAGNOSIS — R45.851 SUICIDAL THOUGHTS: ICD-10-CM

## 2023-08-27 PROCEDURE — 99285 EMERGENCY DEPT VISIT HI MDM: CPT | Mod: 25

## 2023-08-27 ASSESSMENT — ACTIVITIES OF DAILY LIVING (ADL): ADLS_ACUITY_SCORE: 35

## 2023-08-28 VITALS
RESPIRATION RATE: 16 BRPM | HEIGHT: 73 IN | SYSTOLIC BLOOD PRESSURE: 143 MMHG | HEART RATE: 87 BPM | OXYGEN SATURATION: 99 % | DIASTOLIC BLOOD PRESSURE: 96 MMHG | BODY MASS INDEX: 24.65 KG/M2 | WEIGHT: 186 LBS | TEMPERATURE: 98.1 F

## 2023-08-28 PROCEDURE — 90791 PSYCH DIAGNOSTIC EVALUATION: CPT

## 2023-08-28 ASSESSMENT — ACTIVITIES OF DAILY LIVING (ADL)
ADLS_ACUITY_SCORE: 35
ADLS_ACUITY_SCORE: 35

## 2023-08-28 NOTE — DISCHARGE INSTRUCTIONS
Pt was evaluated by Licensed Mental Health Professional today.  Recommendations:  Follow up with established providers.  Consider seeing individual therapist.  [facility] [phone]  Suicide Crisis Line call or text [phone]

## 2023-08-28 NOTE — ED TRIAGE NOTES
Pt present via EMS, per EMS pt threats of jumping off bridge to care giver but states to ems he would not do this.    Pt states he has been having a lot of stressors d/t not being able find a guardian and court dates     [facility] will pcik him up when ready for discharge

## 2023-08-28 NOTE — ED NOTES
Bed: 2  Expected date:   Expected time:   Means of arrival:   Comments:  Jane 2 22m anxiety, suicidal threats

## 2023-08-28 NOTE — ED PROVIDER NOTES
History     Chief Complaint:  Suicidal       HPI   Octavio Kirkpatrick is a 22 year old male who was brought to the emergency department with concerns for his safety.  He currently resides in a group home.  His parents are no longer willing to be his guardian as he has been violent with them and he has been stressed due to the need to find a new guardian.  He additionally has an upcoming court case.  He states that earlier tonight he verbalized suicidal feelings.  EMS report that he had told his group home that he was going to jump off a bridge, however, he then told EMS that he would not do that.  He tells me that he did verbalize suicidal statements but is no longer feeling that way and feels safe.  He is willing to talk to mental health  and would like to go back to his group home.  He has no physical complaints today and states that he no longer has any mental health concerns today either.    Independent Historian:    EMS - They report as above    Review of External Notes:  Reviewed outside notes form this month    Medications:    apixaban ANTICOAGULANT (ELIQUIS) 5 MG tablet  Fesoterodine Fumarate 8 MG TB24  fluticasone (FLONASE) 50 MCG/ACT nasal spray  gabapentin (NEURONTIN) 100 MG capsule  hydrOXYzine (ATARAX) 25 MG tablet  lisinopril (ZESTRIL) 5 MG tablet  methylphenidate (CONCERTA) 36 MG CR tablet  oxybutynin ER (DITROPAN XL) 10 MG 24 hr tablet  QUEtiapine (SEROQUEL) 100 MG tablet  Sertraline HCl (ZOLOFT PO)  terbinafine (LAMISIL) 1 % external cream  tolterodine ER (DETROL LA) 4 MG 24 hr capsule  UNABLE TO FIND  VITAMIN D, CHOLECALCIFEROL, PO        Past Medical History:    Past Medical History:   Diagnosis Date    ADHD     Anxiety     Autistic disorder     Heart murmur     Hernia, abdominal     History of thrombophlebitis     Palpitations     Prostate infection        Past Surgical History:    Past Surgical History:   Procedure Laterality Date    ENT SURGERY      tonsillectomy    HERNIA REPAIR    "   PE TUBES      TONSILLECTOMY & ADENOIDECTOMY      wisdom teeth            Physical Exam   Patient Vitals for the past 24 hrs:   BP Temp Temp src Pulse Resp SpO2 Height Weight   08/27/23 2203 (!) 143/96 98.1  F (36.7  C) Temporal 87 18 99 % 1.854 m (6' 1\") 84.4 kg (186 lb)        Physical Exam  General: Resting on the gurney, appears comfortable  Head:  The scalp, face, and head appear normal  Mouth/Throat: Mucus membranes are moist  CV:  Regular rate    Normal S1 and S2  No pathological murmur   Resp:  Breath sounds clear and equal bilaterally    Non-labored, no retractions or accessory muscle use    No coarseness    No wheezing   GI:  Abdomen is soft, no rigidity    No tenderness to palpation  MS:  Normal motor assessment of all extremities.    Good capillary refill noted.  Skin:  No rash or lesions noted.  Neuro:   Speech is normal and fluent. No apparent deficit.  Psych: Awake. Alert.  Bright affect, occasional inappropriate laughter, agreeable.        Emergency Department Course     Emergency Department Course & Assessments:    PSS-3      Date and Time Over the past 2 weeks have you felt down, depressed, or hopeless? Over the past 2 weeks have you had thoughts of killing yourself? Have you ever attempted to kill yourself? When did this last happen? User   08/27/23 2209 yes no yes more than 6 months ago [facility]                Suicide assessment completed by mental health (D.E.C., LCSW, etc.)    Interventions:  Medications - No data to display   Consultations/Discussion of Management or Tests:  DEC interviewed the patient and we discussed the patient's care.  Patient will be discharged back to his group home.     Social Determinants of Health affecting care:  Legal Problems/Incarceration and Social Connections/Isolation     Disposition:  The patient was discharged to home.     Impression & Plan        Medical Decision Making:  Octavio Kirkpatrick is a 22 year old male who presents for evaluation of suicidal " statements.  They have a history of previous psychiatric illness and at this point appear at baseline psychiatrically.  The patient has had increasing thoughts of self harm, though these are recurring and he now feels better.  They are currently not a risk to themselves or others.      The patient is able to contract for safety, and has a responsible adult who will stay with them and is comfortable with the plan for discharge.    Diagnosis:    ICD-10-CM    1. Suicidal thoughts  R45.851              MD Maddie Sparrow Karah M, MD  08/28/23 0749

## 2023-08-28 NOTE — CONSULTS
Diagnostic Evaluation Consultation  Crisis Assessment    Patient Name: Octavio Kirkpatrick  Age:  22 year old  Legal Sex: male  Gender Identity: male  Pronouns:   Race: Black or   Ethnicity: Not  or   Language: English      Patient was assessed: Virtual: [place] Crisis Assessment Start Time: 0115 Crisis Assessment Stop Time: 0130  Patient location: [facility] EMERGENCY DEPT                             [facility]    Referral Data and Chief Complaint  Octavio Kirkpatrick presents to the ED via EMS. Patient is presenting to the ED for the following concerns: Suicidal ideation.   Factors that make the mental health crisis life threatening or complex are:  Pt lives in a group home. Per staff there, Pt sometimes makes suicidal statements when he is frustrated or angry. Staff attempt to de-escalate in the facility or with other services but takr Pt's SI very.      Informed Consent and Assessment Methods  Explained the crisis assessment process, including applicable information disclosures and limits to confidentiality, assessed understanding of the process, and obtained consent to proceed with the assessment.  Assessment methods included conducting a formal interview with patient, review of medical records, collaboration with medical staff, and obtaining relevant collateral information from family and community providers when available.  : other (see comments) (legal guardian not available)     Patient response to interventions: eager to participate  Coping skills were attempted to reduce the crisis:   staff say they take Pt's suicidal comments seriously. [facility] crisis number and suicide crisis line information provided     History of the Crisis   Pt has had prior ED admissions for similar concerns. Pt has ongoing frustration with group home and guardians. When angry he often makes suicidal comments.    Brief Psychosocial History  Family:  Single, Children no  Support System:  Facility  resident(s)/Staff  Employment Status:  disabled  Source of Income:  disability  Financial Environmental Concerns:  No concerns identified  Current Hobbies:  exercise/fitness, games, music, social media/computer activities  Barriers in Personal Life:       Significant Clinical History  Current Anxiety Symptoms:     Current Depression/Trauma:     Current Somatic Symptoms:     Current Psychosis/Thought Disturbance:  hyperverbal, flight of ideas  Current Eating Symptoms:     Chemical Use History:  Alcohol: None  Benzodiazepines: None  Opiates: None  Cocaine: None  Marijuana: None  Other Use: None   Past diagnosis:  ADHD, Autism, Anxiety Disorder, Other (Intellectual Disability)  Family history:     Past treatment:  Primary Care, Psychiatric Medication Management, Supportive Living Environment (group home, assisted house, etc), [facility]/CTSS, Case management  Details of most recent treatment:  Pt has psychiatrist and is generally stable on current meds  Other relevant history:          Collateral Information  Is there collateral information: Yes     Collateral information name, relationship, phone number:  Sanchez  [phone]    What happened today: Pt got angry with staff at gym,     What is different about patient's functioning: no change     Concern about alcohol/drug use:      What do you think the patient needs:      Has patient made comments about wanting to kill themselves/others: yes (Pt sometimes says this when angry or frustrated)    If d/c is recommended, can they take part in safety/aftercare planning:  yes    Additional collateral information:        Risk Assessment  [place] Suicide Severity Rating Scale Full Clinical Version:  Suicidal Ideation  Q1 Wish to be Dead (Lifetime): Yes  Q2 Non-Specific Active Suicidal Thoughts (Lifetime): Yes  3. Active Suicidal Ideation with any Methods (Not Plan) Without Intent to Act (Lifetime): Yes  Q4 Active Suicidal Ideation with Some Intent to Act,  Without Specific Plan (Lifetime): No  Q5 Active Suicidal Ideation with Specific Plan and Intent (Lifetime): No  Q6 Suicide Behavior (Lifetime): no     Suicidal Behavior (Lifetime)  Actual Attempt (Lifetime): No  Has subject engaged in non-suicidal self-injurious behavior? (Lifetime): No  Interrupted Attempts (Lifetime): No  Aborted or Self-Interrupted Attempt (Lifetime): No  Preparatory Acts or Behavior (Lifetime): No    [place] Suicide Severity Rating Scale Recent:              Environmental or Psychosocial Events: other life stressors  Protective Factors: Protective Factors: lives in a responsibly safe and stable environment, good treatment engagement, supportive ongoing medical and mental health care relationships, constructive use of leisure time, enjoyable activities, resilience    Does the patient have thoughts of harming others? Feels Like Hurting Others: no  Previous Attempt to Hurt Others: no  Is the patient engaging in sexually inappropriate behavior?: no    Is the patient engaging in sexually inappropriate behavior?  no        Mental Status Exam   Affect: Appropriate  Appearance: Appropriate  Attention Span/Concentration: Inattentive  Eye Contact: Engaged    Fund of Knowledge: Delayed   Language /Speech Content: Fluent  Language /Speech Volume: Normal  Language /Speech Rate/Productions: Hyperverbal  Recent Memory: Intact  Remote Memory: Intact  Mood: Normal  Orientation to Person: Yes   Orientation to Place: Yes  Orientation to Time of Day: Yes  Orientation to Date: Yes     Situation (Do they understand why they are here?):    Psychomotor Behavior: Normal  Thought Content: Clear  Thought Form: Loose Associations     Mini-Cog Assessment  Number of Words Recalled:    Clock-Drawing Test:     Three Item Recall:    Mini-Cog Total Score:       Medication  Psychotropic medications:   Medication Orders - Psychiatric (From admission, onward)      None             Current Care Team  Patient Care Team:  Kenneth  Jarvis GRIFFITHS DO as PCP - General  Werner Das MD as MD (Pediatrics)  Elton Esparza, FELICITA CNP as Nurse Practitioner (Nurse Practitioner)  Wesly Bowman MD as MD (Urology)  Rosetta Bro, RN as Registered Nurse (Urology)  Leopoldo Gill MD as MD (Dermatology)  Jarvis Cooper DO as Assigned PCP  Jeremiah Engle MD as Assigned Cancer Care Provider    Diagnosis  Patient Active Problem List   Diagnosis Code    Elevated serum creatinine R79.89    Skin cancer screening Z12.83    Multiple melanocytic nevi D22.9    Acute kidney injury (H) N17.9    Acute respiratory failure with hypoxia (H) J96.01    Anticoagulation monitoring, INR range 2-3 Z79.01    Anxiety F41.9    Attention deficit hyperactivity disorder (ADHD), combined type F90.2    Autistic disorder F84.0    Fetal alcohol syndrome Q86.0    History of psychosis Z86.59    Intellectual disability F79    Pulmonary embolism (H) I26.99    Normocytic anemia D64.9    Autism F84.0       Primary Problem This Admission  There are no active hospital problems to display for this patient.      Clinical Summary and Substantiation of Recommendations   Pt preents in ED for SI. Pt acknowledges making statements eariler, but denies current SI. He stated he wanted to talk to someone so he came to ED. Pt lives in  with 24 hour supervision. They are able to take Pt back to [facility] and will follow up with current providers.                          Patient coping skills attempted to reduce the crisis:   staff say they take Pt's suicidal comments seriously. [facility] crisis number and suicide crisis line information provided    Disposition  Recommended disposition: Individual Therapy, Medication Management, Group Home        Reviewed case and recommendations with attending provider. Attending Name: Dr Natalie Perkins       Attending concurs with disposition: yes       Patient and/or validated legal guardian concurs with disposition:           Final disposition:   discharge    Legal status on admission:      Assessment Details   Total duration spent on the patient case in minutes: 20 min     CPT code(s) utilized: 44689 - Psychotherapy for Crisis - 60 (30-74*) min    Jaelyn Padgett LP, Psychotherapist  DEC - Triage & Transition Services  Callback: [phone]

## 2023-08-29 ENCOUNTER — PATIENT OUTREACH (OUTPATIENT)
Dept: CARE COORDINATION | Facility: CLINIC | Age: 23
End: 2023-08-29
Payer: COMMERCIAL

## 2023-08-29 ENCOUNTER — DOCUMENTATION ONLY (OUTPATIENT)
Dept: OTHER | Facility: CLINIC | Age: 23
End: 2023-08-29
Payer: COMMERCIAL

## 2023-08-29 NOTE — PROGRESS NOTES
[facility]    Background: Transitional Care Management program identified per system criteria and reviewed by [facility] team for possible outreach.    Assessment: Upon chart review, [facility] Team member will not proceed with patient outreach related to this episode of Transitional Care Management program due to reason below:    Patient has discharged to a Group Home where patient is receiving on-site support with their daily cares, including support with hospital follow up plan.    Plan: Transitional Care Management episode addressed appropriately per reason noted above.      KATHERINE Ng  [facility]    *Connected Care Resource Team does NOT follow patient ongoing. Referrals are identified based on internal discharge reports and the outreach is to ensure patient has an understanding of their discharge instructions.

## 2023-09-01 ENCOUNTER — HOSPITAL ENCOUNTER (EMERGENCY)
Facility: CLINIC | Age: 23
Discharge: HOME | End: 2023-09-01
Attending: EMERGENCY MEDICINE | Admitting: EMERGENCY MEDICINE
Payer: COMMERCIAL

## 2023-09-01 VITALS
RESPIRATION RATE: 18 BRPM | DIASTOLIC BLOOD PRESSURE: 73 MMHG | OXYGEN SATURATION: 100 % | TEMPERATURE: 97.6 F | HEART RATE: 53 BPM | SYSTOLIC BLOOD PRESSURE: 124 MMHG

## 2023-09-01 DIAGNOSIS — Z91.89 HX OF DRUG OVERDOSE: ICD-10-CM

## 2023-09-01 PROBLEM — R00.0 TACHYCARDIA: Status: ACTIVE | Noted: 2020-06-20

## 2023-09-01 PROBLEM — R45.89 DEPRESSED MOOD: Status: ACTIVE | Noted: 2021-06-21

## 2023-09-01 PROBLEM — Z62.810 HISTORY OF SEXUAL ABUSE IN CHILDHOOD: Status: ACTIVE | Noted: 2022-07-30

## 2023-09-01 LAB
ALBUMIN SERPL BCG-MCNC: 4.2 G/DL (ref 3.5–5.2)
ALP SERPL-CCNC: 72 U/L (ref 40–129)
ALT SERPL W P-5'-P-CCNC: 15 U/L (ref 0–70)
ANION GAP SERPL CALCULATED.3IONS-SCNC: 8 MMOL/L (ref 7–15)
APAP SERPL-MCNC: <5 UG/ML (ref 10–30)
AST SERPL W P-5'-P-CCNC: 16 U/L (ref 0–45)
ATRIAL RATE - MUSE: 76 BPM
BASOPHILS # BLD AUTO: 0.1 10E3/UL (ref 0–0.2)
BASOPHILS NFR BLD AUTO: 1 %
BILIRUB SERPL-MCNC: 0.3 MG/DL
BUN SERPL-MCNC: 11.7 MG/DL (ref 6–20)
CALCIUM SERPL-MCNC: 9.3 MG/DL (ref 8.6–10)
CHLORIDE SERPL-SCNC: 107 MMOL/L (ref 98–107)
CREAT SERPL-MCNC: 1.26 MG/DL (ref 0.67–1.17)
DEPRECATED HCO3 PLAS-SCNC: 23 MMOL/L (ref 22–29)
DIASTOLIC BLOOD PRESSURE - MUSE: NORMAL MMHG
EOSINOPHIL # BLD AUTO: 0.2 10E3/UL (ref 0–0.7)
EOSINOPHIL NFR BLD AUTO: 4 %
ERYTHROCYTE [DISTWIDTH] IN BLOOD BY AUTOMATED COUNT: 12 % (ref 10–15)
ETHANOL SERPL-MCNC: <0.01 G/DL
GFR SERPL CREATININE-BSD FRML MDRD: 82 ML/MIN/1.73M2
GLUCOSE SERPL-MCNC: 116 MG/DL (ref 70–99)
HCT VFR BLD AUTO: 44.4 % (ref 40–53)
HGB BLD-MCNC: 15.5 G/DL (ref 13.3–17.7)
IMM GRANULOCYTES # BLD: 0 10E3/UL
IMM GRANULOCYTES NFR BLD: 0 %
INTERPRETATION ECG - MUSE: NORMAL
LYMPHOCYTES # BLD AUTO: 2.7 10E3/UL (ref 0.8–5.3)
LYMPHOCYTES NFR BLD AUTO: 41 %
MCH RBC QN AUTO: 31.1 PG (ref 26.5–33)
MCHC RBC AUTO-ENTMCNC: 34.9 G/DL (ref 31.5–36.5)
MCV RBC AUTO: 89 FL (ref 78–100)
MONOCYTES # BLD AUTO: 0.4 10E3/UL (ref 0–1.3)
MONOCYTES NFR BLD AUTO: 6 %
NEUTROPHILS # BLD AUTO: 3.2 10E3/UL (ref 1.6–8.3)
NEUTROPHILS NFR BLD AUTO: 48 %
NRBC # BLD AUTO: 0 10E3/UL
NRBC BLD AUTO-RTO: 0 /100
P AXIS - MUSE: 53 DEGREES
PLATELET # BLD AUTO: 318 10E3/UL (ref 150–450)
POTASSIUM SERPL-SCNC: 3.6 MMOL/L (ref 3.4–5.3)
PR INTERVAL - MUSE: 154 MS
PROT SERPL-MCNC: 6.6 G/DL (ref 6.4–8.3)
QRS DURATION - MUSE: 94 MS
QT - MUSE: 366 MS
QTC - MUSE: 411 MS
R AXIS - MUSE: 62 DEGREES
RBC # BLD AUTO: 4.99 10E6/UL (ref 4.4–5.9)
SALICYLATES SERPL-MCNC: <0.3 MG/DL
SODIUM SERPL-SCNC: 138 MMOL/L (ref 136–145)
SYSTOLIC BLOOD PRESSURE - MUSE: NORMAL MMHG
T AXIS - MUSE: 43 DEGREES
VENTRICULAR RATE- MUSE: 76 BPM
WBC # BLD AUTO: 6.5 10E3/UL (ref 4–11)

## 2023-09-01 PROCEDURE — 99284 EMERGENCY DEPT VISIT MOD MDM: CPT

## 2023-09-01 PROCEDURE — 99285 EMERGENCY DEPT VISIT HI MDM: CPT | Mod: 25

## 2023-09-01 PROCEDURE — 80143 DRUG ASSAY ACETAMINOPHEN: CPT | Performed by: EMERGENCY MEDICINE

## 2023-09-01 PROCEDURE — 93005 ELECTROCARDIOGRAM TRACING: CPT

## 2023-09-01 PROCEDURE — 80053 COMPREHEN METABOLIC PANEL: CPT | Performed by: EMERGENCY MEDICINE

## 2023-09-01 PROCEDURE — 90791 PSYCH DIAGNOSTIC EVALUATION: CPT

## 2023-09-01 PROCEDURE — 80179 DRUG ASSAY SALICYLATE: CPT | Performed by: EMERGENCY MEDICINE

## 2023-09-01 PROCEDURE — 36415 COLL VENOUS BLD VENIPUNCTURE: CPT | Performed by: EMERGENCY MEDICINE

## 2023-09-01 PROCEDURE — 82077 ASSAY SPEC XCP UR&BREATH IA: CPT | Performed by: EMERGENCY MEDICINE

## 2023-09-01 PROCEDURE — 85004 AUTOMATED DIFF WBC COUNT: CPT | Performed by: EMERGENCY MEDICINE

## 2023-09-01 ASSESSMENT — ACTIVITIES OF DAILY LIVING (ADL)
ADLS_ACUITY_SCORE: 35
ADLS_ACUITY_SCORE: 35

## 2023-09-01 NOTE — DISCHARGE INSTRUCTIONS
"We are unable to confirm or unconfirmed overuse of Seroquel though your vital signs and presentation and your denial of this we did understand.  Your lab work is unremarkable.  Please discuss further social work issues with your group home attendant or your guardian.    Aftercare Plan  If I am feeling unsafe or I am in a crisis, I will:   Contact my established care providers   Call the [place] Suicide Prevention Lifeline: [phone]   Go to the nearest emergency room   Call 348     Warning signs that I or other people might notice when a crisis is developing for me:     I am having increasing suicidal thoughts that turn to plans with intent or means  I am having additional urges to self-harm    My emotions are of hopelessness; feeling like there's no way out.  Rage or anger.     Things I am able to do on my own to cope or help me feel better:    Staying hydrated  Eating balanced meals  Going for a walk every day  Take care of daily responsibilities/needs  Focus on positive self-talk vs negative self-talk     Things that I am able to do with others to cope or help me better:   Spending quality time with loved ones  Reaching out to friends  TikTok       Things I can use or do for distraction:   Music/sing  Watch \"I Love Florencia\" or other TV/movie  Exercise  Call a friend     Changes I can make to support my mental health and wellness:    -I will attend scheduled mental health therapy and psychiatric appointments and follow all   recommendations  -I will commit to 30 minutes of self care daily - this can be as simple as taking a shower, going for a   walk, cooking a meal, read, writing, etc  -I will practice square breathing when I begin to feel anxious - in breath through the nose for the count   of 4 and the first line on the square. Out breath through the mouth for the count of 4 for the second line   of the square. Repeat to complete the square. Repeat the square as many times as needed.  - I will use distraction " "skills of: going for walks, watching TV, spending time outside, calling a friend or   family member  -Use community resources, including hotline numbers, [facility] crisis and support meetings  -Maintain a daily schedule/routine  -Practice deep breathing skills  -Download a meditation kajal and spend 15-20 minutes per day mediating/relaxing. Some apps to   download include: Calm, Headspace and Insight Timer. All 3 of these apps have free version     People in my life that I can ask for help:   Family  Friends      Your [facility] has a mental health crisis team you can call 24/7: [facility]  [phone]          Crisis Lines  Crisis Text Line  Text 606221  You will be connected with a trained live crisis counselor to provide support.    Mary parson, texto  RAEGAN a 192689 o texto a 442-AYUDAME en WhatsApp    [employer] (LGBTQ Youth Crisis Line)  0.392.582.0295  text START to 085-736      [employer]  Fast Tracker  Linking people to mental health and substance use disorder resources  VisualOn     [place] Mental Health Warm Line  Peer to peer support  Monday thru Saturday, 12 pm to 10 pm  942.096.3768 or 9.783.272.4412  Text \"Support\" to 03987    National [place] on Mental Illness (JESSICA)  332.188.0820 or 1.888.JESSICA.HELPS      Mental Health Apps  My3  https://myDNA SEQpp.org/    VirtualHopeBox  https://Waremakers.org/apps/virtual-hope-box/      Crisis Lines  Crisis Text Line  Text 724549  You will be connected with a trained live crisis counselor to provide support.    Mary parson, texto  RAEGAN a 825403 o texto a 442-AYUDAME en WhatsApp    National Hope Line  1.800.SUICIDE [1730105]      Community Ad Infuse  Fast Tracker  Linking people to mental health and substance use disorder resources  VisualOn     [place] Mental Health Warm Line  Peer to peer support  Monday thru Saturday, 12 pm to 10 pm  286.273.3790 or 4.738.139.7457  Text \"Support\" to 45066    National " [place] on Mental Illness (JESSICA)  404.405.2349 or 1.888.JESSICA.HELPS      Mental Health Apps  My3  https://myMAG Interactivepp.org/    VirtualHopeBox  https://Senic/apps/virtual-hope-box/      Additional Information  Today you were seen by a licensed mental health professional through Triage and Transition services, [facility] (P)  for a crisis assessment in the Emergency Department at [facility].  It is recommended that you follow up with your established providers (psychiatrist, mental health therapist, and/or primary care doctor - as relevant) as soon as possible. Coordinators from [facility] will be calling you in the next 24-48 hours to ensure that you have the resources you need.  You can also contact [facility] coordinators directly at [phone]. You may have been scheduled for or offered an appointment with a mental health provider. [facility] maintains an extensive network of licensed behavioral health providers to connect patients with the services they need.  We do not charge providers a fee to participate in our referral network.  We match patients with providers based on a patient's specific needs, insurance coverage, and location.  Our first effort will be to refer you to a provider within your care system, and will utilize providers outside your care system as needed.

## 2023-09-01 NOTE — ED TRIAGE NOTES
"Patient is from a group home/apartment living. Staff reports that patient had taken 20 tablets of seroquel. Patient denies and states he only took 3 which is the amount he can take. Patient states \"I'm not that stupid, I do have staff that forget to sign the pack\". Patient is awake, lucid and speaking fine. Blister pack arrived with patient. Patient requesting male provider.         "

## 2023-09-01 NOTE — ED PROVIDER NOTES
History     Chief Complaint:  Ingestion       HPI   Octavio Kirkpatrick is a 22 year old male who presents with possible overdose.  Patient is a 22-year-old male who has a guardian.  Patient lives in a apartment with monitoring.  Patient apparently asked for his as needed anxiety medications may have taken more than he was supposed to patient denies this and states he only took 3 staff and where he lives states may be more there is some pills missing EMS was called was placed on a police hold and sent to the emergency room for assessment.      Independent Historian:   None - Patient Only    Review of External Notes:   Visit to the ER for suicidal thoughts.      Medications:    apixaban ANTICOAGULANT (ELIQUIS) 5 MG tablet  Fesoterodine Fumarate 8 MG TB24  fluticasone (FLONASE) 50 MCG/ACT nasal spray  gabapentin (NEURONTIN) 100 MG capsule  hydrOXYzine (ATARAX) 25 MG tablet  lisinopril (ZESTRIL) 5 MG tablet  methylphenidate (CONCERTA) 36 MG CR tablet  oxybutynin ER (DITROPAN XL) 10 MG 24 hr tablet  QUEtiapine (SEROQUEL) 100 MG tablet  Sertraline HCl (ZOLOFT PO)  terbinafine (LAMISIL) 1 % external cream  tolterodine ER (DETROL LA) 4 MG 24 hr capsule  UNABLE TO FIND  VITAMIN D, CHOLECALCIFEROL, PO        Past Medical History:    Past Medical History:   Diagnosis Date    ADHD     Anxiety     Autistic disorder     Heart murmur     Hernia, abdominal     History of thrombophlebitis     Palpitations     Prostate infection        Past Surgical History:    Past Surgical History:   Procedure Laterality Date    ENT SURGERY      tonsillectomy    HERNIA REPAIR      PE TUBES      TONSILLECTOMY & ADENOIDECTOMY      wisdom teeth          Physical Exam   Patient Vitals for the past 24 hrs:   BP Temp Temp src Pulse Resp SpO2   09/01/23 0129 (!) 143/86 97.6  F (36.4  C) Oral 53 16 95 %        Physical Exam  Vitals reviewed.   HENT:      Mouth/Throat:      Mouth: Mucous membranes are moist.   Cardiovascular:      Rate and Rhythm: Normal  rate.   Pulmonary:      Effort: Pulmonary effort is normal.   Abdominal:      General: Abdomen is flat.   Musculoskeletal:         General: Normal range of motion.   Skin:     General: Skin is warm.      Capillary Refill: Capillary refill takes less than 2 seconds.   Neurological:      General: No focal deficit present.      Mental Status: He is alert and oriented to person, place, and time.   Psychiatric:         Mood and Affect: Mood normal.      Comments: He is frustrated with his living environment.  Denies taking overdose.  Denies suicidality.         Emergency Department Course   ECG  ECG taken at 319, ECG read at 325  Rate 76 bpm. IN interval 154 ms. QRS duration 94 ms. QT/QTc 366/411 ms. normal sinus rhythm no ST or T wave abnormalities.      Laboratory:  Labs Ordered and Resulted from Time of ED Arrival to Time of ED Departure   COMPREHENSIVE METABOLIC PANEL - Abnormal       Result Value    Sodium 138      Potassium 3.6      Chloride 107      Carbon Dioxide (CO2) 23      Anion Gap 8      Urea Nitrogen 11.7      Creatinine 1.26 (*)     Calcium 9.3      Glucose 116 (*)     Alkaline Phosphatase 72      AST 16      ALT 15      Protein Total 6.6      Albumin 4.2      Bilirubin Total 0.3      GFR Estimate 82     ACETAMINOPHEN LEVEL - Abnormal    Acetaminophen <5.0 (*)    ETHYL ALCOHOL LEVEL - Normal    Alcohol ethyl <0.01     SALICYLATE LEVEL - Normal    Salicylate <0.3     CBC WITH PLATELETS AND DIFFERENTIAL    WBC Count 6.5      RBC Count 4.99      Hemoglobin 15.5      Hematocrit 44.4      MCV 89      MCH 31.1      MCHC 34.9      RDW 12.0      Platelet Count 318      % Neutrophils 48      % Lymphocytes 41      % Monocytes 6      % Eosinophils 4      % Basophils 1      % Immature Granulocytes 0      NRBCs per 100 WBC 0      Absolute Neutrophils 3.2      Absolute Lymphocytes 2.7      Absolute Monocytes 0.4      Absolute Eosinophils 0.2      Absolute Basophils 0.1      Absolute Immature Granulocytes 0.0       Absolute NRBCs 0.0          Emergency Department Course & Assessments:             Interventions:  Medications - No data to display     Assessments:      Independent Interpretation (X-rays, CTs, rhythm strip):  None    Consultations/Discussion of Management or Tests:  None        Social Determinants of Health affecting care:   None    Disposition:  The patient was discharged to home.     Impression & Plan      Medical Decision Making:  Patient presents with a question of possible overdose.  Patient was administering some pills from his blister pack.  Patient denies overdosing but took 3 of his scheduled medications.  This became a he said she said situation is discussed with the  who feels there is some pills missing.  Care was discussed with toxicology who recommended a period of observation.  Vitals remained stable.  Due to recent suicidality to including a DEC assessment was done which was normal patient continued to deny suicidality.  I have nothing to hold the patient against his will.  His living environment seems somewhat strange.  I am unable to change this at this hour.  Patient is seems frustrated with it.  Patient has a court appointed guardian but at this time feels safe for discharge back home.  Patient has been observed for Seroquel overdose and shows no signs of tachycardia anticholinergic syndrome or other at the physical exam findings for Seroquel overdose and was discharged back to home in stable condition.    Critical Care time:  was 0 minutes for this patient excluding procedures.    Diagnosis:    ICD-10-CM    1. Hx of drug overdose  Z91.89            Discharge Medications:  New Prescriptions    No medications on file          Jaspreet Girard MD  9/1/2023   Jaspreet Girard MD Goodman, Brian Samuel, MD  09/01/23 6810

## 2023-09-01 NOTE — ED NOTES
Group home staff member [place] notified pt is ready for discharge.  Staff coming to hospital to  pt.
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
## 2023-09-05 ENCOUNTER — TELEPHONE (OUTPATIENT)
Dept: HEMATOLOGY | Facility: CLINIC | Age: 23
End: 2023-09-05
Payer: COMMERCIAL

## 2023-09-10 ENCOUNTER — HOSPITAL ENCOUNTER (EMERGENCY)
Facility: CLINIC | Age: 23
Discharge: HOME | End: 2023-09-10
Attending: EMERGENCY MEDICINE | Admitting: EMERGENCY MEDICINE
Payer: COMMERCIAL

## 2023-09-10 VITALS
TEMPERATURE: 97.6 F | RESPIRATION RATE: 18 BRPM | SYSTOLIC BLOOD PRESSURE: 135 MMHG | HEART RATE: 78 BPM | DIASTOLIC BLOOD PRESSURE: 88 MMHG | OXYGEN SATURATION: 97 %

## 2023-09-10 DIAGNOSIS — R45.851 SUICIDAL THOUGHTS: ICD-10-CM

## 2023-09-10 PROCEDURE — 99285 EMERGENCY DEPT VISIT HI MDM: CPT | Mod: 25

## 2023-09-10 PROCEDURE — 90791 PSYCH DIAGNOSTIC EVALUATION: CPT

## 2023-09-10 ASSESSMENT — ACTIVITIES OF DAILY LIVING (ADL): ADLS_ACUITY_SCORE: 35

## 2023-09-11 ENCOUNTER — DOCUMENTATION ONLY (OUTPATIENT)
Dept: OTHER | Facility: CLINIC | Age: 23
End: 2023-09-11
Payer: COMMERCIAL

## 2023-09-11 NOTE — DISCHARGE INSTRUCTIONS
APPOINTMENTS:    Weekly meetings with [facility] worker and Behavior Analyst @ [facility]    Aftercare Plan  If I am feeling unsafe or I am in a crisis, I will:   Contact my established care providers   Call the National Suicide Prevention Lifeline: 988  Go to the nearest emergency room   Call 911     Warning signs that I or other people might notice when a crisis is developing for me:  making threats to harm myself, sending text messages that I'm having suicidal thoughts and want to kill myself. Feeling anxious, being restless    Things I am able to do on my own to cope or help me feel better: Singing, listening to music.  OTHER:  Take a deep breath and sit down if needed.    Think before acting. -I can try practicing square breathing when I begin to feel anxious - inhale through the nose for the count of 4 and the first line on the square.    Exhale through the mouth for the count of 4 for the second line of the square. Repeat to complete the square. Repeat the square as many times as needed. -I can also use my five senses to practice mindfulness and grounding.    What are five things I can see, four things I can hear, three things I can feel, two things I can smell, and one thing I can taste. -     Download a meditation kajal and spend 15-20 minutes per day mediating/relaxing. Some apps to download include Calm, Headspace, and Insight Timer. All of these apps have free version.      Things that I am able to do with others to cope or help me better: Commit to 30 minutes of self care daily. This can be as simple as taking a shower, going for a walk, cooking a meal, reading, writing, etc. -I can also use community resources including mental health hotlines, [facility] crisis teams, or apps.       Things I can use or do for distraction: Cooking food, signing, music,Call a friend or family member. -Spend time outside.      Go for a walk. -Exercise -Do chores. -Do a project or favorite activity. -Listen to music. -Read.  "-Journal your feelings. -I can also download a meditation or relaxation kajal, like Calm, Headspace, or Insight Timer (all three offer a free version). -A great website resource is Change to Chill with active coping skills.       Changes I can make to support my mental health and wellness: Talk to staff, including my [facility] worker and Behavior analyst. Take my medications including my PRN regularly     People in my life that I can ask for help: My grand parents     Your [facility] has a mental health crisis team you can call 24/7: [facility] Crisis  [phone]    Other things that are important when I'm in crisis: Remember that the feelings I am having right now are temporary and it won't feel like this forever. It is okay and important to ask for help.       Additional resources and information: See below        Crisis Lines  Crisis Text Line  Text 762052  You will be connected with a trained live crisis counselor to provide support.    Por marshalanol, texto  RAEGAN a 749609 o texto a 442-AYUDAME en WhatsApp    [employer] (LGBTQ Youth Crisis Line)  8.646.822.6680  text START to 101-815      Community Resources  Fast Tracker  Linking people to mental health and substance use disorder resources  fasttrackShopnlistn.org     [place] Mental Health Warm Line  Peer to peer support  Monday thru Saturday, 12 pm to 10 pm  495.600.9644 or 3.569.771.8654  Text \"Support\" to 14169    National [place] on Mental Illness (JESSICA)  117.573.9578 or 1.888.JESSICA.HELPS      Mental Health Apps  My3  https://my3app.org/    VirtualHopeBox  https://[employer].org/apps/virtual-hope-box/      Additional Information  Today you were seen by a licensed mental health professional through Triage and Transition services, [facility] ([facility])  for a crisis assessment in the Emergency Department at [facility].  It is recommended that you follow up with your established providers (psychiatrist, mental health " therapist, and/or primary care doctor - as relevant) as soon as possible. Coordinators from [facility] will be calling you in the next 24-48 hours to ensure that you have the resources you need.  You can also contact [facility] coordinators directly at [phone]. You may have been scheduled for or offered an appointment with a mental health provider. [facility] maintains an extensive network of licensed behavioral health providers to connect patients with the services they need.  We do not charge providers a fee to participate in our referral network.  We match patients with providers based on a patient's specific needs, insurance coverage, and location.  Our first effort will be to refer you to a provider within your care system, and will utilize providers outside your care system as needed.

## 2023-09-11 NOTE — ED PROVIDER NOTES
History     Chief Complaint:  Suicidal       HPI   Octavio Kirkpatrick is a 22 year old anticoagulated male with a history of anxiety, VTE, ADHD, depression, and autism who presents with suicidal ideation. He planned to jump off of a bridge today after being bullied on social media, but states he knew he would not do this because it would let his bullies win. He lives in an apartment with 24 hr PCA staff. He currently denies suicidal ideation. He has been taking his Eliquis as prescribed. He denies fever, cough, vomiting, or diarrhea.  He texted a friend today about his suicidal thoughts and the police found him and placed him on a hold.     Independent Historian:   None - Patient Only    Review of External Notes:    None    Medications:    Eliquis   Flonase   Gabapentin   Atarax   Zestril   Concerta   Ditropan   Seroquel   Zoloft   Detrol      Past Medical History:    ADHD   Anxiety  Autistic disorder   Heart murmur   Hernia   Thrombophlebitis   Palpitations   Prostate infection   Fetal alcohol syndrome   Psychosis   PE  Intellectual disability      Past Surgical History:    Tonsillectomy   Hernia repair   PE tube insertion   T & A   [place] teeth extraction      Physical Exam   Patient Vitals for the past 24 hrs:   BP Temp Pulse Resp SpO2   09/10/23 1950 135/88 97.6  F (36.4  C) 78 18 97 %        Physical Exam  Vitals and nursing note reviewed.   Constitutional:       General: He is not in acute distress.     Appearance: Normal appearance. He is not ill-appearing.   HENT:      Head: Normocephalic and atraumatic.      Right Ear: External ear normal.      Left Ear: External ear normal.      Nose: Nose normal.   Eyes:      Conjunctiva/sclera: Conjunctivae normal.   Cardiovascular:      Rate and Rhythm: Normal rate and regular rhythm.      Heart sounds: No murmur heard.  Pulmonary:      Effort: Pulmonary effort is normal. No respiratory distress.      Breath sounds: No wheezing, rhonchi or rales.   Abdominal:       General: Abdomen is flat. There is no distension.      Palpations: Abdomen is soft.      Tenderness: There is no abdominal tenderness. There is no guarding or rebound.   Musculoskeletal:         General: No swelling or deformity.      Cervical back: Normal range of motion and neck supple.   Skin:     General: Skin is warm and dry.      Findings: No rash.   Neurological:      Mental Status: He is alert and oriented to person, place, and time.   Psychiatric:         Mood and Affect: Mood normal.         Speech: Speech is rapid and pressured.         Behavior: Behavior is hyperactive.         Thought Content: Thought content does not include suicidal ideation. Thought content does not include suicidal plan.           Emergency Department Course     PSS-3      Date and Time Over the past 2 weeks have you felt down, depressed, or hopeless? Over the past 2 weeks have you had thoughts of killing yourself? Have you ever attempted to kill yourself? When did this last happen? User   09/10/23 1954 yes yes no -- AV          C-SSRS ([place])      Date and Time Q1 Wished to be Dead (Past Month) Q2 Suicidal Thoughts (Past Month) Q3 Suicidal Thought Method Q4 Suicidal Intent without Specific Plan Q5 Suicide Intent with Specific Plan Q6 Suicide Behavior (Lifetime) Within the Past 3 Months? RETIRED: Level of Risk per Screen Screening Not Complete User   09/10/23 1954 yes yes yes no yes no -- -- -- AV              Suicide assessment completed by mental health (D.E.C., LCSW, etc.)    Interventions:  Medications - No data to display     Assessments:  2019 I obtained history and examined the patient, as noted above.    Independent Interpretation (X-rays, CTs, rhythm strip):  None    Consultations/Discussion of Management or Tests:  2235 discussed with the DEC  who has assessed the patient.         Social Determinants of Health affecting care:   Stress/Adjustment Disorders    Disposition:  The patient was discharged to  home.    Impression & Plan      Medical Decision Makin-year-old male presenting with suicidal thoughts.  According to the police hold, patient expressed thoughts of wanting to jump off a bridge and commit suicide.  Patient says that he texted a friend with these thoughts due to some bullying that he has been going through on social media.  He says that he did not mean it and does not actually want to hurt himself and does not think that he could go through with that.  We will have DEC assess the patient regarding further mental health care.  There does not appear to be any underlying medical organic causes of the patient's symptoms or behavior today.  Do not feel that lab work is indicated unless the patient needs to be transferred and this is required by the accepting facility.    2232 I discussed with the DEC  who assessed the patient.  She feels that the patient is safe to go home.  Patient is no longer suicidal and staff at work the patient feels that this was an attention seeking behavior.  They feel that he is low risk for attempting suicide at this point and encourage us to discharge the patient.      Diagnosis:    ICD-10-CM    1. Suicidal thoughts  R45.851            Discharge Medications:  New Prescriptions    No medications on file        Scribe Disclosure:  I, Samuel Mauro, am serving as a scribe at 8:12 PM on 9/10/2023 to document services personally performed by Fran Zapien MD based on my observations and the provider's statements to me.   9/10/2023   Fran Zapien MD Goodwin, Shaun M, MD  09/10/23 9264

## 2023-09-11 NOTE — ED TRIAGE NOTES
Pt arrives on [place] by PD d/t SI with plan to jump off bridge. Pt found walking by PD with pt's PCA. Pt states he's been bullied on social media and wants to die. Pt lives in apartment with 24 hr PCA staff. Pt calm and cooperative in ED. Phone, shoes and sunglasses removed from pt. Searched by security. ABC intact.
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
## 2023-10-26 ENCOUNTER — HOSPITAL ENCOUNTER (EMERGENCY)
Facility: CLINIC | Age: 23
Discharge: HOME | End: 2023-10-26
Attending: EMERGENCY MEDICINE | Admitting: EMERGENCY MEDICINE
Payer: COMMERCIAL

## 2023-10-26 VITALS
OXYGEN SATURATION: 99 % | SYSTOLIC BLOOD PRESSURE: 131 MMHG | RESPIRATION RATE: 14 BRPM | DIASTOLIC BLOOD PRESSURE: 79 MMHG | TEMPERATURE: 98.9 F | HEART RATE: 80 BPM

## 2023-10-26 DIAGNOSIS — R46.89 AGGRESSIVE BEHAVIOR: Primary | ICD-10-CM

## 2023-10-26 PROCEDURE — 99283 EMERGENCY DEPT VISIT LOW MDM: CPT

## 2023-10-26 ASSESSMENT — ENCOUNTER SYMPTOMS
COUGH: 0
ABDOMINAL PAIN: 0
DYSURIA: 0
NECK PAIN: 0
DIZZINESS: 0
CHILLS: 0
SHORTNESS OF BREATH: 0
HEADACHES: 0
NAUSEA: 0
HEMATURIA: 0
BACK PAIN: 0
CONSTIPATION: 1
VOMITING: 0
FEVER: 0
RHINORRHEA: 0

## 2023-10-26 ASSESSMENT — ACTIVITIES OF DAILY LIVING (ADL)
ADLS_ACUITY_SCORE: 35
ADLS_ACUITY_SCORE: 35
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
## 2023-10-26 NOTE — ED NOTES
Bed: [facility]  Expected date:   Expected time:   Means of arrival:   Comments:  EMS 23 M
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
## 2023-10-26 NOTE — CARE PLAN
Octavio Kirkpatrick  October 26, 2023  Plan of Care Hand-off Note     Patient Care Path: (P) discharge back to group home    Plan for Care:   (P) Patient was transported to ED and has stabilized.  He no longer presents as a danger to self or others.  He participated in safety aftercare plan.   Group Home staff are expecting patient to return to facilility.  They do not have transportation to get pateint from ED but will be waiting for patient there.   Patient agrees to stay consistent with care plan and talk with various providers about how an event like tonight may be prevented or minimized in future.  Patient has providers and full time group home staff personal at his current placement.  Patient feels safe returning as well    Identified Goals and Safety Issues: (P) stabilize and safely return to group home    Overview:  (P) Sanchez   (P) Yulia, group home staff, overnights        Updated   regarding plan of care.           Gabby Finley, [facility] HospitalSW

## 2023-11-02 ENCOUNTER — TELEPHONE (OUTPATIENT)
Dept: HEMATOLOGY | Facility: CLINIC | Age: 23
End: 2023-11-02
Payer: COMMERCIAL

## 2023-12-01 DIAGNOSIS — I26.02 ACUTE SADDLE PULMONARY EMBOLISM WITH ACUTE COR PULMONALE (H): ICD-10-CM

## 2023-12-01 NOTE — PROGRESS NOTES
Refill request from [facility] for this patient's Eliquis 5mg. Patient is due for follow up. Will provide refills and send message to  to reach out to schedule.    Leslie KAMARAN, RN, PHN   [facility]   Office: [phone]  Fax: [phone]

## 2024-01-04 ENCOUNTER — PATIENT OUTREACH (OUTPATIENT)
Dept: CARE COORDINATION | Facility: CLINIC | Age: 24
End: 2024-01-04
Payer: COMMERCIAL

## 2024-01-30 DIAGNOSIS — I10 ESSENTIAL HYPERTENSION: ICD-10-CM

## 2024-01-31 RX ORDER — LISINOPRIL 5 MG/1
5 TABLET ORAL DAILY
Qty: 90 TABLET | Refills: 0 | Status: SHIPPED | OUTPATIENT
Start: 2024-01-31 | End: 2024-03-14

## 2024-02-01 NOTE — TELEPHONE ENCOUNTER
90 day refill signed. Due for preventive visit, med check, and fasting labs. Please help schedule office visit.     Jarvis Cooper,   1/31/2024 8:36 PM

## 2024-02-08 NOTE — TELEPHONE ENCOUNTER
Attempt #1    LM w/pt to call and set up fasting appt with med check and follow up.    Kadie MUJICA

## 2024-02-15 ENCOUNTER — TELEPHONE (OUTPATIENT)
Dept: FAMILY MEDICINE | Facility: CLINIC | Age: 24
End: 2024-02-15
Payer: COMMERCIAL

## 2024-02-15 NOTE — TELEPHONE ENCOUNTER
Reason for Call:  Appointment Request    Patient requesting this type of appt:  Preventive     Requested provider:  Open to new provider that is closer to living quarters    Reason patient unable to be scheduled: Not within requested timeframe    When does patient want to be seen/preferred time:  ASAP    Comments: Needing to be seen ASAP for this visit due to needing a med filled as well. Would need to be Male for Muslim reasons stated by pt, would prefer an afternoon appt after 3:30p, group home staff arrive at this time.    Could we send this information to you in [employer] or would you prefer to receive a phone call?:   Patient would like to be contacted via [employer]    Call taken on 2/15/2024 at 4:48 PM by GABI TORO

## 2024-02-15 NOTE — TELEPHONE ENCOUNTER
Sent [facility] message requesting a call back for an appt. Two more attempts will be made.     Elisa Bonilla

## 2024-02-16 NOTE — TELEPHONE ENCOUNTER
Not certain why this Patient was routed to the [facility].  [facility] currently has three Male Providers. Dr. Chaudhry is not taking new Patients. Dr. Peres and I[facility] do not have any openings until mid to late April.

## 2024-02-22 DIAGNOSIS — I26.02 ACUTE SADDLE PULMONARY EMBOLISM WITH ACUTE COR PULMONALE (H): ICD-10-CM

## 2024-02-22 NOTE — PROGRESS NOTES
[facility] of [facility]TE.     They were last evaluated by our team on 11/9/22 with the plan to remain on long term anticoagulation and return to clinic in 1 year.    Prescription updated and refills given x 30 days with 1 refill.    This message will be routed to  for scheduling.   Raven Rene, MSN, RN, PHN -Nurse Clinician, [facility] [phone]

## 2024-02-22 NOTE — TELEPHONE ENCOUNTER
Patient is scheduled for earliest appointment with a male provider.     Lucrecia Cope  Lead   [facility]

## 2024-03-04 ENCOUNTER — TELEPHONE (OUTPATIENT)
Dept: HEMATOLOGY | Facility: CLINIC | Age: 24
End: 2024-03-04
Payer: COMMERCIAL

## 2024-03-07 ENCOUNTER — TELEPHONE (OUTPATIENT)
Dept: HEMATOLOGY | Facility: CLINIC | Age: 24
End: 2024-03-07
Payer: COMMERCIAL

## 2024-03-14 ENCOUNTER — OFFICE VISIT (OUTPATIENT)
Dept: FAMILY MEDICINE | Facility: CLINIC | Age: 24
End: 2024-03-14
Payer: COMMERCIAL

## 2024-03-14 VITALS
TEMPERATURE: 97.9 F | DIASTOLIC BLOOD PRESSURE: 89 MMHG | WEIGHT: 185.5 LBS | RESPIRATION RATE: 16 BRPM | OXYGEN SATURATION: 100 % | SYSTOLIC BLOOD PRESSURE: 127 MMHG | BODY MASS INDEX: 25.12 KG/M2 | HEART RATE: 86 BPM | HEIGHT: 72 IN

## 2024-03-14 DIAGNOSIS — F41.1 GENERALIZED ANXIETY DISORDER: ICD-10-CM

## 2024-03-14 DIAGNOSIS — Z00.00 ROUTINE GENERAL MEDICAL EXAMINATION AT A HEALTH CARE FACILITY: Primary | ICD-10-CM

## 2024-03-14 DIAGNOSIS — F84.0 AUTISTIC DISORDER: ICD-10-CM

## 2024-03-14 DIAGNOSIS — I10 ESSENTIAL HYPERTENSION: ICD-10-CM

## 2024-03-14 DIAGNOSIS — Z86.711 HISTORY OF PULMONARY EMBOLISM: ICD-10-CM

## 2024-03-14 DIAGNOSIS — Z86.59 HISTORY OF PSYCHOSIS: ICD-10-CM

## 2024-03-14 PROBLEM — J96.01 ACUTE RESPIRATORY FAILURE WITH HYPOXIA (H): Status: RESOLVED | Noted: 2020-06-15 | Resolved: 2024-03-14

## 2024-03-14 PROBLEM — I26.99 PULMONARY EMBOLISM (H): Status: RESOLVED | Noted: 2020-06-20 | Resolved: 2024-03-14

## 2024-03-14 PROCEDURE — 82306 VITAMIN D 25 HYDROXY: CPT | Performed by: PHYSICIAN ASSISTANT

## 2024-03-14 PROCEDURE — 36415 COLL VENOUS BLD VENIPUNCTURE: CPT | Performed by: PHYSICIAN ASSISTANT

## 2024-03-14 PROCEDURE — 99395 PREV VISIT EST AGE 18-39: CPT | Performed by: PHYSICIAN ASSISTANT

## 2024-03-14 RX ORDER — LISINOPRIL 5 MG/1
5 TABLET ORAL DAILY
Qty: 90 TABLET | Refills: 2 | Status: SHIPPED | OUTPATIENT
Start: 2024-03-14

## 2024-03-14 SDOH — HEALTH STABILITY: PHYSICAL HEALTH: ON AVERAGE, HOW MANY MINUTES DO YOU ENGAGE IN EXERCISE AT THIS LEVEL?: 90 MIN

## 2024-03-14 SDOH — HEALTH STABILITY: PHYSICAL HEALTH: ON AVERAGE, HOW MANY DAYS PER WEEK DO YOU ENGAGE IN MODERATE TO STRENUOUS EXERCISE (LIKE A BRISK WALK)?: 3 DAYS

## 2024-03-14 ASSESSMENT — SOCIAL DETERMINANTS OF HEALTH (SDOH): HOW OFTEN DO YOU GET TOGETHER WITH FRIENDS OR RELATIVES?: TWICE A WEEK

## 2024-03-14 NOTE — PROGRESS NOTES
"Preventive Care Visit  [facility] [place] JESIKA Spence PA-C, Family Medicine  Mar 14, 2024      Assessment & Plan     Routine general medical examination at a health care facility  Reviewed personal and family history. Reviewed age appropriate screenings. Recommended any needed vaccinations. He declines  - Vitamin D Deficiency; Future  - Vitamin D Deficiency    Essential hypertension  Controlled. Continue present management.   - lisinopril (ZESTRIL) 5 MG tablet; Take 1 tablet (5 mg) by mouth daily    History of pulmonary embolism  He sees hematology. Continues on DOAC    History of psychosis  Generalized anxiety disorder  Autistic disorder  Follows with outside psych        BMI  Estimated body mass index is 25.51 kg/m  as calculated from the following:    Height as of this encounter: 1.816 m (5' 11.5\").    Weight as of this encounter: 84.1 kg (185 lb 8 oz).       Counseling  Appropriate preventive services were discussed with this patient, including applicable screening as appropriate for fall prevention, nutrition, physical activity, Tobacco-use cessation, weight loss and cognition.  Checklist reviewing preventive services available has been given to the patient.  Reviewed patient's diet, addressing concerns and/or questions.   He is at risk for lack of exercise and has been provided with information to increase physical activity for the benefit of his well-being.   He is at risk for psychosocial distress and has been provided with information to reduce risk.         Wilmer Romero is a 23 year old, presenting for the following:  Physical        3/14/2024     4:27 PM   Additional Questions   Roomed by Elton THIBODEAUX   Accompanied by no one         3/14/2024     4:27 PM   Patient Reported Additional Medications   Patient reports taking the following new medications none        Health Care Directive  Patient has a Health Care Directive on file      Healthy Habits:     Getting at least 3 " servings of Calcium per day:  Yes    Bi-annual eye exam:  Yes    Dental care twice a year:  Yes    Sleep apnea or symptoms of sleep apnea:  None    Diet:  Regular (no restrictions)    Frequency of exercise:  2-3 days/week    Duration of exercise:  45-60 minutes    Barriers to taking medications:  None    Medication side effects:  None    Additional concerns today:  No    Octavio Kirkpatrick is a 23 year old male who presents today for annual check up and med check  He has no concerns today; he has a primary he follows with but could not get in with him this time  Follows with psych, urology and hematology        3/14/2024   General Health   How would you rate your overall physical health? Excellent   Feel stress (tense, anxious, or unable to sleep) Only a little   (!) STRESS CONCERN      3/14/2024   Nutrition   Three or more servings of calcium each day? Yes   Diet: Regular (no restrictions)   How many servings of fruit and vegetables per day? (!) 2-3   How many sweetened beverages each day? 0-1         3/14/2024   Exercise   Days per week of moderate/strenous exercise 3 days   Average minutes spent exercising at this level 90 min         3/14/2024   Social Factors   Frequency of gathering with friends or relatives Twice a week   Worry food won't last until get money to buy more No   Food not last or not have enough money for food? No   Do you have housing?  Yes   Are you worried about losing your housing? No   Lack of transportation? No   Unable to get utilities (heat,electricity)? No         3/14/2024   Dental   Dentist two times every year? Yes         3/14/2024   TB Screening   Were you born outside of [place]?  (!) YES           Today's PHQ-2 Score:       3/14/2024     8:39 AM   PHQ-2 ( 1999 [employer])   Q1: Little interest or pleasure in doing things 0   Q2: Feeling down, depressed or hopeless 1   PHQ-2 Score 1   Q1: Little interest or pleasure in doing things Not at all   Q2: Feeling down, depressed or hopeless Several  "days   PHQ-2 Score 1           3/14/2024   Substance Use   Alcohol more than 3/day or more than 7/wk No   Do you use any other substances recreationally? No     Social History     Tobacco Use    Smoking status: Never    Smokeless tobacco: Never   Vaping Use    Vaping Use: Never used   Substance Use Topics    Alcohol use: Yes     Comment: very rare    Drug use: Not Currently           3/14/2024   STI Screening   New sexual partner(s) since last STI/HIV test? No         3/14/2024   Contraception/Family Planning   Questions about contraception or family planning No        Reviewed and updated as needed this visit by Provider                          Review of Systems  Constitutional, HEENT, cardiovascular, pulmonary, gi and gu systems are negative, except as otherwise noted.     Objective    Exam  /89 (BP Location: Right arm, Patient Position: Sitting, Cuff Size: Adult Regular)   Pulse 86   Temp 97.9  F (36.6  C) (Oral)   Resp 16   Ht 1.816 m (5' 11.5\")   Wt 84.1 kg (185 lb 8 oz)   SpO2 100%   BMI 25.51 kg/m     Estimated body mass index is 25.51 kg/m  as calculated from the following:    Height as of this encounter: 1.816 m (5' 11.5\").    Weight as of this encounter: 84.1 kg (185 lb 8 oz).    Physical Exam  GENERAL: alert and no distress  EYES: Eyes grossly normal to inspection, PERRL and conjunctivae and sclerae normal  HENT: ear canals and TM's normal, nose and mouth without ulcers or lesions  RESP: lungs clear to auscultation - no rales, rhonchi or wheezes  CV: regular rate and rhythm, normal S1 S2, no S3 or S4, no murmur, click or rub, no peripheral edema  ABDOMEN: soft, nontender, no hepatosplenomegaly, no masses and bowel sounds normal  MS: no gross musculoskeletal defects noted, no edema  SKIN: no suspicious lesions or rashes  PSYCH: affect normal/bright        Signed Electronically by: Danny Spence PA-C    "

## 2024-03-14 NOTE — PATIENT INSTRUCTIONS
Please think about the HPV vaccine series!    Preventive Care Advice   This is general advice given by our system to help you stay healthy. However, your care team may have specific advice just for you. Please talk to your care team about your preventive care needs.  Nutrition  Eat 5 or more servings of fruits and vegetables each day.  Try wheat bread, brown rice and whole grain pasta (instead of white bread, rice, and pasta).  Get enough calcium and vitamin D. Check the label on foods and aim for 100% of the RDA (recommended daily allowance).  Lifestyle  Exercise at least 150 minutes each week   (30 minutes a day, 5 days a week).  Do muscle strengthening activities 2 days a week. These help control your weight and prevent disease.  No smoking.  Wear sunscreen to prevent skin cancer.  Have a dental exam and cleaning every 6 months.  Yearly exams  See your health care team every year to talk about:  Any changes in your health.  Any medicines your care team has prescribed.  Preventive care, family planning, and ways to prevent chronic diseases.  Shots (vaccines)   HPV shots (up to age 26), if you've never had them before.  Hepatitis B shots (up to age 59), if you've never had them before.  COVID-19 shot: Get this shot when it's due.  Flu shot: Get a flu shot every year.  Tetanus shot: Get a tetanus shot every 10 years.  Pneumococcal, hepatitis A, and RSV shots: Ask your care team if you need these based on your risk.  Shingles shot (for age 50 and up).  General health tests  Diabetes screening:  Starting at age 35, Get screened for diabetes at least every 3 years.  If you are younger than age 35, ask your care team if you should be screened for diabetes.  Cholesterol test: At age 39, start having a cholesterol test every 5 years, or more often if advised.  Bone density scan (DEXA): At age 50, ask your care team if you should have this scan for osteoporosis (brittle bones).  Hepatitis C: Get tested at least once in  your life.  STIs (sexually transmitted infections)  Before age 24: Ask your care team if you should be screened for STIs.  After age 24: Get screened for STIs if you're at risk. You are at risk for STIs (including HIV) if:  You are sexually active with more than one person.  You don't use condoms every time.  You or a partner was diagnosed with a sexually transmitted infection.  If you are at risk for HIV, ask about PrEP medicine to prevent HIV.  Get tested for HIV at least once in your life, whether you are at risk for HIV or not.  Cancer screening tests  Cervical cancer screening: If you have a cervix, begin getting regular cervical cancer screening tests at age 21. Most people who have regular screenings with normal results can stop after age 65. Talk about this with your provider.  Breast cancer scan (mammogram): If you've ever had breasts, begin having regular mammograms starting at age 40. This is a scan to check for breast cancer.  Colon cancer screening: It is important to start screening for colon cancer at age 45.  Have a colonoscopy test every 10 years (or more often if you're at risk) Or, ask your provider about stool tests like a FIT test every year or Cologuard test every 3 years.  To learn more about your testing options, visit: https://www.[employer]/176798.pdf.  For help making a decision, visit: https://bit.ly/pp83283.  Prostate cancer screening test: If you have a prostate and are age 55 to 69, ask your provider if you would benefit from a yearly prostate cancer screening test.  Lung cancer screening: If you are a current or former smoker age 50 to 80, ask your care team if ongoing lung cancer screenings are right for you.  For informational purposes only. Not to replace the advice of your health care provider. Copyright   2023 [employer]. All rights reserved. Clinically reviewed by the [facility] Transitions Program. Grid[employer] 254886 - REV 01/24.

## 2024-03-16 LAB — VIT D+METAB SERPL-MCNC: 24 NG/ML (ref 20–50)

## 2024-04-22 ENCOUNTER — HOSPITAL ENCOUNTER (EMERGENCY)
Facility: CLINIC | Age: 24
Discharge: HOME | End: 2024-04-23
Attending: EMERGENCY MEDICINE | Admitting: EMERGENCY MEDICINE
Payer: COMMERCIAL

## 2024-04-22 VITALS
OXYGEN SATURATION: 100 % | TEMPERATURE: 97.8 F | HEART RATE: 72 BPM | RESPIRATION RATE: 18 BRPM | DIASTOLIC BLOOD PRESSURE: 100 MMHG | SYSTOLIC BLOOD PRESSURE: 145 MMHG

## 2024-04-22 DIAGNOSIS — F32.A DEPRESSION WITH SUICIDAL IDEATION: ICD-10-CM

## 2024-04-22 DIAGNOSIS — R45.851 DEPRESSION WITH SUICIDAL IDEATION: ICD-10-CM

## 2024-04-22 PROCEDURE — 99283 EMERGENCY DEPT VISIT LOW MDM: CPT

## 2024-04-22 ASSESSMENT — ACTIVITIES OF DAILY LIVING (ADL)
ADLS_ACUITY_SCORE: 35
ADLS_ACUITY_SCORE: 35

## 2024-04-23 ASSESSMENT — ACTIVITIES OF DAILY LIVING (ADL): ADLS_ACUITY_SCORE: 35

## 2024-04-23 NOTE — ED NOTES
Patient face timed group home staff [facility], as his phone number has been disconnected. Staff on way to get patient. Update: [facility] has new number [phone]
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
## 2024-04-23 NOTE — DISCHARGE INSTRUCTIONS
Please follow-up with your regular mental health and primary care physicians.  Thanks for your patience tonight

## 2024-05-03 DIAGNOSIS — I26.02 ACUTE SADDLE PULMONARY EMBOLISM WITH ACUTE COR PULMONALE (H): ICD-10-CM

## 2024-05-03 NOTE — TELEPHONE ENCOUNTER
Incoming refill request for Dmitry.    Octavio Kirkpatrick is followed at the [facility] for their history of PE.     They were last evaluated by our team on 11/09/2022 with the plan to remain on long term anticoagulation and return to clinic in 1 year. He is overdue at this point but is scheduled for August of this year.    Prescription updated and refills given to last through appt with Dr. Engle.    Gal OLIVAS RN  [facility]  Office: [phone]  Clinic: [phone]  Fax: [phone]

## 2024-05-09 ENCOUNTER — HOSPITAL ENCOUNTER (EMERGENCY)
Facility: CLINIC | Age: 24
Discharge: HOME | End: 2024-05-09
Attending: EMERGENCY MEDICINE | Admitting: EMERGENCY MEDICINE
Payer: COMMERCIAL

## 2024-05-09 VITALS
DIASTOLIC BLOOD PRESSURE: 100 MMHG | OXYGEN SATURATION: 100 % | RESPIRATION RATE: 20 BRPM | HEART RATE: 94 BPM | SYSTOLIC BLOOD PRESSURE: 141 MMHG | TEMPERATURE: 98 F

## 2024-05-09 DIAGNOSIS — F43.0 ACUTE REACTION TO SITUATIONAL STRESS: ICD-10-CM

## 2024-05-09 PROCEDURE — 99283 EMERGENCY DEPT VISIT LOW MDM: CPT

## 2024-05-09 ASSESSMENT — COLUMBIA-SUICIDE SEVERITY RATING SCALE - C-SSRS
5. HAVE YOU STARTED TO WORK OUT OR WORKED OUT THE DETAILS OF HOW TO KILL YOURSELF? DO YOU INTEND TO CARRY OUT THIS PLAN?: YES
1. IN THE PAST MONTH, HAVE YOU WISHED YOU WERE DEAD OR WISHED YOU COULD GO TO SLEEP AND NOT WAKE UP?: YES
2. HAVE YOU ACTUALLY HAD ANY THOUGHTS OF KILLING YOURSELF IN THE PAST MONTH?: YES
6. HAVE YOU EVER DONE ANYTHING, STARTED TO DO ANYTHING, OR PREPARED TO DO ANYTHING TO END YOUR LIFE?: YES
3. HAVE YOU BEEN THINKING ABOUT HOW YOU MIGHT KILL YOURSELF?: YES

## 2024-05-09 ASSESSMENT — ACTIVITIES OF DAILY LIVING (ADL)
ADLS_ACUITY_SCORE: 35
ADLS_ACUITY_SCORE: 35

## 2024-05-09 NOTE — ED TRIAGE NOTES
Pt presents voluntarily via EMS for SI. Pt states he has been contemplating jumping off a bridge. Pt is calm and cooperative. ABCs intact.     BP (!) 141/100   Pulse 94   Temp 98  F (36.7  C) (Temporal)   Resp 20   SpO2 100%        Triage Assessment (Adult)       Row Name 05/09/24 4155          Triage Assessment    Airway WDL WDL        Respiratory WDL    Respiratory WDL WDL        Cardiac WDL    Cardiac WDL WDL        Cognitive/Neuro/Behavioral WDL    Cognitive/Neuro/Behavioral WDL WDL

## 2024-05-10 NOTE — ED PROVIDER NOTES
Emergency Department Note      History of Present Illness     Chief Complaint  Suicidal    HPI  Octavio Kirkpatrick is a 23 year old male, on Eliquis, with a history of anxiety, depression, autism, and ADHD who presents to the ED for suicidal ideation. The patient states he has been feeling overwhelmed, because he has a state guardian and he feels that they do not care about him.  He reports he is going to be talking to his .  He reports he said some things earlier that he did not mean.  Specifically, he stated he was suicidal with a plan to jump off a bridge.  He currently regrets saying that.  He endorses no feelings of hurting himself or killing himself.  He was feeling overwhelmed and stressed earlier.  He has a psychiatrist, and he reports has been taking his medication as prescribed.  He endorses some itchiness on his posterior neck, which she has been taking multiple showers to help with.  No fever, chills, chest pain, shortness of breath.        Independent Historian  None    Review of External Notes  4/22/24: ED note reviewed    Past Medical History   Medical History and Problem List  ADHD  Anxiety  Autistic disorder  Depressive disorder  Heart murmur  Hernia  Thrombophlebitis  Palpitations  Prostate infection  Asthma  MANA    Medications  Eliquis   Flonase   Neurontin   Atarax   Zestril   Concerta   Seroquel   Zoloft   Ventolin  Tessalon    Surgical History   Tonsillectomy   Adenoidectomy   Planada teeth extraction   Hernia repair   PE tubes    Physical Exam   Patient Vitals for the past 24 hrs:   BP Temp Temp src Pulse Resp SpO2   05/09/24 1854 (!) 141/100 98  F (36.7  C) Temporal 94 20 100 %     Physical Exam  General: No acute distress.  Head: No obvious trauma to head.  Eyes:  Conjunctivae clear.   Neck: Normal range of motion.   CV: Skin is well perfused, no cyanosis  Respiratory: Effort normal. No audible wheezing.  Gastrointestinal: Non-distended.  Musculoskeletal: Normal range of motion. No  gross deformities.  Neuro: Alert. Moving all extremities appropriately.  Normal speech.    Skin: No rashes or lesions on exposed skin.      Diagnostics   Lab Results   Labs Ordered and Resulted from Time of ED Arrival to Time of ED Departure - No data to display    Independent Interpretation  None  ED Course    Medications Administered  Medications - No data to display    Discussion of Management  None    Social Determinants of Health adding to complexity of care  None    ED Course  ED Course as of 05/09/24 2029   Thu May 09, 2024   1926 I obtained history and performed a physical exam as noted above.       Medical Decision Making / Diagnosis     MIPS     None    MDM  Octavio Kirkpatrick is a 23 year old male presenting with suicidal ideation.  He is denying any suicidal ideation with me.  He states he was feeling overwhelmed and stressed, and said things that he did not mean.  He states he has no intention of dying, and wants to continue living.  He reports if he feels overwhelmed or has any thoughts of harming himself in the future, he will call his aunt or uncle.  He states he feels safe going home at this time.  He is hopeful that he will make progress after speaking with his  regarding his guardianship.  He endorses some itchiness on his posterior neck, he has been bathing frequently because of this.  I do not see any signs of rash or lesions.  I instructed him to try applying moisturizing lotion after bathing.  I also instructed him to try Benadryl to see if that helps.  He is encouraged to follow-up with his primary care provider.  Strict return precautions are given to return the emergency department if he has any concerns for safety or has any further thoughts of harming himself.  He reports he will do this.  He continues to deny any suicidal ideation.  I spoke to a worker at the group home, and they come to pick him up.  He remains calm and cooperative and is discharged home with a group home  worker.    Disposition  The patient was discharged.     ICD-10 Codes:    ICD-10-CM    1. Acute reaction to situational stress  F43.0            Discharge Medications  Discharge Medication List as of 5/9/2024  7:47 PM            Scribe Disclosure:  I, Feli Abbott, am serving as a scribe at 7:30 PM on 5/9/2024 to document services personally performed by Go Chiang MD based on my observations and the provider's statements to me.     [employer]      Go Chiang MD  05/09/24 2047

## 2024-05-10 NOTE — DISCHARGE INSTRUCTIONS
For the itchiness, we recommend that you try applying moisturizing lotion after taking a shower.  You could also try taking Benadryl to see if this helps with the itchiness.  We recommend that you follow-up with your primary care provider.    If at any point you feel overwhelmed or concern for your safety, please return to the emergency department immediately.

## 2024-05-13 ENCOUNTER — DOCUMENTATION ONLY (OUTPATIENT)
Dept: OTHER | Facility: CLINIC | Age: 24
End: 2024-05-13
Payer: COMMERCIAL

## 2024-05-20 ENCOUNTER — HOSPITAL ENCOUNTER (EMERGENCY)
Facility: CLINIC | Age: 24
Discharge: NURSING, REHAB OR LONG-TERM CARE | End: 2024-05-20
Attending: EMERGENCY MEDICINE | Admitting: EMERGENCY MEDICINE
Payer: COMMERCIAL

## 2024-05-20 VITALS
TEMPERATURE: 97.5 F | OXYGEN SATURATION: 98 % | BODY MASS INDEX: 23.88 KG/M2 | DIASTOLIC BLOOD PRESSURE: 87 MMHG | HEART RATE: 100 BPM | SYSTOLIC BLOOD PRESSURE: 146 MMHG | RESPIRATION RATE: 20 BRPM | WEIGHT: 186.07 LBS | HEIGHT: 74 IN

## 2024-05-20 DIAGNOSIS — R53.83 FATIGUE, UNSPECIFIED TYPE: ICD-10-CM

## 2024-05-20 DIAGNOSIS — R42 LIGHTHEADEDNESS: ICD-10-CM

## 2024-05-20 PROBLEM — F41.1 GENERALIZED ANXIETY DISORDER: Status: ACTIVE | Noted: 2023-09-01

## 2024-05-20 LAB
ALBUMIN SERPL BCG-MCNC: 4.4 G/DL (ref 3.5–5.2)
ALBUMIN UR-MCNC: 10 MG/DL
ALP SERPL-CCNC: 89 U/L (ref 40–150)
ALT SERPL W P-5'-P-CCNC: 21 U/L (ref 0–70)
AMPHETAMINES UR QL SCN: NORMAL
ANION GAP SERPL CALCULATED.3IONS-SCNC: 13 MMOL/L (ref 7–15)
APAP SERPL-MCNC: <5 UG/ML (ref 10–30)
APPEARANCE UR: CLEAR
AST SERPL W P-5'-P-CCNC: 27 U/L (ref 0–45)
ATRIAL RATE - MUSE: 90 BPM
BARBITURATES UR QL SCN: NORMAL
BASOPHILS # BLD AUTO: 0 10E3/UL (ref 0–0.2)
BASOPHILS NFR BLD AUTO: 1 %
BENZODIAZ UR QL SCN: NORMAL
BILIRUB SERPL-MCNC: 0.3 MG/DL
BILIRUB UR QL STRIP: NEGATIVE
BUN SERPL-MCNC: 10.8 MG/DL (ref 6–20)
BZE UR QL SCN: NORMAL
CALCIUM SERPL-MCNC: 9.3 MG/DL (ref 8.6–10)
CANNABINOIDS UR QL SCN: NORMAL
CHLORIDE SERPL-SCNC: 104 MMOL/L (ref 98–107)
COLOR UR AUTO: YELLOW
CREAT SERPL-MCNC: 1.26 MG/DL (ref 0.67–1.17)
DEPRECATED HCO3 PLAS-SCNC: 25 MMOL/L (ref 22–29)
DIASTOLIC BLOOD PRESSURE - MUSE: NORMAL MMHG
EGFRCR SERPLBLD CKD-EPI 2021: 82 ML/MIN/1.73M2
EOSINOPHIL # BLD AUTO: 0.1 10E3/UL (ref 0–0.7)
EOSINOPHIL NFR BLD AUTO: 2 %
ERYTHROCYTE [DISTWIDTH] IN BLOOD BY AUTOMATED COUNT: 11.8 % (ref 10–15)
FENTANYL UR QL: NORMAL
GLUCOSE SERPL-MCNC: 103 MG/DL (ref 70–99)
GLUCOSE UR STRIP-MCNC: NEGATIVE MG/DL
HCT VFR BLD AUTO: 48 % (ref 40–53)
HGB BLD-MCNC: 16.5 G/DL (ref 13.3–17.7)
HGB UR QL STRIP: NEGATIVE
HOLD SPECIMEN: NORMAL
IMM GRANULOCYTES # BLD: 0 10E3/UL
IMM GRANULOCYTES NFR BLD: 0 %
INTERPRETATION ECG - MUSE: NORMAL
KETONES UR STRIP-MCNC: NEGATIVE MG/DL
LEUKOCYTE ESTERASE UR QL STRIP: NEGATIVE
LYMPHOCYTES # BLD AUTO: 2 10E3/UL (ref 0.8–5.3)
LYMPHOCYTES NFR BLD AUTO: 35 %
MCH RBC QN AUTO: 30.6 PG (ref 26.5–33)
MCHC RBC AUTO-ENTMCNC: 34.4 G/DL (ref 31.5–36.5)
MCV RBC AUTO: 89 FL (ref 78–100)
MONOCYTES # BLD AUTO: 0.3 10E3/UL (ref 0–1.3)
MONOCYTES NFR BLD AUTO: 6 %
MUCOUS THREADS #/AREA URNS LPF: PRESENT /LPF
NEUTROPHILS # BLD AUTO: 3.2 10E3/UL (ref 1.6–8.3)
NEUTROPHILS NFR BLD AUTO: 56 %
NITRATE UR QL: NEGATIVE
NRBC # BLD AUTO: 0 10E3/UL
NRBC BLD AUTO-RTO: 0 /100
OPIATES UR QL SCN: NORMAL
P AXIS - MUSE: 77 DEGREES
PCP QUAL URINE (ROCHE): NORMAL
PH UR STRIP: 6.5 [PH] (ref 5–7)
PLATELET # BLD AUTO: 327 10E3/UL (ref 150–450)
POTASSIUM SERPL-SCNC: 3.7 MMOL/L (ref 3.4–5.3)
PR INTERVAL - MUSE: 148 MS
PROT SERPL-MCNC: 7.4 G/DL (ref 6.4–8.3)
QRS DURATION - MUSE: 86 MS
QT - MUSE: 348 MS
QTC - MUSE: 425 MS
R AXIS - MUSE: 68 DEGREES
RBC # BLD AUTO: 5.4 10E6/UL (ref 4.4–5.9)
RBC URINE: 2 /HPF
SALICYLATES SERPL-MCNC: <0.3 MG/DL
SODIUM SERPL-SCNC: 142 MMOL/L (ref 135–145)
SP GR UR STRIP: 1.03 (ref 1–1.03)
SQUAMOUS EPITHELIAL: <1 /HPF
SYSTOLIC BLOOD PRESSURE - MUSE: NORMAL MMHG
T AXIS - MUSE: -25 DEGREES
UROBILINOGEN UR STRIP-MCNC: NORMAL MG/DL
VENTRICULAR RATE- MUSE: 90 BPM
WBC # BLD AUTO: 5.6 10E3/UL (ref 4–11)
WBC URINE: <1 /HPF

## 2024-05-20 PROCEDURE — 80179 DRUG ASSAY SALICYLATE: CPT | Performed by: EMERGENCY MEDICINE

## 2024-05-20 PROCEDURE — 85025 COMPLETE CBC W/AUTO DIFF WBC: CPT | Performed by: EMERGENCY MEDICINE

## 2024-05-20 PROCEDURE — 81001 URINALYSIS AUTO W/SCOPE: CPT | Mod: XU | Performed by: EMERGENCY MEDICINE

## 2024-05-20 PROCEDURE — 80307 DRUG TEST PRSMV CHEM ANLYZR: CPT | Performed by: EMERGENCY MEDICINE

## 2024-05-20 PROCEDURE — 80053 COMPREHEN METABOLIC PANEL: CPT | Performed by: EMERGENCY MEDICINE

## 2024-05-20 PROCEDURE — 93005 ELECTROCARDIOGRAM TRACING: CPT

## 2024-05-20 PROCEDURE — 99284 EMERGENCY DEPT VISIT MOD MDM: CPT | Mod: 25

## 2024-05-20 PROCEDURE — 36415 COLL VENOUS BLD VENIPUNCTURE: CPT | Performed by: EMERGENCY MEDICINE

## 2024-05-20 PROCEDURE — 80143 DRUG ASSAY ACETAMINOPHEN: CPT | Performed by: EMERGENCY MEDICINE

## 2024-05-20 ASSESSMENT — COLUMBIA-SUICIDE SEVERITY RATING SCALE - C-SSRS
6. HAVE YOU EVER DONE ANYTHING, STARTED TO DO ANYTHING, OR PREPARED TO DO ANYTHING TO END YOUR LIFE?: NO
1. IN THE PAST MONTH, HAVE YOU WISHED YOU WERE DEAD OR WISHED YOU COULD GO TO SLEEP AND NOT WAKE UP?: NO
2. HAVE YOU ACTUALLY HAD ANY THOUGHTS OF KILLING YOURSELF IN THE PAST MONTH?: NO

## 2024-05-20 ASSESSMENT — ACTIVITIES OF DAILY LIVING (ADL)
ADLS_ACUITY_SCORE: 35
ADLS_ACUITY_SCORE: 33
ADLS_ACUITY_SCORE: 35

## 2024-05-20 NOTE — ED NOTES
RN called pts guardian. Guardian states pts psychiatrist changed his Seroquel from prn to scheduled r/t unstable behavior over the last several weeks at his home. Guardian is reaching out to the provider to verify dose and will fax the hospital a med list asap.
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
## 2024-05-22 ENCOUNTER — HOSPITAL ENCOUNTER (EMERGENCY)
Facility: CLINIC | Age: 24
Discharge: HOME | End: 2024-05-27
Attending: EMERGENCY MEDICINE | Admitting: EMERGENCY MEDICINE
Payer: COMMERCIAL

## 2024-05-22 ENCOUNTER — TELEPHONE (OUTPATIENT)
Dept: BEHAVIORAL HEALTH | Facility: CLINIC | Age: 24
End: 2024-05-22
Payer: COMMERCIAL

## 2024-05-22 DIAGNOSIS — Q86.0 FETAL ALCOHOL SYNDROME: ICD-10-CM

## 2024-05-22 DIAGNOSIS — F22 PARANOIA (H): ICD-10-CM

## 2024-05-22 PROBLEM — F79 INTELLECTUAL DISABILITY: Chronic | Status: ACTIVE | Noted: 2020-06-20

## 2024-05-22 PROBLEM — F84.0 AUTISM: Status: ACTIVE | Noted: 2023-07-23

## 2024-05-22 PROCEDURE — 250N000013 HC RX MED GY IP 250 OP 250 PS 637: Performed by: EMERGENCY MEDICINE

## 2024-05-22 PROCEDURE — 99285 EMERGENCY DEPT VISIT HI MDM: CPT

## 2024-05-22 RX ORDER — OLANZAPINE 5 MG/1
10 TABLET, ORALLY DISINTEGRATING ORAL 2 TIMES DAILY PRN
Status: DISCONTINUED | OUTPATIENT
Start: 2024-05-22 | End: 2024-05-27 | Stop reason: HOSPADM

## 2024-05-22 RX ORDER — ACETAMINOPHEN 325 MG/1
650 TABLET ORAL EVERY 4 HOURS PRN
Status: DISCONTINUED | OUTPATIENT
Start: 2024-05-22 | End: 2024-05-27 | Stop reason: HOSPADM

## 2024-05-22 RX ORDER — METHYLPHENIDATE HYDROCHLORIDE 36 MG/1
36 TABLET ORAL EVERY MORNING
Status: DISCONTINUED | OUTPATIENT
Start: 2024-05-23 | End: 2024-05-27 | Stop reason: HOSPADM

## 2024-05-22 RX ORDER — LISINOPRIL 5 MG/1
5 TABLET ORAL DAILY
Status: DISCONTINUED | OUTPATIENT
Start: 2024-05-23 | End: 2024-05-27 | Stop reason: HOSPADM

## 2024-05-22 RX ORDER — LANOLIN ALCOHOL/MO/W.PET/CERES
3 CREAM (GRAM) TOPICAL
Status: DISCONTINUED | OUTPATIENT
Start: 2024-05-22 | End: 2024-05-27 | Stop reason: HOSPADM

## 2024-05-22 RX ORDER — HYDROXYZINE HYDROCHLORIDE 25 MG/1
25 TABLET, FILM COATED ORAL
Status: DISCONTINUED | OUTPATIENT
Start: 2024-05-22 | End: 2024-05-27 | Stop reason: HOSPADM

## 2024-05-22 RX ORDER — ALBUTEROL SULFATE 90 UG/1
2 AEROSOL, METERED RESPIRATORY (INHALATION) EVERY 6 HOURS PRN
COMMUNITY

## 2024-05-22 RX ORDER — LORAZEPAM 1 MG/1
1 TABLET ORAL EVERY 8 HOURS PRN
Status: DISCONTINUED | OUTPATIENT
Start: 2024-05-22 | End: 2024-05-27 | Stop reason: HOSPADM

## 2024-05-22 RX ORDER — TOLTERODINE 4 MG/1
4 CAPSULE, EXTENDED RELEASE ORAL DAILY
Status: DISCONTINUED | OUTPATIENT
Start: 2024-05-23 | End: 2024-05-27 | Stop reason: HOSPADM

## 2024-05-22 RX ORDER — ALBUTEROL SULFATE 90 UG/1
2 AEROSOL, METERED RESPIRATORY (INHALATION) EVERY 6 HOURS PRN
Status: DISCONTINUED | OUTPATIENT
Start: 2024-05-22 | End: 2024-05-27 | Stop reason: HOSPADM

## 2024-05-22 RX ORDER — VITAMIN B COMPLEX
25 TABLET ORAL DAILY
Status: DISCONTINUED | OUTPATIENT
Start: 2024-05-23 | End: 2024-05-27 | Stop reason: HOSPADM

## 2024-05-22 RX ADMIN — APIXABAN 5 MG: 5 TABLET, FILM COATED ORAL at 23:07

## 2024-05-22 ASSESSMENT — ACTIVITIES OF DAILY LIVING (ADL)
ADLS_ACUITY_SCORE: 35

## 2024-05-22 NOTE — CONSULTS
Care Management Follow Up    Length of Stay (days): 0    Expected Discharge Date:        Additional Information:  SW was called to assist with collateral. Patient lives in a GH. The address on file appears to be an apartment. Unknown if this address is correct.  MD reports that the number patient gave for his GH is not correct.     JEIMY suggests contacting the guardian in order to obtain collateral.     MADIHA Saldivar, [facility]  Emergency Room   Please contact the SW on the floor in which the patient is staying for any questions or concerns
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
## 2024-05-23 ENCOUNTER — TELEPHONE (OUTPATIENT)
Dept: BEHAVIORAL HEALTH | Facility: CLINIC | Age: 24
End: 2024-05-23
Payer: COMMERCIAL

## 2024-05-23 PROBLEM — F90.2 ATTENTION DEFICIT HYPERACTIVITY DISORDER (ADHD), COMBINED TYPE: Status: ACTIVE | Noted: 2021-06-21

## 2024-05-23 PROBLEM — F31.9 BIPOLAR DISORDER (H): Status: ACTIVE | Noted: 2024-05-23

## 2024-05-23 PROBLEM — F84.0 AUTISTIC DISORDER: Status: ACTIVE | Noted: 2020-06-15

## 2024-05-23 LAB — SARS-COV-2 RNA RESP QL NAA+PROBE: NEGATIVE

## 2024-05-23 PROCEDURE — 250N000013 HC RX MED GY IP 250 OP 250 PS 637: Performed by: EMERGENCY MEDICINE

## 2024-05-23 PROCEDURE — 250N000013 HC RX MED GY IP 250 OP 250 PS 637: Performed by: NURSE PRACTITIONER

## 2024-05-23 PROCEDURE — 99284 EMERGENCY DEPT VISIT MOD MDM: CPT | Performed by: NURSE PRACTITIONER

## 2024-05-23 PROCEDURE — 87635 SARS-COV-2 COVID-19 AMP PRB: CPT | Performed by: EMERGENCY MEDICINE

## 2024-05-23 RX ORDER — QUETIAPINE FUMARATE 50 MG/1
150 TABLET, EXTENDED RELEASE ORAL AT BEDTIME
Status: DISCONTINUED | OUTPATIENT
Start: 2024-05-23 | End: 2024-05-27 | Stop reason: HOSPADM

## 2024-05-23 RX ADMIN — TOLTERODINE TARTRATE 4 MG: 4 CAPSULE, EXTENDED RELEASE ORAL at 07:36

## 2024-05-23 RX ADMIN — METHYLPHENIDATE HYDROCHLORIDE 36 MG: 36 TABLET, EXTENDED RELEASE ORAL at 07:36

## 2024-05-23 RX ADMIN — APIXABAN 5 MG: 5 TABLET, FILM COATED ORAL at 07:36

## 2024-05-23 RX ADMIN — LISINOPRIL 5 MG: 5 TABLET ORAL at 07:36

## 2024-05-23 RX ADMIN — APIXABAN 5 MG: 5 TABLET, FILM COATED ORAL at 20:31

## 2024-05-23 RX ADMIN — Medication 25 MCG: at 07:36

## 2024-05-23 RX ADMIN — QUETIAPINE FUMARATE 150 MG: 50 TABLET, EXTENDED RELEASE ORAL at 23:31

## 2024-05-23 RX ADMIN — SERTRALINE HYDROCHLORIDE 150 MG: 100 TABLET ORAL at 07:36

## 2024-05-23 ASSESSMENT — COLUMBIA-SUICIDE SEVERITY RATING SCALE - C-SSRS
TOTAL  NUMBER OF INTERRUPTED ATTEMPTS SINCE LAST CONTACT: NO
2. HAVE YOU ACTUALLY HAD ANY THOUGHTS OF KILLING YOURSELF?: NO
TOTAL  NUMBER OF ABORTED OR SELF INTERRUPTED ATTEMPTS SINCE LAST CONTACT: NO
6. HAVE YOU EVER DONE ANYTHING, STARTED TO DO ANYTHING, OR PREPARED TO DO ANYTHING TO END YOUR LIFE?: NO
ATTEMPT SINCE LAST CONTACT: NO
1. SINCE LAST CONTACT, HAVE YOU WISHED YOU WERE DEAD OR WISHED YOU COULD GO TO SLEEP AND NOT WAKE UP?: NO
SUICIDE, SINCE LAST CONTACT: NO

## 2024-05-23 NOTE — CONSULTS
[facility]  Psychiatry Consultation      Patient name: Octavio Kirkpatrick   MRN: [id number]    Age: 23 year old    YOB: 2000    Please refer to the [facility] 's note for additional historical information, safety assessment, and psychosocial treatment details:     Reason for consult: medication management for anxiety, ADHD, ASD, emotional dysregulation    Pertinent information related to this encounter:    Nick Kirkpatrick is a 23 year old male who presented to the emergency department for evaluation after being apprehended by police after getting on a bus to go to the [place]. Guardian had become worried that patient was going to get on a plane to fly to [place] without her consent. Patient has a past medical history significant for ADHD, anxiety, depression, and autism spectrum disorder.      On interview with patient today, he presents as bright in affect with pressured speech and a mildly tangential thought process. He reports experiencing a lot of issues with his guardian, feeling as though she is controlling him. He reports he had made a plan to fly to [place] to meet up with a friend he met through social media, who also has autism. Patient reports he has a large following on social media, where he advocates for people with disabilities and ASD. 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 He mentions that he had emailed his guardian, telling her of his plan to fly to [place]. Yesterday, he reports he became upset and attempted to take a bus to [place] to walk around and burn off some steam. He had planned to fly to [place] today. Reports he had not walked across traffic, had not been attempting to harm himself. Denies any current desire to harm himself. Denies any current desire to harm others. He denies any auditory or visual hallucinations. Denies feeling as though people are out to get him, aside from Magda, his guardian. Reports feeling as though his quetiapine dose  was too high, and worried about heart problems due to his history of a pulmonary embolism in 2021. Reports he is at a higher risk than the generally population to get another PE. Reviewed an event which happened earlier this year whereby patient reportedly said he would come to the [facility] with a gun. Pt reports he made this comment out of anger, and denies he would ever actually do anything to harm anyone. He does not like guns and has no access to guns. Reports he was found incompetent and was not charged with any crimes. Patient reports a desire to return home today. He reports he would be agreeable to taking quetiapine XR at bedtime. Discussed we would need to coordinate plan of care with guardian.       Medical History:  Refer to the latest internal medicine/hospitalist note which I reviewed.   Past Medical History:   Diagnosis Date    ADHD     Anxiety     Autistic disorder     Depressive disorder 2008    Heart murmur     Hernia, abdominal     History of thrombophlebitis     Palpitations     Prostate infection     Uncomplicated asthma 2000        Medications:    Current Facility-Administered Medications:     acetaminophen (TYLENOL) tablet 650 mg, 650 mg, Oral, Q4H PRN, Jaime Elena MD    albuterol (PROVENTIL HFA/VENTOLIN HFA) inhaler, 2 puff, Inhalation, Q6H PRN, Jaime Elena MD    apixaban ANTICOAGULANT (ELIQUIS) tablet 5 mg, 5 mg, Oral, BID, Jaime Elena MD, 5 mg at 05/23/24 0736    hydrOXYzine HCl (ATARAX) tablet 25 mg, 25 mg, Oral, At Bedtime PRN, Jaime Elena MD    lisinopril (ZESTRIL) tablet 5 mg, 5 mg, Oral, Daily, Jaime Elena MD, 5 mg at 05/23/24 0736    LORazepam (ATIVAN) tablet 1 mg, 1 mg, Oral, Q8H PRN, Jaime Elena MD    melatonin tablet 3 mg, 3 mg, Oral, At Bedtime PRN, Jaime Elena MD    methylphenidate HCl ER (OSM) (CONCERTA) CR tablet 36 mg, 36 mg, Oral, QAM, Jaime Elena MD, 36 mg at 05/23/24 0736     "OLANZapine zydis (zyPREXA) ODT tab 10 mg, 10 mg, Oral, BID PRN, Jaime Elena MD    sertraline (ZOLOFT) tablet 150 mg, 150 mg, Oral, Daily, Jaime Elena MD, 150 mg at 05/23/24 0736    tolterodine ER (DETROL [place]) 24 hr capsule 4 mg, 4 mg, Oral, Daily, Jaime Elena MD, 4 mg at 05/23/24 0736    Vitamin D3 (CHOLECALCIFEROL) tablet 25 mcg, 25 mcg, Oral, Daily, Jaime Elena MD, 25 mcg at 05/23/24 0736    Current Outpatient Medications:     albuterol (PROAIR HFA/PROVENTIL HFA/VENTOLIN HFA) 108 (90 Base) MCG/ACT inhaler, Inhale 2 puffs into the lungs every 6 hours as needed for shortness of breath, wheezing or cough, Disp: , Rfl:     apixaban ANTICOAGULANT (ELIQUIS) 5 MG tablet, Take 1 tablet (5 mg) by mouth 2 times daily, Disp: 60 tablet, Rfl: 3    lisinopril (ZESTRIL) 5 MG tablet, Take 1 tablet (5 mg) by mouth daily, Disp: 90 tablet, Rfl: 2    methylphenidate (CONCERTA) 36 MG CR tablet, Take 36 mg by mouth every morning, Disp: , Rfl:     Sertraline HCl (ZOLOFT PO), Take 150 mg by mouth daily , Disp: , Rfl:     tolterodine ER (DETROL LA) 4 MG 24 hr capsule, Take 4 mg by mouth daily, Disp: , Rfl:     VITAMIN D, CHOLECALCIFEROL, PO, Take 400 Units by mouth daily , Disp: , Rfl:     Vital Signs:    B/P: 110/90, T: 97.9, P: 77, R: 16  Estimated body mass index is 23.89 kg/m  as calculated from the following:    Height as of this encounter: 1.88 m (6' 2\").    Weight as of this encounter: 84.4 kg (186 lb 1.1 oz).       Mental status examination:  Appearance: awake, alert, adequately groomed, dressed in hospital scrubs, and appeared as age stated  Attitude:  cooperative  Eye Contact:  good  Mood:   frustrated  Affect:  mood congruent, intensity is heightened, and reactive  Speech:  pressured speech and rambling  Psychomotor Behavior:  no evidence of tardive dyskinesia, dystonia, or tics  Throught Process:  logical and tangental  Associations:  no loose associations  Thought Content:  no evidence " of suicidal ideation or homicidal ideation, no evidence of psychotic thought, no auditory hallucinations present, and no visual hallucinations present  Insight:  fair  Judgement:  fair  Oriented to:  time, person, and place  Attention Span and Concentration:  fair  Recent and Remote Memory:  fair      Diagnoses:    Generalized Anxiety Disorder  ADHD, combined type  Intellectual disability  Autism spectrum disorder  R/o bipolar disorder     Recommendations:  - Spoke with patient about quetiapine XR, to which he was agreeable. Would recommend initiation of quetiapine  mg nightly for mood stabilization and sleep.   - Would continue remainder of medications unchanged  -  Patient is under active guardianship and does not require a 72 hour hold.   - At this point, I do not feel he would benefit from inpatient psychiatric hospitalization. He denies suicidal or homicidal thinking. Denies auditory or visual hallucinations. Does not appear to demonstrate paranoid thinking at this point, but does feel he is being controlled by his guardian and seems to resist anything she wants him to do.   - In my opinion, he would be appropriate for outpatient resources and [facility] supports  - He is concerned about taking too much quetiapine and the potential to affect his heart. Appears to have some health anxiety related to his pulmonary embolism in 2021.   - Patient may benefit from outpatient neuropsychiatric testing for diagnostic clarification. His speech is somewhat pressured and it appears he had been awake during the night making phone calls to family, talking loudly. I do question possible bipolar disorder, but this will need further assessment.  - [facility] coordinating outpatient plan of care with guardian  - Patient would be appropriate to discharge if guardian gives consent      Please reconsult with psychiatry as needed.   Sanjeev Gardner, CNP

## 2024-05-23 NOTE — ED NOTES
IP MH Referral Acuity Rating Score (RARS)    [facility] complete at referral to IP MH, with DEC; and, daily while awaiting IP MH placement. Call score to PPS.  CRITERIA SCORING   New 71 HH and Involuntary for IP MH (not adolescent) 0/1   Boarding over 24 hours  0/1   Vulnerable adult at least 55+ with multiple co morbidities; or, Patient age 11 or under 0/1   Suicide ideation without relief of precipitating factors 0/1   Current plan for suicide 0/1   Current plan for homicide 0/1   Imminent risk or actual attempt to seriously harm another without relief of factors precipitating the attempt 0/1   Severe dysfunction in daily living (ex: complete neglect for self care, extreme disruption in vegetative function, extreme deterioration in social interactions) 1/1   Recent (last 2 weeks) or current physical aggression in the ED 0/1   Restraints or seclusion episode in ED 0/1   Verbal aggression, agitation, yelling, etc., while in the ED 0/1   Active psychosis with psychomotor agitation or catatonia 1/1   Need for constant or near constant redirection (from leaving, from others, etc).  0/1   Intrusive or disruptive behaviors 0/1   TOTAL Acuity Total Score: 2       Spoke with ángela at central intake at 7:15 PM on 5/22/2024    Yumiko Camacho, MADIHA, LICSW, LMHP  DEC - Triage & Transition Services  Callback: [phone]

## 2024-05-23 NOTE — ED NOTES
"Pt has stated multiple times that he is being mistreated by his guardian. Pt stated that he \"has a feeling she will get arrested tonight or in the morning because of all the evidence the police have\". Pt requested writer to contact PD to see if guardian is in their custody, writer declined d/t the inappropriate nature of the request.   "

## 2024-05-23 NOTE — TELEPHONE ENCOUNTER
S: [facility] ED , DEC  Yumiko  calling at 7:10 PM about a 23 year old/Male presenting by police after he sent an email to guardian that he was going on vacation with an unknown person. [place]. Pt has been having escalating behavior since December. Highly paranoid of guardian: thinks he is being drugged, that is guardian is spending all his money. Pt threatened to kill guardian. Having delusional and erratic thoughts and behavior. Keeps eloping from group home.    B: Pt arrived via Police. Presenting problem, stressors: escalating since December. 4th ed visit in last 3 weeks. Refusing meds and all MH follow up.     Pt affect in ED: Disorganized and Paranoid  Pt Dx: Major Depressive Disorder, Generalized Anxiety Disorder, ADHD, Autism Spectrum Disorder, Intellectual Disability, and FAS  Previous IPMH hx? Yes: in childhood.   Pt denies SI, but threatening suicide to staff, posting videos to social media of suicidal gestures. .   Hx of suicide attempt? No  Pt denies SIB  Pt endorses HI towards guardian, no plans or intent   Pt  is very paranoid that someone going to kill him and messing with stuff . No AH/VH.   Pt RARS Score: 2    Hx of aggression/violence, sexual offenses, legal concerns, Epic care plan? describe: police are pressing charges on behalf of guardian for threats towards her.    Current concerns for aggression this visit? No  Does pt have a history of Civil Commitment? No  Is Pt their own guardian? No, Pt [facility]    Pt is prescribed medication. Is patient medication compliant? No  Pt  has services set up but is refusing all of them   CD concerns: None  Acute or chronic medical concerns: No  Does Pt present with specific needs, assistive devices, or exclusionary criteria? None      Pt is ambulatory  Pt is able to perform ADLs independently      A: Pt to be reviewed for IPMH admission. Pt's legal guardian Steffi consents to Tx   Preferred  placement: [facility]    COVID Symptoms: No  If yes, COVID test required   Utox: Negative   CMP: Abnormalities: creatinine: 1.26; glucose: 103  CBC: WNL  HCG: N/A    R: Patient cleared and ready for behavioral bed placement: Yes  Pt placed on [facility] worklist? Yes    Does Patient need a Transfer Center request created? Yes, writer completed Transfer Center request at: 7:23 PM

## 2024-05-23 NOTE — ED NOTES
RN ED Mental Health Handoff Note    72 hour hold    Does patient require 1:1? No video monitoring by security    Hold and rights been given and documented for patient: Yes    Is the patient in  scrubs? No    Has the patient been searched? Yes    Is the 15 minute observation tool up to date? Yes    Was patient issued a welcome folder? Yes    Room check completed this shift: Yes    PSS3 and [place] Assessment/Reassessment this shift:    C-SSRS ([place])      Date and Time Q1 Wished to be Dead (Past Month) Q2 Suicidal Thoughts (Past Month) Q3 Suicidal Thought Method Q4 Suicidal Intent without Specific Plan Q5 Suicide Intent with Specific Plan Q6 Suicide Behavior (Lifetime) Within the Past 3 Months? Level of Risk per Screen Level of Risk per Screen User   05/22/24 1750 0-->no 0-->no -- -- -- -- -- -- no risks indicated BAQ   05/22/24 1631 0-->no 0-->no -- -- -- 0-->no -- -- no risks indicated RAT   05/22/24 1528 0-->no 0-->no -- -- -- 0-->no -- -- no risks indicated VENKATESH            Behavioral status of patient: Green    Code 21 called this shift? No    Use of restraints/seclusion this shift? No    Most recent vital signs:  Temp: 97.9  F (36.6  C) Temp src: Oral BP: (!) 146/101 Pulse: 107   Resp: 16 SpO2: 98 % O2 Device: None (Room air)      Medications:  Scheduled medication compliance? Yes    PRN Meds administered this shift? No    Medications   acetaminophen (TYLENOL) tablet 650 mg (has no administration in time range)   LORazepam (ATIVAN) tablet 1 mg (has no administration in time range)   OLANZapine zydis (zyPREXA) ODT tab 10 mg (has no administration in time range)   melatonin tablet 3 mg (has no administration in time range)   hydrOXYzine HCl (ATARAX) tablet 25 mg (has no administration in time range)   albuterol (PROVENTIL HFA/VENTOLIN HFA) inhaler (has no administration in time range)   apixaban ANTICOAGULANT (ELIQUIS) tablet 5 mg (5 mg Oral $Given 5/22/24 0332)   lisinopril (ZESTRIL) tablet 5 mg (has no  administration in time range)   methylphenidate HCl ER (OSM) (CONCERTA) CR tablet 36 mg (has no administration in time range)   sertraline (ZOLOFT) tablet 150 mg (has no administration in time range)   tolterodine ER (DETROL [place]) 24 hr capsule 4 mg (has no administration in time range)   Vitamin D3 (CHOLECALCIFEROL) tablet 25 mcg (has no administration in time range)         ADLs    Meal Provided this shift? No    Hygiene items provided? yes    ADLs completed? No    Date of last shower: unknown    Any significant events this shift? No    Any information that would be helpful in caring for this patient?  Pt is a vulnerable adult    Family present/updated? No    Location of patient's belongings: with patient    Critical Care Minutes:  Does the patient need critical care minutes documented? No

## 2024-05-23 NOTE — ED NOTES
"Pt has been on phone talking loudly since I assumed care at 0300 today. Asked to quiet down for other patients who are sleeping. Pt agrees but continues to speak very loudly stating, \"Sorry, I'm just talking to my family.\"  "

## 2024-05-23 NOTE — ED NOTES
Given a drink and crackers. Continues to speak very loudly on phone. Reminded that he is disturbing other patients and should lower volume.  Have not spoken with patient otherwise as he has been talking on the phone since writer assumed care.

## 2024-05-23 NOTE — PLAN OF CARE
"Octavio Kirkpatrick  May 22, 2024  Plan of Care Hand-off Note     Patient Care Path: inpatient mental health    Plan for Care:   Patient was brought to the emergency department at the request of his guardian due to patient's increasing erratic behavior and paranoia.  Patient is denying any and all mental health symptoms and feels that his guardian is trying to harm him.  Patient reports that he has a  and is pressing charges against [facility] and his guardian due to abuse of power.  He also feels that they are mismanaging his finances.  Patient is articulate, agitated and very fixated on being heard and voicing his upset and anger over the situation.  Patient had plans to go to [place] tomorrow to visit a friend and informed his guardians via email today.  Due to patient's need for 24-hour supervision and patient's intellectual ability the person patient is going to be seeing needs to be \"bedded\" by the [facility] to ensure the patient is safe.  Patient is not cooperative on giving any information to the guardian.  Patient is hyper fixated on his current guardian and spends most of the time stating how much she does not like her.  Per guardian she would like patient to be evaluated and seen by a psychiatrist due to patient's increased elopement, suicidal ideation. agitation and paranoia.  She is also hoping that a psychiatrist or mental health provider can sit with patient and create a plan of care telling patient that he needs to see his psychiatrist take his medication and attend therapy regularly in order to avoid coming back to the hospital.  Patient has also been seen 4 times in this hospital over the past few weeks.    Identified Goals and Safety Issues: Due to guardians insistent on patient being hospitalized and/or seen by a psychiatrist.  Patient will be placed on the inpatient psychiatric list.  The plan is for psychiatry to see patient tomorrow and if they deem patient safe to discharge he will be " able to be discharged back to his group home.  The goal will be for psychiatry and/or extended care to talk to patient about the importance of taking his psychiatric medication and attending therapy.        Legal Status: Legal Status at Admission: Guardian/ad litum    Psychiatry Consult: Psychiatric consult placed.       Updated Dr. Elena regarding plan of care.           HENOK MartinoSW

## 2024-05-23 NOTE — ED NOTES
RN ED Mental Health Handoff Note  State guardian hold    Does patient require 1:1? No    Hold and rights been given and documented for patient: No    Is the patient in BH scrubs? Yes    Has the patient been searched? Yes    Is the 15 minute observation tool up to date? Yes    Was patient issued a welcome folder? Not this shift    Room check completed this shift: Yes    PSS3 and [place] Assessment/Reassessment this shift:    C-SSRS ([place])      Date and Time Q1 Wished to be Dead (Past Month) Q2 Suicidal Thoughts (Past Month) Q3 Suicidal Thought Method Q4 Suicidal Intent without Specific Plan Q5 Suicide Intent with Specific Plan Q6 Suicide Behavior (Lifetime) Within the Past 3 Months? Level of Risk per Screen Level of Risk per Screen User   05/22/24 1750 0-->no 0-->no -- -- -- -- -- -- no risks indicated BAQ   05/22/24 1631 0-->no 0-->no -- -- -- 0-->no -- -- no risks indicated RAT   05/22/24 1528 0-->no 0-->no -- -- -- 0-->no -- -- no risks indicated VENKATESH            Behavioral status of patient: Green    Code 21 called this shift? No    Use of restraints/seclusion this shift? No    Most recent vital signs:      BP: (!) 110/90 Pulse: 77   Resp: 16 SpO2: 97 % O2 Device: None (Room air)      Medications:  Scheduled medication compliance? No    PRN Meds administered this shift? No    Medications   acetaminophen (TYLENOL) tablet 650 mg (has no administration in time range)   LORazepam (ATIVAN) tablet 1 mg (has no administration in time range)   OLANZapine zydis (zyPREXA) ODT tab 10 mg (has no administration in time range)   melatonin tablet 3 mg (has no administration in time range)   hydrOXYzine HCl (ATARAX) tablet 25 mg (has no administration in time range)   albuterol (PROVENTIL HFA/VENTOLIN HFA) inhaler (has no administration in time range)   apixaban ANTICOAGULANT (ELIQUIS) tablet 5 mg (5 mg Oral $Given 5/23/24 0736)   lisinopril (ZESTRIL) tablet 5 mg (5 mg Oral $Given 5/23/24 0736)   methylphenidate HCl ER (OSM)  (CONCERTA) CR tablet 36 mg (36 mg Oral $Given 5/23/24 0736)   sertraline (ZOLOFT) tablet 150 mg (150 mg Oral $Given 5/23/24 0736)   tolterodine ER (DETROL LA) 24 hr capsule 4 mg (4 mg Oral $Given 5/23/24 0736)   Vitamin D3 (CHOLECALCIFEROL) tablet 25 mcg (25 mcg Oral $Given 5/23/24 0736)   QUEtiapine (SEROquel XR) 24 hr tablet 150 mg (has no administration in time range)         ADLs    Meal Provided this shift? Yes    Hygiene items provided? Yes    ADLs completed? Yes    Date of last shower: before coming into hospital    Any significant events this shift? Yes. Details: Pt is cleared to discharge by psych but group home is not accepting till weekend is over, looking for a crisis placement/ pt is social boarder now as of 1630. Pt would rather stay here then go to a crisis center per patient.     Any information that would be helpful in caring for this patient?  Postive words of affirmation.  [employer] is his outlet    Family present/updated? guardian    Location of patient's belongings: green pod    Critical Care Minutes:  Does the patient need critical care minutes documented? No

## 2024-05-23 NOTE — TELEPHONE ENCOUNTER
R: [place]  Access Inpatient Bed Call Log  5/23/24 @ 1:00am   Intake has called facilities that have not updated their bed status within the last 12 hours.     *METRO:  [place] -- [facility]: @ capacity.  [facility]:  @ cap per website.  [place] -- [facility]: @ cap per website. Low acuity  [place] -- [facility]: @ cap per website. Low acuity only.  [place] -- [facility]: @ cap per website.  [facility]: @ cap per website.  [facility] - @ cap per website. Ages 18-28, Voluntary only, NO aggression/physical/sexual assault, violence hx or drug abuse, or psychosis. Negative Covid.  [place] -- [facility]: @ cap per website.  [place] -- [facility]: @ cap per website.  [place] -- [facility]: @ cap per website. Do not review overnight.     Pt remains on waitlist pending appropriate placement availability.    2:35 AM Intake called [facility]. Per Myra, they are capped.

## 2024-05-23 NOTE — PHARMACY-ADMISSION MEDICATION HISTORY
"Pharmacist Admission Medication History    Admission medication history is complete. The information provided in this note is only as accurate as the sources available at the time of the update.    Information Source(s): Patient, Hospital records, and CareEverywhere/SureScripts via in-person    Pertinent Information: Patient says he is not taking Seroquel anymore because his caregiver \"was overdosing him on it\", so I removed it from his list.     Changes made to PTA medication list:  Added: Albuterol inhaler   Deleted: Seroquel, Flonase, gabapentin, hydroxyzine, and oxybutynin   Changed: None    Allergies reviewed with patient and updates made in EHR: no    Medication History Completed By: Louis Sifuentes [facility] 5/22/2024 10:13 PM    PTA Med List   Medication Sig Last Dose    albuterol (PROAIR HFA/PROVENTIL HFA/VENTOLIN HFA) 108 (90 Base) MCG/ACT inhaler Inhale 2 puffs into the lungs every 6 hours as needed for shortness of breath, wheezing or cough Unknown at prn    apixaban ANTICOAGULANT (ELIQUIS) 5 MG tablet Take 1 tablet (5 mg) by mouth 2 times daily 5/22/2024 at am    lisinopril (ZESTRIL) 5 MG tablet Take 1 tablet (5 mg) by mouth daily 5/22/2024 at am    methylphenidate (CONCERTA) 36 MG CR tablet Take 36 mg by mouth every morning 5/22/2024 at am    Sertraline HCl (ZOLOFT PO) Take 150 mg by mouth daily  5/22/2024 at am    tolterodine ER (DETROL LA) 4 MG 24 hr capsule  5/22/2024 at am    VITAMIN D, CHOLECALCIFEROL, PO Take 400 Units by mouth daily  5/22/2024 at am      "
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
## 2024-05-24 PROCEDURE — 250N000013 HC RX MED GY IP 250 OP 250 PS 637: Performed by: EMERGENCY MEDICINE

## 2024-05-24 RX ADMIN — Medication 25 MCG: at 13:59

## 2024-05-24 RX ADMIN — LISINOPRIL 5 MG: 5 TABLET ORAL at 13:59

## 2024-05-24 RX ADMIN — APIXABAN 5 MG: 5 TABLET, FILM COATED ORAL at 13:59

## 2024-05-24 RX ADMIN — SERTRALINE HYDROCHLORIDE 150 MG: 100 TABLET ORAL at 13:58

## 2024-05-24 RX ADMIN — TOLTERODINE TARTRATE 4 MG: 4 CAPSULE, EXTENDED RELEASE ORAL at 13:41

## 2024-05-24 RX ADMIN — APIXABAN 5 MG: 5 TABLET, FILM COATED ORAL at 20:43

## 2024-05-24 ASSESSMENT — COLUMBIA-SUICIDE SEVERITY RATING SCALE - C-SSRS
2. HAVE YOU ACTUALLY HAD ANY THOUGHTS OF KILLING YOURSELF?: NO
6. HAVE YOU EVER DONE ANYTHING, STARTED TO DO ANYTHING, OR PREPARED TO DO ANYTHING TO END YOUR LIFE?: NO
TOTAL  NUMBER OF INTERRUPTED ATTEMPTS SINCE LAST CONTACT: NO
TOTAL  NUMBER OF ABORTED OR SELF INTERRUPTED ATTEMPTS SINCE LAST CONTACT: NO
SUICIDE, SINCE LAST CONTACT: NO
ATTEMPT SINCE LAST CONTACT: NO
1. SINCE LAST CONTACT, HAVE YOU WISHED YOU WERE DEAD OR WISHED YOU COULD GO TO SLEEP AND NOT WAKE UP?: NO

## 2024-05-24 NOTE — ED NOTES
RN ED Mental Health Handoff Note    No hold, has guardian    Does patient require 1:1? No    Hold and rights been given and documented for patient: Yes    Is the patient in  scrubs? Yes    Has the patient been searched? Yes    Is the 15 minute observation tool up to date? Yes    Was patient issued a welcome folder? Yes    Room check completed this shift: Yes    PSS3 and [place] Assessment/Reassessment this shift:    C-SSRS ([place])      Date and Time Q1 Wished to be Dead (Past Month) Q2 Suicidal Thoughts (Past Month) Q3 Suicidal Thought Method Q4 Suicidal Intent without Specific Plan Q5 Suicide Intent with Specific Plan Q6 Suicide Behavior (Lifetime) Within the Past 3 Months? Level of Risk per Screen Level of Risk per Screen User   05/22/24 1750 0-->no 0-->no -- -- -- -- -- -- no risks indicated BAQ   05/22/24 1631 0-->no 0-->no -- -- -- 0-->no -- -- no risks indicated RAT   05/22/24 1528 0-->no 0-->no -- -- -- 0-->no -- -- no risks indicated VENKATESH            Behavioral status of patient: Green    Code 21 called this shift? No    Use of restraints/seclusion this shift? No    Most recent vital signs:  Temp: 98.5  F (36.9  C) Temp src: Oral BP: 115/87 Pulse: 80   Resp: 18 SpO2: 98 % O2 Device: None (Room air)      Medications:  Scheduled medication compliance? Yes    PRN Meds administered this shift? No    Medications   acetaminophen (TYLENOL) tablet 650 mg (has no administration in time range)   LORazepam (ATIVAN) tablet 1 mg (has no administration in time range)   OLANZapine zydis (zyPREXA) ODT tab 10 mg (has no administration in time range)   melatonin tablet 3 mg (has no administration in time range)   hydrOXYzine HCl (ATARAX) tablet 25 mg (has no administration in time range)   albuterol (PROVENTIL HFA/VENTOLIN HFA) inhaler (has no administration in time range)   apixaban ANTICOAGULANT (ELIQUIS) tablet 5 mg (5 mg Oral $Given 5/23/24 2031)   lisinopril (ZESTRIL) tablet 5 mg (5 mg Oral $Given 5/23/24 8511)    methylphenidate HCl ER (OSM) (CONCERTA) CR tablet 36 mg (36 mg Oral $Given 5/23/24 0736)   sertraline (ZOLOFT) tablet 150 mg (150 mg Oral $Given 5/23/24 0736)   tolterodine ER (DETROL LA) 24 hr capsule 4 mg (4 mg Oral $Given 5/23/24 0736)   Vitamin D3 (CHOLECALCIFEROL) tablet 25 mcg (25 mcg Oral $Given 5/23/24 0736)   QUEtiapine (SEROquel XR) 24 hr tablet 150 mg (150 mg Oral $Given 5/23/24 2861)         ADLs    Meal Provided this shift? Yes    Hygiene items provided? Yes    ADLs completed? Yes    Date of last shower: unknown    Any significant events this shift? No    Any information that would be helpful in caring for this patient?  Pt enjoys eating saltine and rhiannon crackers. Pt can hesitant to take meds at times because he thinks that his guardian is in total control, reassurance that his guardian is not controlling his meds is usually effective    Family present/updated? No    Location of patient's belongings: In pt room    Critical Care Minutes:  Does the patient need critical care minutes documented? No

## 2024-05-24 NOTE — ED NOTES
Pt provided with saltines, rhiannon crackers, and soda. Declines further needs at this time.    Completed Therapy?: No

## 2024-05-24 NOTE — ED NOTES
"Writer went in pt's room to give HS seroquel. Pt hesitated at first and asked why there were three pills. Writer explained the total dose is 150 mg. Pt hesitated for a moment and stated that \"he's never taken that high of a dose\". Pt also asked if guardian was responsible for such a high dose. Writer educated pt that guardian can not make dosing decisions because those are determined by providers only. After contemplating for a few minutes, pt did agree to take the medication.   "
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
## 2024-05-24 NOTE — ED NOTES
"Meal tray delivered- Pt asking about going home. RN explained SW is looking for a crisis bed for pt and  waiting for [facility] to increase staffing ratio to be able to accommodate pt. Pt stated \"I am safer to stay in the hospital\".   "

## 2024-05-24 NOTE — ED NOTES
RN received a call from pt's psychiatrist, Kris Barker  Psych NP   ([phone]) since Nov in OP - NP reports needs to be eval for commitment no compliance with safety planning, meds, provocative statement to others, elopes, terrorist threats- Potentially harmful to self and others.   He reports alot of potential commitment- He is terminating care with pt d/t lack of follow up from pt .  Refusing all meds, untreated bipolar criteria, impulsive, grandiosity.  Pendng legal charges 2/2 threats to guardian.  Eloped, terrorists threats, to care staff at , guardian.  Lack of understanding of severity of actions.   Pt stated he would Obtain a firearm, shoot guardian and himself.   Not  a reliable source of info   Records from [facility]- Kris Barker  Psych NP   [phone]  - seen by other MD buck Zavala  Pt has had Multiple guardians in the past and make accusations against them and gotten rid of them  NP states he is willing to speak to anyone or send records - He is very concerned with the patients and the publics safety

## 2024-05-24 NOTE — ED NOTES
Numerous saltine wrappers on the floor and pt's table. Trash removed and discarded. More saltines and rhiannon crackers given to patient and breakfast order placed.

## 2024-05-24 NOTE — ED NOTES
Pt came out from his room, went to restroom. RN provided water, sprite and saltines as per pt request.

## 2024-05-24 NOTE — ED NOTES
RN woke pt and asked if pt could take medications now- Pt agreed, Amb to bathroom and brushed teeth.

## 2024-05-25 PROCEDURE — 250N000013 HC RX MED GY IP 250 OP 250 PS 637: Performed by: NURSE PRACTITIONER

## 2024-05-25 PROCEDURE — 250N000013 HC RX MED GY IP 250 OP 250 PS 637: Performed by: EMERGENCY MEDICINE

## 2024-05-25 RX ADMIN — Medication 25 MCG: at 09:44

## 2024-05-25 RX ADMIN — APIXABAN 5 MG: 5 TABLET, FILM COATED ORAL at 09:44

## 2024-05-25 RX ADMIN — QUETIAPINE FUMARATE 150 MG: 50 TABLET, EXTENDED RELEASE ORAL at 21:09

## 2024-05-25 RX ADMIN — LISINOPRIL 5 MG: 5 TABLET ORAL at 09:44

## 2024-05-25 RX ADMIN — APIXABAN 5 MG: 5 TABLET, FILM COATED ORAL at 21:09

## 2024-05-25 RX ADMIN — METHYLPHENIDATE HYDROCHLORIDE 36 MG: 36 TABLET, EXTENDED RELEASE ORAL at 09:44

## 2024-05-25 RX ADMIN — SERTRALINE HYDROCHLORIDE 150 MG: 100 TABLET ORAL at 09:43

## 2024-05-25 RX ADMIN — TOLTERODINE TARTRATE 4 MG: 4 CAPSULE, EXTENDED RELEASE ORAL at 10:03

## 2024-05-25 RX ADMIN — QUETIAPINE FUMARATE 150 MG: 50 TABLET, EXTENDED RELEASE ORAL at 00:02

## 2024-05-25 NOTE — ED NOTES
Lunch brought into pt, pt calm and cooperative with staff so far. Pt smiling and saying thank you when handed lunch tray.

## 2024-05-25 NOTE — ED NOTES
----- Message from Nannette Guerrero sent at 5/13/2024  2:18 PM CDT -----  Regarding: Pt returned call  Contact: [phone]  Type:  Patient Returning Call        Who Called:GERARDO HAYNES [[id number]]        Who Left Message for Patient:Kody        Does the patient know what this is regarding?Pt returned call regarding sche for ultrasound. Please advise         Best Call Back Number:Telephone Information:  Mobile          [phone]  Mobile          [phone]            Additional Information:   RN ED Mental Health Handoff Note    DK    Does patient require 1:1? Yes    Hold and rights been given and documented for patient: Yes    Is the patient in  scrubs? Yes    Has the patient been searched? Yes    Is the 15 minute observation tool up to date? Yes    Was patient issued a welcome folder? Yes    Room check completed this shift: Yes    PSS3 and [place] Assessment/Reassessment this shift:    C-SSRS ([place])      Date and Time Q1 Wished to be Dead (Past Month) Q2 Suicidal Thoughts (Past Month) Q3 Suicidal Thought Method Q4 Suicidal Intent without Specific Plan Q5 Suicide Intent with Specific Plan Q6 Suicide Behavior (Lifetime) Within the Past 3 Months? Level of Risk per Screen Level of Risk per Screen User   05/22/24 1750 0-->no 0-->no -- -- -- -- -- -- no risks indicated BAQ   05/22/24 1631 0-->no 0-->no -- -- -- 0-->no -- -- no risks indicated RAT   05/22/24 1528 0-->no 0-->no -- -- -- 0-->no -- -- no risks indicated VENKATESH            Behavioral status of patient: Green    Code 21 called this shift? No    Use of restraints/seclusion this shift? No    Most recent vital signs:  Temp: 98.9  F (37.2  C) Temp src: Oral BP: 123/81 Pulse: 97   Resp: 18 SpO2: 98 % O2 Device: None (Room air)      Medications:  Scheduled medication compliance? Yes    PRN Meds administered this shift? No    Medications   acetaminophen (TYLENOL) tablet 650 mg (has no administration in time range)   LORazepam (ATIVAN) tablet 1 mg (has no administration in time range)   OLANZapine zydis (zyPREXA) ODT tab 10 mg (has no administration in time range)   melatonin tablet 3 mg (has no administration in time range)   hydrOXYzine HCl (ATARAX) tablet 25 mg (has no administration in time range)   albuterol (PROVENTIL HFA/VENTOLIN HFA) inhaler (has no administration in time range)   apixaban ANTICOAGULANT (ELIQUIS) tablet 5 mg (5 mg Oral $Given 5/24/24 2043)   lisinopril (ZESTRIL) tablet 5 mg (5 mg Oral $Given 5/24/24 4944)   methylphenidate HCl  ER (OSM) (CONCERTA) CR tablet 36 mg (36 mg Oral Not Given 5/24/24 1526)   sertraline (ZOLOFT) tablet 150 mg (150 mg Oral $Given 5/24/24 1358)   tolterodine ER (DETROL LA) 24 hr capsule 4 mg (4 mg Oral $Given 5/24/24 1341)   Vitamin D3 (CHOLECALCIFEROL) tablet 25 mcg (25 mcg Oral $Given 5/24/24 1359)   QUEtiapine (SEROquel XR) 24 hr tablet 150 mg (150 mg Oral $Given 5/25/24 0002)         ADLs    Meal Provided this shift? Yes    Hygiene items provided? Yes    ADLs completed? Yes    Date of last shower: Prior to admission    Any significant events this shift? No    Any information that would be helpful in caring for this patient?      Family present/updated? Yes    Location of patient's belongings: In patient room and DEC office    Critical Care Minutes:  Does the patient need critical care minutes documented? No

## 2024-05-25 NOTE — ED NOTES
RN ED Mental Health Handoff Note    DK    Does patient require 1:1? Yes    Hold and rights been given and documented for patient: Yes    Is the patient in  scrubs? Yes    Has the patient been searched? Yes    Is the 15 minute observation tool up to date? Yes    Was patient issued a welcome folder? Yes    Room check completed this shift: Yes    PSS3 and [place] Assessment/Reassessment this shift:    C-SSRS ([place])      Date and Time Q1 Wished to be Dead (Past Month) Q2 Suicidal Thoughts (Past Month) Q3 Suicidal Thought Method Q4 Suicidal Intent without Specific Plan Q5 Suicide Intent with Specific Plan Q6 Suicide Behavior (Lifetime) Within the Past 3 Months? Level of Risk per Screen Level of Risk per Screen User   05/22/24 1750 0-->no 0-->no -- -- -- -- -- -- no risks indicated BAQ   05/22/24 1631 0-->no 0-->no -- -- -- 0-->no -- -- no risks indicated RAT   05/22/24 1528 0-->no 0-->no -- -- -- 0-->no -- -- no risks indicated VENKATESH            Behavioral status of patient: Green    Code 21 called this shift? No    Use of restraints/seclusion this shift? No    Most recent vital signs:  Temp: 98.5  F (36.9  C) Temp src: Oral BP: 121/78 Pulse: 103   Resp: 18 SpO2: 100 % O2 Device: None (Room air)      Medications:  Scheduled medication compliance? Yes    PRN Meds administered this shift? No    Medications   acetaminophen (TYLENOL) tablet 650 mg (has no administration in time range)   LORazepam (ATIVAN) tablet 1 mg (has no administration in time range)   OLANZapine zydis (zyPREXA) ODT tab 10 mg (has no administration in time range)   melatonin tablet 3 mg (has no administration in time range)   hydrOXYzine HCl (ATARAX) tablet 25 mg (has no administration in time range)   albuterol (PROVENTIL HFA/VENTOLIN HFA) inhaler (has no administration in time range)   apixaban ANTICOAGULANT (ELIQUIS) tablet 5 mg (5 mg Oral $Given 5/24/24 8314)   lisinopril (ZESTRIL) tablet 5 mg (5 mg Oral $Given 5/24/24 1352)   methylphenidate  HCl ER (OSM) (CONCERTA) CR tablet 36 mg (36 mg Oral Not Given 5/24/24 1526)   sertraline (ZOLOFT) tablet 150 mg (150 mg Oral $Given 5/24/24 1358)   tolterodine ER (DETROL LA) 24 hr capsule 4 mg (4 mg Oral $Given 5/24/24 1341)   Vitamin D3 (CHOLECALCIFEROL) tablet 25 mcg (25 mcg Oral $Given 5/24/24 1359)   QUEtiapine (SEROquel XR) 24 hr tablet 150 mg (150 mg Oral $Given 5/23/24 2331)         ADLs    Meal Provided this shift? Yes    Hygiene items provided? Yes    ADLs completed? Yes    Date of last shower: Unknown    Any significant events this shift? No    Any information that would be helpful in caring for this patient?      Family present/updated? No    Location of patient's belongings: With pt and in closet    Critical Care Minutes:  Does the patient need critical care minutes documented? No

## 2024-05-26 PROCEDURE — 250N000013 HC RX MED GY IP 250 OP 250 PS 637: Performed by: EMERGENCY MEDICINE

## 2024-05-26 PROCEDURE — 250N000013 HC RX MED GY IP 250 OP 250 PS 637: Performed by: NURSE PRACTITIONER

## 2024-05-26 RX ADMIN — METHYLPHENIDATE HYDROCHLORIDE 36 MG: 36 TABLET, EXTENDED RELEASE ORAL at 09:50

## 2024-05-26 RX ADMIN — APIXABAN 5 MG: 5 TABLET, FILM COATED ORAL at 09:49

## 2024-05-26 RX ADMIN — SERTRALINE HYDROCHLORIDE 150 MG: 100 TABLET ORAL at 09:50

## 2024-05-26 RX ADMIN — APIXABAN 5 MG: 5 TABLET, FILM COATED ORAL at 20:16

## 2024-05-26 RX ADMIN — QUETIAPINE FUMARATE 150 MG: 50 TABLET, EXTENDED RELEASE ORAL at 22:52

## 2024-05-26 RX ADMIN — Medication 25 MCG: at 09:51

## 2024-05-26 RX ADMIN — LISINOPRIL 5 MG: 5 TABLET ORAL at 09:51

## 2024-05-26 RX ADMIN — TOLTERODINE TARTRATE 4 MG: 4 CAPSULE, EXTENDED RELEASE ORAL at 09:50

## 2024-05-26 NOTE — ED NOTES
Pt amb to bathroom and brushed teeth. Pt declined non- skid socks for feet. RN offered  pt shower, pt accepted.   ED tech and security escorted pt to shower. RN cleaned pt room and provided fresh linen.

## 2024-05-26 NOTE — ED NOTES
Pt assisted out of room down to bathroom. Declined meal tray, saltines were given. Pt spoke about guardian, was pleasant to RN, and now rest in bed.

## 2024-05-26 NOTE — ED NOTES
RN ED Mental Health Handoff Note    DK    Does patient require 1:1? Yes    Hold and rights been given and documented for patient: Yes    Is the patient in  scrubs? Yes    Has the patient been searched? Yes    Is the 15 minute observation tool up to date? Yes    Was patient issued a welcome folder? Yes    Room check completed this shift: Yes    PSS3 and [place] Assessment/Reassessment this shift:    C-SSRS ([place])      Date and Time Q1 Wished to be Dead (Past Month) Q2 Suicidal Thoughts (Past Month) Q3 Suicidal Thought Method Q4 Suicidal Intent without Specific Plan Q5 Suicide Intent with Specific Plan Q6 Suicide Behavior (Lifetime) Within the Past 3 Months? Level of Risk per Screen Level of Risk per Screen User   05/22/24 1750 0-->no 0-->no -- -- -- -- -- -- no risks indicated BAQ   05/22/24 1631 0-->no 0-->no -- -- -- 0-->no -- -- no risks indicated RAT   05/22/24 1528 0-->no 0-->no -- -- -- 0-->no -- -- no risks indicated VENKATESH            Behavioral status of patient: Green    Code 21 called this shift? No    Use of restraints/seclusion this shift? No    Most recent vital signs:  Temp: 96.9  F (36.1  C) Temp src: Temporal BP: 106/55 Pulse: 91   Resp: 18 SpO2: 98 % O2 Device: None (Room air)      Medications:  Scheduled medication compliance? Yes    PRN Meds administered this shift? No    Medications   acetaminophen (TYLENOL) tablet 650 mg (has no administration in time range)   LORazepam (ATIVAN) tablet 1 mg (has no administration in time range)   OLANZapine zydis (zyPREXA) ODT tab 10 mg (has no administration in time range)   melatonin tablet 3 mg (has no administration in time range)   hydrOXYzine HCl (ATARAX) tablet 25 mg (has no administration in time range)   albuterol (PROVENTIL HFA/VENTOLIN HFA) inhaler (has no administration in time range)   apixaban ANTICOAGULANT (ELIQUIS) tablet 5 mg (5 mg Oral $Given 5/25/24 2109)   lisinopril (ZESTRIL) tablet 5 mg (5 mg Oral $Given 5/25/24 0944)   methylphenidate  HCl ER (OSM) (CONCERTA) CR tablet 36 mg (36 mg Oral $Given 5/25/24 0991)   sertraline (ZOLOFT) tablet 150 mg (150 mg Oral $Given 5/25/24 0943)   tolterodine ER (DETROL LA) 24 hr capsule 4 mg (4 mg Oral $Given 5/25/24 1003)   Vitamin D3 (CHOLECALCIFEROL) tablet 25 mcg (25 mcg Oral $Given 5/25/24 0912)   QUEtiapine (SEROquel XR) 24 hr tablet 150 mg (150 mg Oral $Given 5/25/24 8819)         ADLs    Meal Provided this shift? Yes    Hygiene items provided? Yes    ADLs completed? Yes    Date of last shower: unknown    Any significant events this shift? No    Any information that would be helpful in caring for this patient?  no    Family present/updated? No    Location of patient's belongings: DEC office    Critical Care Minutes:  Does the patient need critical care minutes documented? No

## 2024-05-26 NOTE — ED NOTES
RN ED Mental Health Handoff Note    72 hour hold    Does patient require 1:1? Yes    Hold and rights been given and documented for patient: Yes    Is the patient in BH scrubs? Yes    Has the patient been searched? Yes    Is the 15 minute observation tool up to date? Yes    Was patient issued a welcome folder? Yes    Room check completed this shift: Yes    PSS3 and [place] Assessment/Reassessment this shift:    C-SSRS ([place])      Date and Time Q1 Wished to be Dead (Past Month) Q2 Suicidal Thoughts (Past Month) Q3 Suicidal Thought Method Q4 Suicidal Intent without Specific Plan Q5 Suicide Intent with Specific Plan Q6 Suicide Behavior (Lifetime) Within the Past 3 Months? Level of Risk per Screen Level of Risk per Screen User   05/22/24 1750 0-->no 0-->no -- -- -- -- -- -- no risks indicated BAQ   05/22/24 1631 0-->no 0-->no -- -- -- 0-->no -- -- no risks indicated RAT   05/22/24 1528 0-->no 0-->no -- -- -- 0-->no -- -- no risks indicated VENKATESH            Behavioral status of patient: Green    Code 21 called this shift? No    Use of restraints/seclusion this shift? No    Most recent vital signs:  Temp: 96.9  F (36.1  C) Temp src: Temporal BP: 106/55       SpO2: 98 % O2 Device: None (Room air)      Medications:  Scheduled medication compliance? Yes    PRN Meds administered this shift? No    Medications   acetaminophen (TYLENOL) tablet 650 mg (has no administration in time range)   LORazepam (ATIVAN) tablet 1 mg (has no administration in time range)   OLANZapine zydis (zyPREXA) ODT tab 10 mg (has no administration in time range)   melatonin tablet 3 mg (has no administration in time range)   hydrOXYzine HCl (ATARAX) tablet 25 mg (has no administration in time range)   albuterol (PROVENTIL HFA/VENTOLIN HFA) inhaler (has no administration in time range)   apixaban ANTICOAGULANT (ELIQUIS) tablet 5 mg (5 mg Oral $Given 5/26/24 1241)   lisinopril (ZESTRIL) tablet 5 mg (5 mg Oral $Given 5/26/24 0983)   methylphenidate HCl ER  (OSM) (CONCERTA) CR tablet 36 mg (36 mg Oral $Given 5/26/24 0950)   sertraline (ZOLOFT) tablet 150 mg (150 mg Oral $Given 5/26/24 0950)   tolterodine ER (DETROL LA) 24 hr capsule 4 mg (4 mg Oral $Given 5/26/24 0950)   Vitamin D3 (CHOLECALCIFEROL) tablet 25 mcg (25 mcg Oral $Given 5/26/24 0951)   QUEtiapine (SEROquel XR) 24 hr tablet 150 mg (150 mg Oral $Given 5/25/24 6938)         ADLs    Meal Provided this shift? No- Pt declined dinner    Hygiene items provided? Yes    ADLs completed? Yes    Date of last shower: 5/26/24     Any significant events this shift? No    Any information that would be helpful in caring for this patient?  No    Family present/updated? No    Location of patient's belongings: IN closet and pt's room    Critical Care Minutes:  Does the patient need critical care minutes documented? No

## 2024-05-26 NOTE — ED NOTES
"Pt amb to bathroom. Pt declines making order for dinner. Pt reports doing well. Pt stating he is \"ready to go home\". Pt went back to room and made bed. Pt requesting warm blankets.  "

## 2024-05-26 NOTE — ED NOTES
Rn asked pt if he would like a meal, pt declined. Stated he would let RN know when he would like to eat.

## 2024-05-26 NOTE — ED NOTES
Pt given multiple saltines and gram crackers. Resting comfortably in bed singing, listening to music, and watching his phone. Make needs known.

## 2024-05-27 VITALS
DIASTOLIC BLOOD PRESSURE: 93 MMHG | WEIGHT: 186.07 LBS | HEIGHT: 74 IN | OXYGEN SATURATION: 100 % | HEART RATE: 122 BPM | BODY MASS INDEX: 23.88 KG/M2 | RESPIRATION RATE: 18 BRPM | TEMPERATURE: 97.9 F | SYSTOLIC BLOOD PRESSURE: 131 MMHG

## 2024-05-27 PROCEDURE — 250N000013 HC RX MED GY IP 250 OP 250 PS 637: Performed by: EMERGENCY MEDICINE

## 2024-05-27 RX ADMIN — Medication 25 MCG: at 08:48

## 2024-05-27 RX ADMIN — SERTRALINE HYDROCHLORIDE 150 MG: 100 TABLET ORAL at 08:48

## 2024-05-27 RX ADMIN — LISINOPRIL 5 MG: 5 TABLET ORAL at 08:48

## 2024-05-27 RX ADMIN — APIXABAN 5 MG: 5 TABLET, FILM COATED ORAL at 08:47

## 2024-05-27 RX ADMIN — TOLTERODINE TARTRATE 4 MG: 4 CAPSULE, EXTENDED RELEASE ORAL at 08:48

## 2024-05-27 RX ADMIN — METHYLPHENIDATE HYDROCHLORIDE 36 MG: 36 TABLET, EXTENDED RELEASE ORAL at 08:48

## 2024-05-27 ASSESSMENT — ACTIVITIES OF DAILY LIVING (ADL)
ADLS_ACUITY_SCORE: 35

## 2024-05-27 NOTE — PROGRESS NOTES
"Triage & Transition Services, Extended Care       Patient: Nick goes by \"Nick,\" uses he/him pronouns  Date of Service: May 27, 2024  Site of Service: [facility] EMERGENCY DEPT                             [id number]  Patient was seen yes Virtual: AmWell  Mode of Assessment: Virtual: AmWell    Patient is followed related to: boarding status  Notable observations from today's encounter include: n/a  The care team is working towards the following: Learn and Demonstrate at Least One Skill Focused on Crisis Stabilization  Significant status changes: no  Case Management included: Case Management Included: collaborating with patient's support system, skills work  Details on Collaborating with Patient's Support System: Writer left vm's for Magda arriaza, pt's [facility] legal guardian ([phone]), AMARJIT , Marija Zabala ([phone]), and [facility] (Sanchez [phone] inquiring about discharge planning.    12:45 PM: [facility] [phone]. Writer inquired about the plan of getting 2:1 staffing so patient can return home. Sanchez said he will see if he can find the staffing and will call writer back. Writer explained that as of Friday 5/24 afternoon, the AMARJIT  and guardian were on board with the discharge plan either to crisis residence or back home with 2:1 staffing. Sanchez will call writer back if he can get the staffing together for a discharge today.    1:30 PM: Writer received call back from [facility], he has found 2 staff for tonight and then he will work with guardian and AMARJIT  on a longer term plan for patient. Writer told Sanchez that patient is ready to go and that our staff did safety planning with patient. Sanchez says he or another staff will be arriving to get Nick around 3 or 3:30 PM. Writer left a vm for both Magda Arriaza and AMARJIT Marija to alert " them that the patient will be discharging back to home with  staff today.    Details on skills work: reframing, goal setting  Summary of Interaction: Writer met with patient via INFOGR[employer]HI[employer] cart. Patient was sitting up in bed in the dark during meeting. Patient was engaged, grandiose at times, and comfortable expressing frustrations. Writer offered active listening and validation to patient while they expressed frustrations with guardian, being brought to the hospital, and parents. Patient went on to talk about how they hope their story ends up in the news and that they are writing a book and plan to publish it. Patient asked writer multiple times if I think they have a good court case, writer simply responds that that is out my scope of practice and I am unable to discuss any legal matters. Patient was agreeable and continued on expressing frustrations with the disability services at [facility]. Writer attempted to redirect with no luck. Writer reiterated that our team is working to get patient back home and that our main concern is patient safety and health. Patient applauded hospital staff and thanked writer for all the hard work staff have been doing. Writer thanked patient for their time.    Recommendations: Final Disposition / Recommended Care Path: social placement boarding  Plan for Care reviewed with assigned Medical Provider: no  Plan for Care Team Review: RN, provider  Comments: n/a  Patient and/or validated legal guardian concurs: yes      Adilia Saba   [facility]  [phone]

## 2024-05-27 NOTE — ED NOTES
This is a patient who has been seen in the ER for many hours.  There was an issue with the group home and staffing.  We were waiting until their staffing was adequate.  They reported they needed to do 1 staffing that has been achieved and they did come to the ER to pick the patient up and he was discharged.     Zoltan Rendon MD  05/27/24 0799

## 2024-05-27 NOTE — ED NOTES
"Pt returned from shower with staff. Pt stating he feels the state will \"press charges against his guardian\".   "

## 2024-05-27 NOTE — ED NOTES
RN ED Mental Health Handoff Note    72 hour hold    Does patient require 1:1? Yes    Hold and rights been given and documented for patient: Yes    Is the patient in BH scrubs? Yes    Has the patient been searched? Yes    Is the 15 minute observation tool up to date? Yes    Was patient issued a welcome folder? Yes    Room check completed this shift: Yes    PSS3 and [place] Assessment/Reassessment this shift:    C-SSRS ([place])      Date and Time Q1 Wished to be Dead (Past Month) Q2 Suicidal Thoughts (Past Month) Q3 Suicidal Thought Method Q4 Suicidal Intent without Specific Plan Q5 Suicide Intent with Specific Plan Q6 Suicide Behavior (Lifetime) Within the Past 3 Months? Level of Risk per Screen Level of Risk per Screen User   05/22/24 1750 0-->no 0-->no -- -- -- -- -- -- no risks indicated BAQ   05/22/24 1631 0-->no 0-->no -- -- -- 0-->no -- -- no risks indicated RAT   05/22/24 1528 0-->no 0-->no -- -- -- 0-->no -- -- no risks indicated VENKATESH            Behavioral status of patient: Green    Code 21 called this shift? No    Use of restraints/seclusion this shift? No    Most recent vital signs:  Temp: 97.9  F (36.6  C) Temp src: Temporal BP: 115/81     Resp: 18 SpO2: 99 % O2 Device: None (Room air)      Medications:  Scheduled medication compliance? Yes    PRN Meds administered this shift? No    Medications   acetaminophen (TYLENOL) tablet 650 mg (has no administration in time range)   LORazepam (ATIVAN) tablet 1 mg (has no administration in time range)   OLANZapine zydis (zyPREXA) ODT tab 10 mg (has no administration in time range)   melatonin tablet 3 mg (has no administration in time range)   hydrOXYzine HCl (ATARAX) tablet 25 mg (has no administration in time range)   albuterol (PROVENTIL HFA/VENTOLIN HFA) inhaler (has no administration in time range)   apixaban ANTICOAGULANT (ELIQUIS) tablet 5 mg (5 mg Oral $Given 5/26/24 2016)   lisinopril (ZESTRIL) tablet 5 mg (5 mg Oral $Given 5/26/24 0951)   methylphenidate  HCl ER (OSM) (CONCERTA) CR tablet 36 mg (36 mg Oral $Given 5/26/24 0950)   sertraline (ZOLOFT) tablet 150 mg (150 mg Oral $Given 5/26/24 0950)   tolterodine ER (DETROL LA) 24 hr capsule 4 mg (4 mg Oral $Given 5/26/24 0950)   Vitamin D3 (CHOLECALCIFEROL) tablet 25 mcg (25 mcg Oral $Given 5/26/24 0951)   QUEtiapine (SEROquel XR) 24 hr tablet 150 mg (150 mg Oral $Given 5/26/24 8700)         ADLs    Meal Provided this shift? Yes    Hygiene items provided? Yes    ADLs completed? Yes    Date of last shower: 5-26-24    Any significant events this shift? No    Any information that would be helpful in caring for this patient?      Family present/updated? Yes    Location of patient's belongings: In DEC office and phone and phone  in room    Critical Care Minutes:  Does the patient need critical care minutes documented? No

## 2024-05-27 NOTE — DISCHARGE INSTRUCTIONS
"   Aftercare Plan    > Follow up with your primary care provider    > Follow up with your psychiatric medication management provider: Rena    > Work with your [facility] staff for support       SEE ADDITIONAL PAGE FOR YOUR PERSONALIZED SAFETY PLAN        If I am feeling unsafe or I am in a crisis, I will:   Contact my established care providers   Call the [place] Lifeline 988   Go to the nearest emergency room   Call 911   Your [facility] has a mental health crisis team you can call 24/7: [facility] [phone]; [facility] [phone]       Crisis Text Line  Text 660630  You will be connected with a trained live crisis counselor to provide support.     Por espanol, texto  RAEGAN a [id number] o texto a 442-AYUDAME en [facility] Warm Line  Peer to peer support  Monday thru Saturday, 12 pm to 10 pm  [phone] or [phone]  Text \"Support\" to 83030     [employer] [place] [employer] Mental Illness (JESSICA)  034.275.4431 or 1.888.JESSICA.HELPS        Mental Health Apps  My3  https://Airizu.org/     VirtualHopeBox  https://[employer]org/apps/virtual-hope-box/    Additional Information  Today you were seen by a licensed mental health professional through Triage and Transition services, Behavioral Healthcare Providers ([facility])  for a crisis assessment in the Emergency Department at [facility].  It is recommended that you follow up with your established providers (psychiatrist, mental health therapist, and/or primary care doctor - as relevant) as soon as possible. Coordinators from [facility] will be calling you in the next 24-48 hours to ensure that you have the resources you need.  You can also contact [facility] coordinators directly at [phone]. You may have been scheduled for or offered an appointment with a mental health provider. [facility] maintains an extensive network of licensed behavioral health providers to connect patients with the services they need.  We do not charge providers a fee to " participate in our referral network.  We match patients with providers based on a patient's specific needs, insurance coverage, and location.  Our first effort will be to refer you to a provider within your care system, and will utilize providers outside your care system as needed.

## 2024-05-31 ENCOUNTER — TELEPHONE (OUTPATIENT)
Dept: BEHAVIORAL HEALTH | Facility: CLINIC | Age: 24
End: 2024-05-31
Payer: COMMERCIAL

## 2024-05-31 NOTE — TELEPHONE ENCOUNTER
Through self-referral, patient's guardian called into the Transition Clinic. Spoke to patient's legal guardian regarding Transition Clinic referral.   Scheduled patient for Transition Clinic services on 6/4 and 6/7. Intake forms sent to patient via email.. Tracker form completed.      Hope Young  Transition Clinic Coordinator  05/31/24 3:59 PM

## 2024-06-03 ENCOUNTER — TELEPHONE (OUTPATIENT)
Dept: BEHAVIORAL HEALTH | Facility: CLINIC | Age: 24
End: 2024-06-03
Payer: COMMERCIAL

## 2024-06-03 NOTE — TELEPHONE ENCOUNTER
Patient called TC and stated unable to make appointment tomorrow. Asked who made the appointment and coordinator informed that legal guardian made appointment.     Patient reported that legal guardian made appointments behind his back. Is planning on going back to court with guardian later this month.    Vania Sahu  Transition Clinic Coordinator  06/03/24 12:37 PM

## 2024-07-18 ENCOUNTER — HOSPITAL ENCOUNTER (EMERGENCY)
Facility: CLINIC | Age: 24
Discharge: HOME | End: 2024-07-18
Attending: EMERGENCY MEDICINE | Admitting: EMERGENCY MEDICINE
Payer: COMMERCIAL

## 2024-07-18 VITALS
TEMPERATURE: 97.9 F | OXYGEN SATURATION: 100 % | HEART RATE: 98 BPM | BODY MASS INDEX: 23.1 KG/M2 | RESPIRATION RATE: 18 BRPM | DIASTOLIC BLOOD PRESSURE: 88 MMHG | SYSTOLIC BLOOD PRESSURE: 132 MMHG | HEIGHT: 74 IN | WEIGHT: 180 LBS

## 2024-07-18 DIAGNOSIS — R45.86 EMOTIONAL LABILITY: ICD-10-CM

## 2024-07-18 PROCEDURE — 250N000013 HC RX MED GY IP 250 OP 250 PS 637: Performed by: EMERGENCY MEDICINE

## 2024-07-18 PROCEDURE — 99283 EMERGENCY DEPT VISIT LOW MDM: CPT

## 2024-07-18 PROCEDURE — 250N000009 HC RX 250: Performed by: EMERGENCY MEDICINE

## 2024-07-18 RX ORDER — MAGNESIUM HYDROXIDE/ALUMINUM HYDROXICE/SIMETHICONE 120; 1200; 1200 MG/30ML; MG/30ML; MG/30ML
15 SUSPENSION ORAL ONCE
Status: COMPLETED | OUTPATIENT
Start: 2024-07-18 | End: 2024-07-18

## 2024-07-18 RX ORDER — LIDOCAINE HYDROCHLORIDE 20 MG/ML
15 SOLUTION OROPHARYNGEAL ONCE
Status: COMPLETED | OUTPATIENT
Start: 2024-07-18 | End: 2024-07-18

## 2024-07-18 RX ADMIN — ALUMINUM HYDROXIDE, MAGNESIUM HYDROXIDE, AND SIMETHICONE 15 ML: 200; 200; 20 SUSPENSION ORAL at 21:16

## 2024-07-18 RX ADMIN — LIDOCAINE HYDROCHLORIDE 15 ML: 20 SOLUTION ORAL at 21:16

## 2024-07-18 ASSESSMENT — ACTIVITIES OF DAILY LIVING (ADL)
ADLS_ACUITY_SCORE: 35

## 2024-07-19 ENCOUNTER — TELEPHONE (OUTPATIENT)
Dept: BEHAVIORAL HEALTH | Facility: CLINIC | Age: 24
End: 2024-07-19
Payer: COMMERCIAL

## 2024-07-19 NOTE — ED TRIAGE NOTES
Pt arrived by EMS for Mental health problem/SI, per report, pt called PD stating he had a knife and wanted to cut himself. EMS reports pt has not been taking his medication in 3 days, PD was shown a video of pt recorded video walking around with knife and pretending to cut his throat.     Per pt, he's here voluntary due to heart condition, he's been under a lot of stress related to his Aug 1st guardianship court date, school and the trump situation. Denies current and hx of SI, currently calm and cooperative. Searched, belongings put in DEC office.

## 2024-07-19 NOTE — TELEPHONE ENCOUNTER
Phone number verified in [facility]. [facility] for guardian regarding follow up, left DEC number if they would like additional assistance.  No further follow-up is needed.

## 2024-07-19 NOTE — ED PROVIDER NOTES
"  Emergency Department Note      History of Present Illness     Chief Complaint   Mental Health Problem    HPI   Octavio Kirkpatrick is a 23 year old male with a history of PE and MANA who presents to the ER for mental health issues. Police report the patient having wandered away from his group home where he is by himself in his living quarters to a [facility] where he called the police saying that he had a knife and wanted to cut himself. They state that he put the knife to his throat and said \"I'm done, it's over.\" Patient reports feeling overwhelmed today and having stopped his medicine, Seroquel, a few days ago describing a loss of energy after taking it and states that it does not work. He notes that although he said he wanted to cut himself, he did not actually intend to do so. Nick endorses bad abdominal pain starting today as well as sweating and feeling like he was having a MI. Patient denies suicidal thoughts, thoughts of harming himself or others, hallucinations, disease symptoms, or use of tobacco or alcohol.     Independent Historian   Police provided a partial history as detailed above.     Review of External Notes   Multiple ED visits in 2024 for behavioral issues    Past Medical History     Medical History and Problem List   ADHD  Anxiety  Asthma  Autistic disorder  Depressive disorder  Fetal alcohol syndrome  Hernia  PE  MANA    Medications   Albuterol   Apixaban   Lisinopril   Methylphenidate   Sertraline   Phenazopyridine   Quetiapine  Sulfamethoxazole   Warfarin   Meloxicam   Eliquis   Hydroxyzine   Concerta   Oxybutynin     Surgical History   Inguinal hernia repair   PE tubes   Tonsillectomy   Adenoidectomy  [place] teeth extraction   Tympanostomy   Physical Exam     Patient Vitals for the past 24 hrs:   BP Temp Temp src Pulse Resp SpO2 Height Weight   07/18/24 2218 -- -- -- -- 18 -- -- --   07/18/24 2029 (!) 140/82 97.9  F (36.6  C) Oral 99 -- 100 % 1.88 m (6' 2\") 81.6 kg (180 lb)     Physical " Exam  Constitutional: Well developed, nontox appearance  Head: Atraumatic.   Neck:  no stridor  Eyes: no scleral icterus  Cardiovascular: RRR, 2+ right radial pulse  Pulmonary/Chest: nml resp effort  Ext: Warm, well perfused, no edema  Neurological: A&O, symmetric facies, moves ext x4  Skin: Skin is warm and dry.   Psychiatric: Denies SI/HI, does not appear to be responding to internal stimuli, calm and cooperative  Nursing note and vitals reviewed.    Diagnostics     Lab Results   Labs Ordered and Resulted from Time of ED Arrival to Time of ED Departure - No data to display    Imaging   No orders to display     Independent Interpretation   None    ED Course      Medications Administered   Medications   alum & mag hydroxide-simethicone (MAALOX) suspension 15 mL (15 mLs Oral $Given 7/18/24 2116)   lidocaine (viscous) (XYLOCAINE) 2 % solution 15 mL (15 mLs Mouth/Throat $Given 7/18/24 2116)       Discussion of Management   DEC    ED Course   ED Course as of 07/18/24 2251   Thu Jul 18, 2024 2105 I obtained the history and examined the patient as noted above.        Optional/Additional Documentation  None    Medical Decision Making / Diagnosis     CMS Diagnoses: None    MIPS       None    MDM   Octavio Kirkpatrick is a 23 year old male presenting with concern for possible suicidal ideation    DDx includes psychiatric disorder NOS, personality disorder NOS, malingering, suicidal ideation, fetal alcohol syndrome, emotional lability.  Given the patient's history and symptomatology, I think would be appropriate to have them evaluated by DEC for further recommendations.  DEC evaluated the patient in the emergency department and recommends that patient is likely safe for discharge.  Unfortunately DEC is unable to speak with the patient's guardian or staff at his group home after multiple attempts at trying to contact them.  Plan to monitor the patient in the emergency department overnight with reevaluation in the morning and  attempt to contact group home staff regarding them.  Patient subsequently signed out in stable condition awaiting reevaluation    Disposition   Care of the patient was transferred to my colleague Dr. Ornelas pending reevaluation by DEC, contact with group home staff regarding.     Diagnosis     ICD-10-CM    1. Emotional lability  R45.86            Discharge Medications   New Prescriptions    No medications on file         Scribe Disclosure:  I, Jelani Brown, am serving as a scribe at 9:12 PM on 7/18/2024 to document services personally performed by Zoltan Boyer MD based on my observations and the provider's statements to me.        Zoltan Boyer MD  07/18/24 0105

## 2024-07-19 NOTE — ED NOTES
RN ED Mental Health Handoff Note    Voluntary    Does patient require 1:1? Yes    Hold and rights been given and documented for patient: No    Is the patient in BH scrubs? No -Street clothes    Has the patient been searched? Yes    Is the 15 minute observation tool up to date? Yes    Was patient issued a welcome folder? No -N/A    Room check completed this shift: Yes    PSS3 and [place] Assessment/Reassessment this shift:    C-SSRS ([place])      Date and Time Q1 Wished to be Dead (Past Month) Q2 Suicidal Thoughts (Past Month) Q3 Suicidal Thought Method Q4 Suicidal Intent without Specific Plan Q5 Suicide Intent with Specific Plan Q6 Suicide Behavior (Lifetime) If yes to Q6, within past 3 months? Level of Risk per Screen Level of Risk per Screen User   07/18/24 2204 0-->no 0-->no -- -- -- -- -- -- no risks indicated [place]   07/18/24 2026 0-->no 0-->no -- -- -- 0-->no -- -- no risks indicated ANZ            Behavioral status of patient: Green    Code 21 called this shift? No    Use of restraints/seclusion this shift? No    Most recent vital signs:  Temp: 97.9  F (36.6  C) Temp src: Oral BP: (!) 140/82 Pulse: 99   Resp: 18 SpO2: 100 % O2 Device: None (Room air)      Medications:  Scheduled medication compliance? No    PRN Meds administered this shift? No    Medications   alum & mag hydroxide-simethicone (MAALOX) suspension 15 mL (15 mLs Oral $Given 7/18/24 2116)   lidocaine (viscous) (XYLOCAINE) 2 % solution 15 mL (15 mLs Mouth/Throat $Given 7/18/24 2116)         ADLs    Meal Provided this shift? No    Hygiene items provided? No    ADLs completed? No    Date of last shower: Unknown    Any significant events this shift? No    Any information that would be helpful in caring for this patient?  N/A    Family present/updated? No    Location of patient's belongings: DEC office    Critical Care Minutes:  Does the patient need critical care minutes documented? No

## 2024-07-19 NOTE — ED PROVIDER NOTES
I received patient in signout from Dr. Boyer.  Please refer to their complete H&P for further information.  Briefly, patient is a 24 yo male presenting with agitation from group home. Medically cleared and seen by DEC. No indication for emergent mental health hospitalization at this time. DEC was able to obtain collateral from [facility] who feels comfortable with patient returning to group [place] at this time. Return precautions given.          Nohemi Ornelas,   07/19/24 0251
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
## 2024-07-22 ENCOUNTER — HOSPITAL ENCOUNTER (EMERGENCY)
Facility: CLINIC | Age: 24
Discharge: HOME | End: 2024-07-22
Attending: EMERGENCY MEDICINE | Admitting: EMERGENCY MEDICINE
Payer: COMMERCIAL

## 2024-07-22 VITALS
HEART RATE: 100 BPM | DIASTOLIC BLOOD PRESSURE: 86 MMHG | OXYGEN SATURATION: 99 % | TEMPERATURE: 98.5 F | RESPIRATION RATE: 18 BRPM | SYSTOLIC BLOOD PRESSURE: 128 MMHG

## 2024-07-22 DIAGNOSIS — K62.89 RECTAL PAIN: ICD-10-CM

## 2024-07-22 PROCEDURE — 99282 EMERGENCY DEPT VISIT SF MDM: CPT

## 2024-07-22 ASSESSMENT — COLUMBIA-SUICIDE SEVERITY RATING SCALE - C-SSRS
6. HAVE YOU EVER DONE ANYTHING, STARTED TO DO ANYTHING, OR PREPARED TO DO ANYTHING TO END YOUR LIFE?: YES
2. HAVE YOU ACTUALLY HAD ANY THOUGHTS OF KILLING YOURSELF IN THE PAST MONTH?: NO
1. IN THE PAST MONTH, HAVE YOU WISHED YOU WERE DEAD OR WISHED YOU COULD GO TO SLEEP AND NOT WAKE UP?: NO

## 2024-07-22 NOTE — ED PROVIDER NOTES
Emergency Department Note      Code Status: No Order    History of Present Illness     Chief Complaint:  Rectal/perineal Pain     The history is provided by the patient.      Octavio Kirkpatrick is a 23 year old male with a history of MANA presenting with rectal soreness for the past couple days. Denies fever, vomiting, abdominal pain, or nausea. No history of similar symptoms or hemorrhoids. He endorses history of constipation that was more mild compared to his current symptoms. No recent injury.     Independent Historian:    None    Review of External Notes  Several recent ED provider notes for mental health related visits.    Past Medical History   Medical History, Surgical History, Problem List, and Medications  Reviewed in Epic    Physical Exam   Patient Vitals for the past 24 hrs:   BP Temp Pulse Resp SpO2   07/22/24 1252 128/86 -- -- -- --   07/22/24 1251 -- 98.5  F (36.9  C) 100 18 99 %       Physical Exam  Constitutional: Vital signs reviewed as above.   Eyes: PEERL, EOMI B/L  Neck: No JVD noted. FROM   Cardiovascular: tachycardic rate, Regular rhythm and normal heart sounds.  No murmur heard. Equal B/L peripheral pulses.  Pulmonary/Chest: Effort normal and breath sounds normal. No respiratory distress. Patient has no wheezes. Patient has no rales.   Gastrointestinal: Soft. There is no tenderness.   Rectal (Chaperoned)   No severe tenderness per patient   Normal tone   No gross blood   Brown colored stool noted   No external hemorrhoids noted   No anal fissures noted  Musculoskeletal/Extremities: No pitting edema noted. Normal tone.  Neurological: Alert  Skin: Skin is warm and dry. There is no diaphoresis noted.   Psychiatric: The patient appears calm.     Diagnostics     Laboratory: Imaging:   Labs Ordered and Resulted from Time of ED Arrival to Time of ED Departure - No data to display  No orders to display         Independent Interpretation  See ED course    ED Course    Medications  Administered  Medications - No data to display    Procedures  Procedures     Discussion of Management  See ED Course    ED Course  ED Course as of 07/22/24 1337   Mon Jul 22, 2024   1322 I obtained the history and examined the patient as noted above.          Optional/Additional Documentation: None    Medical Decision Making / Diagnosis     MIPS     None    Medical Decision Making:  This 23-year-old male presents to the ED due to rectal pain.  Please see the HPI and exam for specifics.  A broad differential was considered including hemorrhoids, anal fissures, perirectal/perianal abscess, etc.  After shared decision-making discussion with the patient, he would rather follow-up with his pelvic floor therapist instead of pursuing labs and CT imaging today.  His exam was, overall, reassuring and I think this is a reasonable next step in care.  He should also follow with primary care but may always return with new or worsening symptoms.    Anticipatory guidance given prior to discharge.      Critical Care:  None.    Disposition:  See ED Course and MDM    ICD-10 Codes:    ICD-10-CM    1. Rectal pain  K62.89            Discharge Medications:  New Prescriptions    No medications on file      Scribe Disclosure:  I, Farhana Andrews, am serving as a scribe at 1:26 PM on 7/22/2024 to document services personally performed by Wilbur Alvarez DO based on my observations and the provider's statements to me.    7/22/2024   Wilbur Alvarez DO     [employer]                    Wilbur Alvarez DO  07/22/24 9047

## 2024-07-22 NOTE — ED TRIAGE NOTES
"Pt presents to ED with a couple days of rectal pain. Pain is constant. Denies issues with going to the bathroom. Describes as \"sore or tender.\" Denies bleeding.      Triage Assessment (Adult)       Row Name 07/22/24 1251          Triage Assessment    Airway WDL WDL        Respiratory WDL    Respiratory WDL WDL                     "

## 2024-07-22 NOTE — ED TRIAGE NOTES
Triage Assessment (Adult)       Row Name 07/22/24 1251          Triage Assessment    Airway WDL WDL        Respiratory WDL    Respiratory WDL WDL

## 2024-07-30 ENCOUNTER — TRANSFERRED RECORDS (OUTPATIENT)
Dept: HEALTH INFORMATION MANAGEMENT | Facility: CLINIC | Age: 24
End: 2024-07-30
Payer: COMMERCIAL

## 2024-08-08 ENCOUNTER — HOSPITAL ENCOUNTER (EMERGENCY)
Facility: CLINIC | Age: 24
Discharge: NURSING, REHAB OR LONG-TERM CARE | End: 2024-08-08
Attending: EMERGENCY MEDICINE | Admitting: EMERGENCY MEDICINE
Payer: COMMERCIAL

## 2024-08-08 VITALS
TEMPERATURE: 98.7 F | HEART RATE: 71 BPM | RESPIRATION RATE: 18 BRPM | DIASTOLIC BLOOD PRESSURE: 60 MMHG | OXYGEN SATURATION: 99 % | SYSTOLIC BLOOD PRESSURE: 132 MMHG | HEIGHT: 74 IN | BODY MASS INDEX: 23.74 KG/M2 | WEIGHT: 185 LBS

## 2024-08-08 DIAGNOSIS — Z86.59 HISTORY OF AUTISM: ICD-10-CM

## 2024-08-08 DIAGNOSIS — R45.851 SUICIDAL IDEATION: ICD-10-CM

## 2024-08-08 DIAGNOSIS — R45.86 EMOTIONAL LABILITY: ICD-10-CM

## 2024-08-08 PROCEDURE — 99285 EMERGENCY DEPT VISIT HI MDM: CPT

## 2024-08-08 ASSESSMENT — ACTIVITIES OF DAILY LIVING (ADL)
ADLS_ACUITY_SCORE: 35

## 2024-08-08 ASSESSMENT — COLUMBIA-SUICIDE SEVERITY RATING SCALE - C-SSRS
2. HAVE YOU ACTUALLY HAD ANY THOUGHTS OF KILLING YOURSELF IN THE PAST MONTH?: NO
6. HAVE YOU EVER DONE ANYTHING, STARTED TO DO ANYTHING, OR PREPARED TO DO ANYTHING TO END YOUR LIFE?: YES
1. IN THE PAST MONTH, HAVE YOU WISHED YOU WERE DEAD OR WISHED YOU COULD GO TO SLEEP AND NOT WAKE UP?: NO

## 2024-08-08 NOTE — ED PROVIDER NOTES
Emergency Department Note      History of Present Illness     Chief Complaint   Suicidal      HPI   Octavio Kirkpatrick is a 23 year old male who presents after he was on an outing with his group home people lifetime he then went to [facility] about himself and night and threatened to hurt himself.  The patient is denying any suicidal ideation at this time.  He states that he is overwhelmed with family matters.  He states he has been at this group home for some time and would like to relocate to [place] where he has family.  The patient is still on anticoagulation due to wound PE DVT of unclear etiology in 2020.  He denies any acute medical complaints at this time.    Independent Historian   None    Review of External Notes   7/18/2024 the patient was brought to [facility] emergency room for mental health issues.  The patient resides in a group home.  He had wandered away to [facility] and called police stating that he wanted to cut himself when he had a knife.  He was feeling overwhelmed after having stopped his medications.  They did do a DEC assessment in the emergency room it was felt that he was not holdable and did not require emergent hospitalization he was discharged back to his group home.  He Th3 for car Nick on Eliquis    Past Medical History     Medical History and Problem List   Past Medical History:   Diagnosis Date    ADHD     Anxiety     Asthma 2000    Autistic disorder     Depressive disorder 2008    Fetal alcohol syndrome     Heart murmur     Hernia, abdominal        Medications   albuterol (PROAIR HFA/PROVENTIL HFA/VENTOLIN HFA) 108 (90 Base) MCG/ACT inhaler  apixaban ANTICOAGULANT (ELIQUIS) 5 MG tablet  lisinopril (ZESTRIL) 5 MG tablet  methylphenidate (CONCERTA) 36 MG CR tablet  Sertraline HCl (ZOLOFT PO)  tolterodine ER (DETROL LA) 4 MG 24 hr capsule  VITAMIN D, CHOLECALCIFEROL, PO        Surgical History   Past Surgical History:   Procedure Laterality Date    INGUINAL HERNIA REPAIR Bilateral     PE TUBES  "     TONSILLECTOMY & ADENOIDECTOMY      [place] teeth extraction         Physical Exam     Patient Vitals for the past 24 hrs:   BP Temp Temp src Pulse Resp SpO2 Height Weight   08/08/24 1748 (!) 151/86 98.7  F (37.1  C) Oral 74 20 97 % 1.88 m (6' 2\") 83.9 kg (185 lb)     Physical Exam    Physical Exam   Constitutional:  Patient is oriented to person, place, and time. They appear well-developed and well-nourished.    HENT:     Eyes:    Conjunctivae normal and EOM are normal. Pupils are equal, round, and reactive to light.   Neck:    Normal range of motion.    Musculoskeletal:  Normal range of motion. Patient exhibits no edema.   Neurological:   Patient is alert and oriented to person, place, and time. Patient has normal strength. No cranial nerve deficit or sensory deficit. GCS 15  Skin:   Skin is warm and dry. No rash noted. No erythema.   Psychiatric:   Patient is laughing and smiling throughout this interaction.  He denies to me any intention for self-harm.  He denies to me wanting to harm anyone else.      Diagnostics     Lab Results   Labs Ordered and Resulted from Time of ED Arrival to Time of ED Departure - No data to display    Imaging   No orders to display     Independent Interpretation   None    ED Course      Medications Administered   Medications - No data to display    Procedures   Procedures     Discussion of Management   Requested DEC assessment    ED Course        Additional Documentation  None    Medical Decision Making / Diagnosis     CMS Diagnoses: None    MIPS       None    MDM   Octavio Kirkpatrick is a 23 year old male with a history of autism coming in after he threatened to hurt himself today.  The patient currently denies that he has any desire to hurt himself.  He denies any acute medical issues.  This patient is somewhat impulsive given his history.  I am recommending a DEC assessment given his impulsivity will not send him over to empath.  He does live at a group home.  Will likely monitor " this patient overnight given the time of the day pending a DEC assessment.  Will sign out to my partner.    Disposition   Care of the patient was transferred to my colleague Dr. Padgett pending DEC.     Diagnosis     ICD-10-CM    1. Emotional lability  R45.86       2. History of autism  Z86.59            Discharge Medications   New Prescriptions    No medications on file         MD Martha Davis, Gabby Ag MD  08/08/24 3374

## 2024-08-08 NOTE — ED TRIAGE NOTES
Patient BIBA  on a police hold after running from [employer] in  to a [employer] where he purchased a knife and threatened to cut his throat. Patient resides at a group home in [place] and was out with family and staff today. Patient had similar episode approx 3 weeks ago at a [employer] grocery store. Patient denies SI upon arrival. Patient with hx of FAS, autism, depression, and PE, on eliquis. Patient calm and cooperative at this time.

## 2024-08-09 ENCOUNTER — TELEPHONE (OUTPATIENT)
Dept: BEHAVIORAL HEALTH | Facility: CLINIC | Age: 24
End: 2024-08-09
Payer: COMMERCIAL

## 2024-08-09 NOTE — ED NOTES
Introduced self to patient, patient was singing in his room. Offered food, drink, remote to TV but patient declines at this time. Tele-DEC assessment is in progress at this time.

## 2024-08-09 NOTE — ED NOTES
Called Tammy at [phone] to inform transport has arrived and is leaving with patient at this time.
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
## 2024-08-09 NOTE — ED NOTES
Called  number as provided [phone] to inform of plan to discharge patient. Call initially answered by [facility] staff member then call handed to  Supervisor Tammy. Informed of patient being discharged and asked if they provide transportation. Was informed they do not at this time. Informed them call is to confirm staff will be there to make sure patient is able to get inside. Informed staff would be available. Dr Padgett updated.

## 2024-08-09 NOTE — PLAN OF CARE
Octavio Kirkpatrick  August 8, 2024  Plan of Care Hand-off Note     Patient Care Path: (P) social placement boarding    Plan for Care:   Upon completion of assessment and in consultation with attending provider, the pt s circumstances and mental state appear to be appropriate for outpatient management. It is the recommendation of this clinician that pt discharge with OP MH support. Pt is not presenting as an acute risk to self or others as they are able to appropriately engage with clinician and make appropriate plans moving forward. Pt has been seen in the ED on several occasions with this same presentation and returns home. Pt denies suicidal ideation and reports that he would not harm himself even when he makes suicidal statements. Pt will continue working with his current OP team.    Identified Goals and Safety Issues: (P) Pt is to discharge when a group home staff is able to contacted and can  patient.    Overview:  (P)  [phone] (P) Herbert [phone]          Legal Status: Legal Status at Admission: Guardian/ad litum    Psychiatry Consult:       Updated   regarding plan of care.           Louann Gagnon, LICSW

## 2024-08-09 NOTE — TELEPHONE ENCOUNTER
Spoke with davida; [facility] is currently switching guardianship to another individual. Unable to make follow up care appointment. No further follow up needed. EmPATH number provided.
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
## 2024-08-15 ENCOUNTER — HOSPITAL ENCOUNTER (EMERGENCY)
Facility: CLINIC | Age: 24
Discharge: HOME | End: 2024-08-16
Attending: EMERGENCY MEDICINE | Admitting: EMERGENCY MEDICINE
Payer: COMMERCIAL

## 2024-08-15 VITALS
OXYGEN SATURATION: 99 % | RESPIRATION RATE: 20 BRPM | HEART RATE: 84 BPM | SYSTOLIC BLOOD PRESSURE: 147 MMHG | TEMPERATURE: 98.4 F | DIASTOLIC BLOOD PRESSURE: 92 MMHG

## 2024-08-15 DIAGNOSIS — R45.86 EMOTIONAL LABILITY: ICD-10-CM

## 2024-08-15 PROCEDURE — 99283 EMERGENCY DEPT VISIT LOW MDM: CPT

## 2024-08-16 NOTE — ED NOTES
Bed: [facility] [place]  Expected date:   Expected time:   Means of arrival:   Comments:  ED7

## 2024-08-16 NOTE — DISCHARGE INSTRUCTIONS
Represent for suicidal ideations, homicidal ideations. Your group hoe feels comfortable you returning this evening
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
## 2024-08-16 NOTE — ED PROVIDER NOTES
"  Emergency Department Note      History of Present Illness     Chief Complaint   Mental health evaluation    HPI   Octavio Kirkpatrick is a 23 year old male with a history as noted below who presents to the emergency department for mental health evaluation. EMS reports that tonight, the patient left his group home to go for a walk without notifying anyone. PD found the patient walking and recognized him, bringing him back to the group home.  Upon arrival back to the group home, patient reportedly began rubbing two knives together and made threatening statements to group home staff. They note that the patient made suicidal statements on scene though he denies at bedside.  Patient notes that he made harmful statements to the staff but denies any homicidal ideations. Knives are reportedly in group home custody. Denies suicidal ideations. Denies visual or auditory hallucinations. No physical symptoms.  Patient notes \"I was just going for a walk.\"  Denies ETOH use or drug use.    Independent Historian   EMS as detailed above.    Review of External Notes   8/8/24 ED visit: emotional liability    Past Medical History     Medical History and Problem List   ADHD  Anxiety  Asthma  Autistic disorder  Depressive disorder  Fetal alcohol syndrome  Heart murmur  Hernia    Medications   albuterol (PROAIR HFA/PROVENTIL HFA/VENTOLIN HFA) 108 (90 Base) MCG/ACT inhaler  apixaban ANTICOAGULANT (ELIQUIS) 5 MG tablet  lisinopril (ZESTRIL) 5 MG tablet  methylphenidate (CONCERTA) 36 MG CR tablet  Sertraline HCl (ZOLOFT PO)  tolterodine ER (DETROL LA) 4 MG 24 hr capsule    Surgical History   Inguinal hernia repair  PE tubes  Tonsillectomy and adenoidectomy  [place] tooth extraction    Physical Exam     Patient Vitals for the past 24 hrs:   BP Temp Temp src Pulse Resp SpO2   08/15/24 2317 (!) 147/92 98.4  F (36.9  C) Temporal 84 20 99 %     Physical Exam  Nursing note and vitals reviewed.  Constitutional: Well nourished.   Eyes: Conjunctiva " normal.    ENT: Nose normal.   Neck: Normal range of motion.  CVS: Normal rate, regular rhythm.  Normal heart sounds.   Pulmonary: Lungs clear to auscultation bilaterally.   GI: Abdomen soft. Nontender  MSK: Moves all extremities  Neuro: Alert. Follows simple commands.  Skin: Skin is warm and dry. No rash noted.   Psychiatric: Normal affect. Denies suicidal ideations/homicidal ideations; cooperative.       Diagnostics     Lab Results   None    Imaging   None    Independent Interpretation   None    ED Course      Medications Administered   Medications - No data to display    Discussion of Management   None    ED Course   ED Course as of 08/15/24 2350   Thu Aug 15, 2024   2332 I obtained history and examined the patient as noted above. I discussed findings and discharge with the patient. All questions answered. Patient will be discharged once a ride is coordinated back to his group home.       Additional Documentation  None    Medical Decision Making / Diagnosis     CMS Diagnoses: None    MIPS   None    MDM   Octavio Kirkpatrick is a 23 year old male with a history of mental health issues including ADHD, autism, emotional lability presenting for mental health evaluation.  Patient reportedly went for a walk though was found by PD and was noted to have 2 knives, making reportedly threatening statements to group home staff.  He presents to the ED, calm and cooperative.  He denies any suicidal ideations or response to internal stimuli.  RN spoke to group home staff who verifies story and also notes that knives are locked away, not accessible to patient or other group home residents.  Group home staff feels comfortable with patient returning to the group home at this point in time.  We did attempt to contact guardian though without success.  I do not feel he requires emergent DEC evaluation at this point in time.  Patient seems to have insight into his actions this evening and has a longstanding history of emotional  lability.  Return precautions given.     Disposition   The patient was discharged.     Diagnosis     ICD-10-CM    1. Emotional lability  R45.86         Scribe Disclosure:  I, Uday Pate, am serving as a scribe at 11:46 PM on 8/15/2024 to document services personally performed by Nohemi Ornelas DO based on my observations and the provider's statements to me.          Nohemi Ornelas DO  08/16/24 0003

## 2024-08-16 NOTE — CONSULTS
8/15/2024  Octavio Kirkpatrick 2000     Writer consulted with ED on this date.  It was determined that pt would not benefit from assessment at this time due to MD no longer requesting DEC assessment    DEC order has been closed at this time.    Mati Luis

## 2024-08-23 ENCOUNTER — HOSPITAL ENCOUNTER (EMERGENCY)
Facility: CLINIC | Age: 24
Discharge: HOME | End: 2024-08-24
Attending: EMERGENCY MEDICINE | Admitting: EMERGENCY MEDICINE
Payer: COMMERCIAL

## 2024-08-23 VITALS
SYSTOLIC BLOOD PRESSURE: 125 MMHG | OXYGEN SATURATION: 98 % | HEART RATE: 89 BPM | DIASTOLIC BLOOD PRESSURE: 83 MMHG | RESPIRATION RATE: 18 BRPM | TEMPERATURE: 98.4 F

## 2024-08-23 DIAGNOSIS — I26.02 ACUTE SADDLE PULMONARY EMBOLISM WITH ACUTE COR PULMONALE (H): ICD-10-CM

## 2024-08-23 DIAGNOSIS — F43.0 ACUTE REACTION TO SITUATIONAL STRESS: ICD-10-CM

## 2024-08-23 PROBLEM — F41.9 ANXIETY DISORDER, UNSPECIFIED: Status: ACTIVE | Noted: 2024-08-23

## 2024-08-23 PROCEDURE — 99283 EMERGENCY DEPT VISIT LOW MDM: CPT

## 2024-08-23 ASSESSMENT — COLUMBIA-SUICIDE SEVERITY RATING SCALE - C-SSRS
2. HAVE YOU ACTUALLY HAD ANY THOUGHTS OF KILLING YOURSELF IN THE PAST MONTH?: YES
5. HAVE YOU STARTED TO WORK OUT OR WORKED OUT THE DETAILS OF HOW TO KILL YOURSELF? DO YOU INTEND TO CARRY OUT THIS PLAN?: YES
1. IN THE PAST MONTH, HAVE YOU WISHED YOU WERE DEAD OR WISHED YOU COULD GO TO SLEEP AND NOT WAKE UP?: YES
6. HAVE YOU EVER DONE ANYTHING, STARTED TO DO ANYTHING, OR PREPARED TO DO ANYTHING TO END YOUR LIFE?: YES
4. HAVE YOU HAD THESE THOUGHTS AND HAD SOME INTENTION OF ACTING ON THEM?: NO
3. HAVE YOU BEEN THINKING ABOUT HOW YOU MIGHT KILL YOURSELF?: YES

## 2024-08-23 ASSESSMENT — ACTIVITIES OF DAILY LIVING (ADL)
ADLS_ACUITY_SCORE: 35
ADLS_ACUITY_SCORE: 35

## 2024-08-23 NOTE — TELEPHONE ENCOUNTER
[id number]  Octavio Kirkpatrick  23 year old male  CBCD Diagnosis: VTE  CBCD Provider: Oniel    Nick last saw Dr. Engle on 11/9/22 who recommended taking apixaban 5 mg BID indefinitely. Dr. Engle recommended annual visit.     Nick was scheduled to see Dr. Engle this month but ultimately had to reschedule to November 21, 2024. Of note, he has repeatedly been in the emergency department including on the day of cancelled visit with Dr. Engle.    RN will refill AC prescription to November appointment. Will discuss if future refills can be given past that point if patient not able to make appt.    Namita Galaviz RN, BSN, PCCN  Nurse Clinician    [facility]  [address] [place] [address], [address], [place] [address]   Office, direct: [phone]  Main office number: [phone]  Pronouns: She, her, hers

## 2024-08-24 ASSESSMENT — ACTIVITIES OF DAILY LIVING (ADL): ADLS_ACUITY_SCORE: 35
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
## 2024-08-24 NOTE — ED NOTES
"DEC reports patient stated he had \"3-4 pills in my pocket\", patient again searched by RN and Security, Patient states DEC \"must have misheard me, I do not have any pills on me\".  All pockets searched, no pills found, patient calm and cooperative throughout, requesting more crackers.   "

## 2024-08-24 NOTE — ED TRIAGE NOTES
Pt BIBA. Per EMS eloped from group home, sent video to mom taking 8 gabapentin saying he was going to kill himself & then spit them out back in to bottle. Currently denies SI stating he did it because he was upset. VSS.     Triage Assessment (Adult)       Row Name 08/23/24 7471          Triage Assessment    Airway WDL WDL        Respiratory WDL    Respiratory WDL WDL        Skin Circulation/Temperature WDL    Skin Circulation/Temperature WDL WDL        Cardiac WDL    Cardiac WDL WDL        Peripheral/Neurovascular WDL    Peripheral Neurovascular WDL WDL        Cognitive/Neuro/Behavioral WDL    Cognitive/Neuro/Behavioral WDL WDL

## 2024-08-24 NOTE — ED NOTES
Patient requested to remain in street clothes, cooperative with Rn and security for wanding/search. No personal items other than clothes and cell phone.

## 2024-08-24 NOTE — ED PROVIDER NOTES
Emergency Department Note      History of Present Illness     Chief Complaint   Suicidal      HPI   Octavio Kirkpatrick is a 23 year old male who presents at the emergency department for evaluation of suicidal ideation. EMS report that a few hours ago, the patient sent a video to his mom taking 8 gabapentin saying he was going to kill himself. The patient reports that he then spit them out back in to bottle. Octavio explains that they were in his mouth for 30 seconds. Octavio notes that he eloped from his group home. The patient claims that he feels like things have calmed down, and he denies current suicidal ideation.     Independent Historian   History augmented by paramedics    Review of External Notes   Reviewed 8/8/2024 outside ED visit    Past Medical History     Medical History and Problem List   ADHD  Anxiety  Asthma  Autistic disorder  Depressive disorder  Fetal alcohol syndrome  Heart murmur  Hernia, abdominal    Medications   Albuterol  Eliquis  Zestril  Concerta  Zoloft  Detrol     Surgical History   Inguinal hernia repair  PE Tubes  Tonsillectomy  Adenoidectomy  [place] teeth   extraction    Physical Exam     Patient Vitals for the past 24 hrs:   BP Temp Temp src Pulse Resp SpO2   08/23/24 2139 125/83 98.4  F (36.9  C) Oral 89 18 98 %     Physical Exam  Constitutional: Alert, attentive  HENT:    Nose: Nose normal.    Mouth/Throat: Oropharynx is clear, mucous membranes are moist   Eyes: EOM are normal.   CV: regular rate and rhythm; no murmurs, rubs or gallups  Chest: Effort normal and breath sounds normal.   GI:  There is no tenderness. No distension. Normal bowel sounds  MSK: Normal range of motion.   Neurological: Alert, attentive  Skin: Skin is warm and dry.  PSYCH:  Appearance:  awake, alert, adequately groomed, dressed in street clothes and appeared as age stated  Attitude:  cooperative  Eye Contact:  good  Mood:  good  Affect:  appropriate and in normal range and mood congruent  Speech:  clear,  coherent  Psychomotor Behavior:  no evidence of tardive dyskinesia, dystonia, or tics  Thought Process:  logical, linear and goal oriented  Associations:  no loose associations  Thought Content:  no evidence of suicidal ideation or homicidal ideation; no auditory or visual hallucinations   Insight:  fair      ED Course      Discussion of Management   None    ED Course   ED Course as of 08/24/24 0031   Fri Aug 23, 2024   2216 I obtained history and examined the patient as noted above      Recheck: Patient remains without evidence of sedation, and is neither suicidal nor homicidal.  He is safer discharge this time.    Additional Documentation  Consultations: DEC consultation performed and restratification with patient.  DEC advises that the group home staff saw the patient put 3 pills in his mouth, then spit them all out.    Medical Decision Making / Diagnosis     CMS Diagnoses: None    MIPS       None    MDM   Octavio Kirkpatrick is a 23 year old male with FAS and multiple medical problems, currently group home resident, who presents for evaluation after acute stress reaction.  Per his group home, he has similar behavior every 2 to 3 days.  He was observed placing medications to mouth and spitting them all out and pills are accounted for.  He is evaluate by DEC and does not need additional mental health evaluation or admission at this time.  He exhibits no signs or symptoms of sedation and has no access to other medications.  He is neither suicidal nor homicidal.  He is safe for discharge at this time with outpatient follow-up and return precautions.    Disposition   The patient was discharged.     Diagnosis     ICD-10-CM    1. Acute reaction to situational stress  F43.0              Scribe Disclosure:  JOSE D, Miguel Gómez, am serving as a scribe at 11:03 PM on 8/23/2024 to document services personally performed by Jelani Kramer MD based on my observations and the provider's statements to me.        Williams  Jelani Overton MD  08/24/24 6719

## 2024-08-24 NOTE — DISCHARGE INSTRUCTIONS
It was a pleasure taking care of you today. I hope you feel much better soon.  Please follow-up with your primary care doctor in 1 week as needed.  Return immediately for abnormal behavior or any other concerns.

## 2024-08-24 NOTE — ED NOTES
Octavio Kirkpatrick  August 24, 2024  Plan of Care Hand-off Note     Patient Care Path: discharge    Plan for Care:   After therapeutic assessment, intervention and aftercare planning by ED care team and LM and in consultation with attending provider, the patient's circumstances and mental state were appropriate for outpatient management.  It is the recommendation of this clinician that pt discharge with OP MH support to work on emotional control and expression. Pt declines OP therapy appointment offered and has an established medication provider.  At this time, the pt is not presenting as an acute risk to self or others due to the following:  pt appears to be functioning at baseline as group home staff report pt displays similar behaviors every few days, pt denies suicidal intent or homicidal ideation.  Pt engages in safety planning.  Group home staff [facility] reports pt is able to return to group home.  ED MD consulted and supports plan.    Identified Goals and Safety Issues: Impulsive behaviors, emotional dysregulation    Overview:  Group home [phone] (Pt able to return, contact when ready for discharge)     Guardian [phone] - xwfr message          Legal Status:      Psychiatry Consult:       Updated Attending MD regarding plan of care.           Marcela Sanchez

## 2024-08-24 NOTE — PLAN OF CARE
Octavio Kirkpatrick  August 24, 2024  Plan of Care Hand-off Note     Patient Care Path: discharge    Plan for Care:   After therapeutic assessment, intervention and aftercare planning by ED care team and LM and in consultation with attending provider, the patient's circumstances and mental state were appropriate for outpatient management.  It is the recommendation of this clinician that pt discharge with OP MH support to work on emotional control and expression. Pt declines OP therapy appointment offered and has an established medication provider.  At this time, the pt is not presenting as an acute risk to self or others due to the following:  pt appears to be functioning at baseline as group home staff report pt displays similar behaviors every few days, pt denies suicidal intent or homicidal ideation.  Pt engages in safety planning.  Group home staff [facility] reports pt is able to return to group home.  ED MD consulted and supports plan.    Identified Goals and Safety Issues: Impulsive behaviors, emotional dysregulation    Overview:  Group home [phone] (contact when pt ready for discharge)    Guardian [phone] - eqew message          Legal Status:      Psychiatry Consult:       Updated Dr Kramer regarding plan of care.           Marcela Sanchez

## 2024-08-27 ENCOUNTER — TELEPHONE (OUTPATIENT)
Dept: BEHAVIORAL HEALTH | Facility: CLINIC | Age: 24
End: 2024-08-27
Payer: COMMERCIAL

## 2024-09-16 ENCOUNTER — HOSPITAL ENCOUNTER (EMERGENCY)
Facility: CLINIC | Age: 24
Discharge: HOME | End: 2024-09-17
Attending: EMERGENCY MEDICINE | Admitting: EMERGENCY MEDICINE
Payer: COMMERCIAL

## 2024-09-16 DIAGNOSIS — R45.851 SUICIDAL IDEATION: Primary | ICD-10-CM

## 2024-09-16 PROCEDURE — 99283 EMERGENCY DEPT VISIT LOW MDM: CPT | Performed by: EMERGENCY MEDICINE

## 2024-09-16 ASSESSMENT — ACTIVITIES OF DAILY LIVING (ADL)
ADLS_ACUITY_SCORE: 35
ADLS_ACUITY_SCORE: 35

## 2024-09-16 ASSESSMENT — COLUMBIA-SUICIDE SEVERITY RATING SCALE - C-SSRS
2. HAVE YOU ACTUALLY HAD ANY THOUGHTS OF KILLING YOURSELF IN THE PAST MONTH?: NO
1. IN THE PAST MONTH, HAVE YOU WISHED YOU WERE DEAD OR WISHED YOU COULD GO TO SLEEP AND NOT WAKE UP?: NO
6. HAVE YOU EVER DONE ANYTHING, STARTED TO DO ANYTHING, OR PREPARED TO DO ANYTHING TO END YOUR LIFE?: NO

## 2024-09-17 VITALS
SYSTOLIC BLOOD PRESSURE: 139 MMHG | TEMPERATURE: 98.2 F | HEART RATE: 78 BPM | DIASTOLIC BLOOD PRESSURE: 81 MMHG | OXYGEN SATURATION: 99 % | RESPIRATION RATE: 16 BRPM

## 2024-09-17 PROCEDURE — 250N000013 HC RX MED GY IP 250 OP 250 PS 637: Performed by: EMERGENCY MEDICINE

## 2024-09-17 RX ORDER — GABAPENTIN 100 MG/1
100 CAPSULE ORAL 3 TIMES DAILY
Status: DISCONTINUED | OUTPATIENT
Start: 2024-09-17 | End: 2024-09-17 | Stop reason: HOSPADM

## 2024-09-17 RX ORDER — LISINOPRIL 5 MG/1
5 TABLET ORAL DAILY
Status: DISCONTINUED | OUTPATIENT
Start: 2024-09-17 | End: 2024-09-17 | Stop reason: HOSPADM

## 2024-09-17 RX ORDER — QUETIAPINE FUMARATE 100 MG/1
100 TABLET, FILM COATED ORAL 3 TIMES DAILY
COMMUNITY

## 2024-09-17 RX ORDER — QUETIAPINE FUMARATE 100 MG/1
100 TABLET, FILM COATED ORAL 3 TIMES DAILY
Status: DISCONTINUED | OUTPATIENT
Start: 2024-09-17 | End: 2024-09-17 | Stop reason: HOSPADM

## 2024-09-17 RX ORDER — METHYLPHENIDATE HYDROCHLORIDE 36 MG/1
36 TABLET ORAL DAILY
Status: DISCONTINUED | OUTPATIENT
Start: 2024-09-17 | End: 2024-09-17 | Stop reason: HOSPADM

## 2024-09-17 RX ORDER — TOLTERODINE 4 MG/1
4 CAPSULE, EXTENDED RELEASE ORAL DAILY
Status: DISCONTINUED | OUTPATIENT
Start: 2024-09-17 | End: 2024-09-17 | Stop reason: HOSPADM

## 2024-09-17 RX ORDER — GABAPENTIN 100 MG/1
100 CAPSULE ORAL 3 TIMES DAILY
COMMUNITY

## 2024-09-17 RX ADMIN — GABAPENTIN 100 MG: 100 CAPSULE ORAL at 10:14

## 2024-09-17 RX ADMIN — TOLTERODINE TARTRATE 4 MG: 4 CAPSULE, EXTENDED RELEASE ORAL at 10:24

## 2024-09-17 RX ADMIN — SERTRALINE HYDROCHLORIDE 150 MG: 50 TABLET ORAL at 10:13

## 2024-09-17 RX ADMIN — QUETIAPINE FUMARATE 100 MG: 100 TABLET ORAL at 10:15

## 2024-09-17 RX ADMIN — METHYLPHENIDATE HYDROCHLORIDE 36 MG: 36 TABLET, EXTENDED RELEASE ORAL at 10:12

## 2024-09-17 RX ADMIN — Medication 5 MG: at 02:32

## 2024-09-17 RX ADMIN — LISINOPRIL 5 MG: 5 TABLET ORAL at 10:12

## 2024-09-17 RX ADMIN — APIXABAN 5 MG: 5 TABLET, FILM COATED ORAL at 10:15

## 2024-09-17 ASSESSMENT — COLUMBIA-SUICIDE SEVERITY RATING SCALE - C-SSRS
SUICIDE, SINCE LAST CONTACT: NO
TOTAL  NUMBER OF ABORTED OR SELF INTERRUPTED ATTEMPTS SINCE LAST CONTACT: NO
1. SINCE LAST CONTACT, HAVE YOU WISHED YOU WERE DEAD OR WISHED YOU COULD GO TO SLEEP AND NOT WAKE UP?: NO
6. HAVE YOU EVER DONE ANYTHING, STARTED TO DO ANYTHING, OR PREPARED TO DO ANYTHING TO END YOUR LIFE?: NO
2. HAVE YOU ACTUALLY HAD ANY THOUGHTS OF KILLING YOURSELF?: NO
TOTAL  NUMBER OF INTERRUPTED ATTEMPTS SINCE LAST CONTACT: NO
ATTEMPT SINCE LAST CONTACT: NO

## 2024-09-17 ASSESSMENT — ACTIVITIES OF DAILY LIVING (ADL)
ADLS_ACUITY_SCORE: 35

## 2024-09-17 NOTE — ED TRIAGE NOTES
The following hx is provided by EMS:    Patient arrives for SI. Patient left his group home tonight and sent staff a photo of himself holding a knife and saying he wants to commit suicide. Police caught up with him, no weapons found on patient. Police found that the photo sent to staff was an old photo. Denied SI to police. Admits to being stressed out lately, and that he has court tomorrow which is adding to stress. Calm and cooperative. VSS. Hx HTN, compliant with meds. BP 120s/70s.       
Statement Selected

## 2024-09-17 NOTE — ED NOTES
RN ED Mental Health Handoff Note    DK    Does patient require 1:1? No    Hold and rights been given and documented for patient: Yes    Is the patient in BH scrubs? No - in own clothes per pt preference     Has the patient been searched? Yes    Is the 15 minute observation tool up to date? Yes    Was patient issued a welcome folder? Yes    Room check completed this shift: Yes    PSS3 and [place] Assessment/Reassessment this shift:    C-SSRS ([place])      Date and Time Q1 Wished to be Dead (Past Month) Q2 Suicidal Thoughts (Past Month) Q3 Suicidal Thought Method Q4 Suicidal Intent without Specific Plan Q5 Suicide Intent with Specific Plan Q6 Suicide Behavior (Lifetime) If yes to Q6, within past 3 months? Level of Risk per Screen Level of Risk per Screen User   09/16/24 2325 0-->no 0-->no -- -- -- -- -- -- no risks indicated LF   09/16/24 2324 -- -- -- -- -- 0-->no -- -- -- LF   09/16/24 2127 0-->no 0-->no -- -- -- 0-->no -- -- no risks indicated CLB            Behavioral status of patient: Green    Code 21 called this shift? No    Use of restraints/seclusion this shift? No    Most recent vital signs:  Temp: 99.3  F (37.4  C) Temp src: Oral BP: 127/80 Pulse: 83   Resp: 14 SpO2: 100 % O2 Device: None (Room air)      Medications:  Scheduled medication compliance? No scheduled meds    PRN Meds administered this shift? No PRN meds ordered    Medications - No data to display      ADLs    Meal Provided this shift? No - snacks given    Hygiene items provided? No    ADLs completed? No    Date of last shower: unknown    Any significant events this shift? No    Any information that would be helpful in caring for this patient?  N/A    Family present/updated? No    Location of patient's belongings: with patient - phone and wallet     Critical Care Minutes:  Does the patient need critical care minutes documented? No

## 2024-09-17 NOTE — PLAN OF CARE
"Octavio Kirkpatrick  September 16, 2024  Plan of Care Hand-off Note     Patient Care Path: observation    Plan for Care:   After therapeutic assessment, intervention and aftercare planning by ED care team and LM and in consultation with attending provider, the patient's circumstances and mental state were appropriate for outpatient management. It is the recommendation of this clinician that pt discharge with OP MH support. A this time the pt is not presenting as an acute risk to self or others due to the following factors: Pt presents for suicidal threat by holding a knife to his throat and sending a picture to staff at his group home. Pt reports this picture was not  taken recently and reports, \"I was just reacting to stress with having court tomorrow. I was never going to do anything.\" Pt denies SI/SIB/SA/HI and denies plans, means, or intent to harm himself or others. Pt denies halluciantions or delusions. Pt reports feeling safe to return to his group home. Writer unable to reach pt guardian, which collateral is needed in order to discharge plan. Pt will remain on observation status until guardian can be reached. Group [place] is able to support pt.    Identified Goals and Safety Issues: Requires collateral and discharge planning with guardian. Pt denies current SI/SIB/SA/HI and denies plans, means, or intent to harm himself or others.    Overview:  [facility], [phone] [facility] [phone]     [facility] [phone]          Legal Status: Legal Status at Admission: Guardian/ad litum    Psychiatry Consult: no       Updated   regarding plan of care.           Feli Esquivel, [facility]        "
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
## 2024-09-17 NOTE — ED PROVIDER NOTES
"  Emergency Department Note      History of Present Illness     Chief Complaint   Mental Health Problem      HPI   Octavio \"Nick\" HAN Kirkpatrick is a 23 year old male with history of autism, anxiety, depression, and PE on Eliquis who presents to the ED via EMS from  for evaluation of a mental health problem. Per triage note, patient left his  tonight and sent staff a photo of himself holding a knife with suicidal statements. PD found the patient in the community without any weapons on his person and found that the photo he sent was an old photo. Patient denied suicidal ideation to police and in the ED. He states he is stressed tonight because he has court tomorrow. Patient denies suicidal ideation or plan, homicidal ideation, or hallucinations. He states he has been compliant with his daily medications. Notes he had a cough, runny nose, and congestion last week that has since improved. He denies fever, chills, nausea, vomiting, abdominal pain, chest pain, or shortness of breath.    Independent Historian   EMS as above.    Review of External Notes   9/5/2024 office visit note    Past Medical History     Medical History and Problem List   Autism  Fetal alcohol syndrome  Intellectual disability  Bilateral PE  KIMBER  Depressed mood  Psychosis  Bipolar disorder  Acute renal failure  Anemia  ADHD  Asthma  Heart murmur  Abdominal hernia  Multiple melanocytic nevi  Pelvic floor dysfunction    Medications   Apixaban  Lisinopril  Methylphenidate  Sertraline  Tolterodine  Gabapentin  Quetiapine    Surgical History   [place] teeth extraction  T&A  PE tubes  Inguinal hernia repair    Physical Exam     Patient Vitals for the past 24 hrs:   BP Temp Temp src Pulse Resp SpO2   09/16/24 2131 127/80 99.3  F (37.4  C) Oral 83 14 100 %     Physical Exam  Constitutional:       Appearance: Normal appearance.      General: Not in acute distress.  HENT:      Head: Normocephalic and atraumatic.   Eyes:      Extraocular Movements: Extraocular " movements intact.      Conjunctiva/sclera: Conjunctivae normal.   Cardiovascular:      Rate and Rhythm: Normal rate and regular rhythm.   Pulmonary:      Effort: Pulmonary effort is normal. No respiratory distress.   Abdominal:      General: Abdomen is flat. There is no distension.   Musculoskeletal:         General: No swelling or deformity.      Cervical back: Normal range of motion. No rigidity.   Skin:     Coloration: Skin is not jaundiced or pale.   Neurological:      General: No focal deficit present.      Mental Status: Alert and oriented to person, place, and time.   Psychiatric:         Mood and Affect: Mood normal.         Behavior: Behavior normal.    Diagnostics     Lab Results   Labs Ordered and Resulted from Time of ED Arrival to Time of ED Departure - No data to display    Imaging   No orders to display       Independent Interpretation   None    ED Course      Medications Administered   Medications   melatonin tablet 5 mg (has no administration in time range)       Procedures   Procedures     Discussion of Management   See below    ED Course   ED Course as of 09/17/24 0230   Mon Sep 16, 2024   2140 I obtained history and examined the patient as noted above.    2245 I spoke with Feli of DEC regarding their assessment of the patient. They are unable to formally recommend discharge as legal guardian could not be reached. DEC recommends ED observation in the mean time.   Tue Sep 17, 2024   0055 Disposition pending guardian call back per DEC        Additional Documentation  None    Medical Decision Making / Diagnosis     CMS Diagnoses: None    MIPS       None    MDM   Octavio HAN Kirkpatrick is a 23 year old male as described above presents to the emergency department for suicidal ideation at group home and was noted to send group home staff an old picture of himself holding a knife saying he wants to commit suicide.  Patient denies having any actual suicide plans or intention and reports he only  verbalized this earlier in the day as he is stressed out regarding a court appearance tomorrow.  At this time, patient denies any SI, HI, hallucinations.  Patient otherwise has no other medical complaints at this time indicating need for further lab work or imaging.  Patient is medically cleared for mental health evaluation and assessment.  Final disposition pending DEC.  Discussed care plan with patient who voiced understanding and agreement with plan.  Answered all questions.  Additional workup and orders as listed in chart.    Ultimately, patient was evaluated by DEC  who believes patient may potentially be discharged back to group home; however, given patient has a legal guardian and legal guardian cannot be reached by DEC  for collateral information, DEC  cannot formally make this recommendation at this time.  As result, continues to board in the ER pending legal guardian callback.    Please refer to ED course above as part of continuation of MDM for details on the patient's treatment course and any potential changes or updates beyond my initial evaluation and MDM creation.    Disposition   Care of the patient was transferred to my colleague Dr. Ornelas pending DEC discussion with guardian for final recommendation/disposition.     Diagnosis     ICD-10-CM    1. Suicidal ideation  R45.851            Discharge Medications   New Prescriptions    No medications on file         Scribe Disclosure:  I, Malia Krueger, am serving as a scribe at 10:23 PM on 9/16/2024 to document services personally performed by Jose Colindres DO based on my observations and the provider's statements to me.        Jose Colindres DO  09/17/24 0230       Jose Colindres DO  09/17/24 0230

## 2024-09-17 NOTE — PHARMACY-ADMISSION MEDICATION HISTORY
Pharmacist Admission Medication History    Admission medication history is complete. The information provided in this note is only as accurate as the sources available at the time of the update.    Information Source(s): Patient via in-person    Pertinent Information: None    Changes made to PTA medication list:  Added: gabapentin, quetiapine  Deleted: None  Changed: sertraline PO --> 100+50mg tabs    Allergies reviewed with patient and updates made in EHR: yes    Medication History Completed By: Ban Pritchard RPH 9/17/2024 7:44 AM    PTA Med List   Medication Sig Last Dose    albuterol (PROAIR HFA/PROVENTIL HFA/VENTOLIN HFA) 108 (90 Base) MCG/ACT inhaler Inhale 2 puffs into the lungs every 6 hours as needed for shortness of breath, wheezing or cough prn at prn    apixaban ANTICOAGULANT (ELIQUIS) 5 MG tablet Take 1 tablet (5 mg) by mouth 2 times daily. 9/16/2024 at x2    gabapentin (NEURONTIN) 100 MG capsule Take 100 mg by mouth 3 times daily. 9/16/2024 at x3    lisinopril (ZESTRIL) 5 MG tablet Take 1 tablet (5 mg) by mouth daily 9/16/2024 at am    methylphenidate (CONCERTA) 36 MG CR tablet Take 36 mg by mouth every morning 9/16/2024 at am    QUEtiapine (SEROQUEL) 100 MG tablet Take 100 mg by mouth 3 times daily. 9/16/2024 at x3    sertraline (ZOLOFT) 100 MG tablet Take 100 mg by mouth daily. Take with 50mg tablet for total dose of 150mg 9/16/2024    sertraline (ZOLOFT) 50 MG tablet Take 50 mg by mouth daily. Take with 100mg tablet for total dose of 150mg 9/16/2024 at am    tolterodine ER (DETROL LA) 4 MG 24 hr capsule Take 4 mg by mouth daily 9/16/2024 at am    VITAMIN D, CHOLECALCIFEROL, PO Take 400 Units by mouth daily  9/16/2024 at am

## 2024-09-17 NOTE — ED NOTES
RN ED Mental Health Handoff Note    DK    Does patient require 1:1? No    Hold and rights been given and documented for patient: Yes    Is the patient in BH scrubs? No -own clothes    Has the patient been searched? Yes    Is the 15 minute observation tool up to date? Yes    Was patient issued a welcome folder? Yes    Room check completed this shift: Yes    PSS3 and [place] Assessment/Reassessment this shift:    C-SSRS ([place])      Date and Time Q1 Wished to be Dead (Past Month) Q2 Suicidal Thoughts (Past Month) Q3 Suicidal Thought Method Q4 Suicidal Intent without Specific Plan Q5 Suicide Intent with Specific Plan Q6 Suicide Behavior (Lifetime) If yes to Q6, within past 3 months? Level of Risk per Screen Level of Risk per Screen User   09/16/24 2325 0-->no 0-->no -- -- -- -- -- -- no risks indicated LF   09/16/24 2324 -- -- -- -- -- 0-->no -- -- -- LF   09/16/24 2127 0-->no 0-->no -- -- -- 0-->no -- -- no risks indicated CLB            Behavioral status of patient: Green    Code 21 called this shift? No    Use of restraints/seclusion this shift? No    Most recent vital signs:  Temp: 98.2  F (36.8  C) Temp src: Temporal BP: 122/77 Pulse: 74   Resp: 16 SpO2: 99 % O2 Device: None (Room air)      Medications:  Scheduled medication compliance? N/A    PRN Meds administered this shift? No    Medications   melatonin tablet 5 mg (5 mg Oral $Given 9/17/24 9473)         ADLs    Meal Provided this shift? Yes- snacks provided    Hygiene items provided? No    ADLs completed? Yes    Date of last shower: unknown    Any significant events this shift? No    Any information that would be helpful in caring for this patient?      Family present/updated? No    Location of patient's belongings: with patient- phone and wallet    Critical Care Minutes:  Does the patient need critical care minutes documented? No
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
## 2024-09-18 ENCOUNTER — TELEPHONE (OUTPATIENT)
Dept: BEHAVIORAL HEALTH | Facility: CLINIC | Age: 24
End: 2024-09-18
Payer: COMMERCIAL

## 2024-09-18 NOTE — TELEPHONE ENCOUNTER
Triage and Transition Services- Patient Follow Up Call  Service Line Making Phone Call: Extended Care    Who did Writer Talk to: NA    Details of Call: [facility] DEC queue phone number for [facility].    Joceline Cunningham 9/18/2024 12:33 PM

## 2024-10-04 ENCOUNTER — TRANSCRIBE ORDERS (OUTPATIENT)
Dept: OTHER | Age: 24
End: 2024-10-04

## 2024-10-04 DIAGNOSIS — Z02.89 REFERRED BY SELF: Primary | ICD-10-CM

## 2024-10-10 ENCOUNTER — HOSPITAL ENCOUNTER (EMERGENCY)
Facility: CLINIC | Age: 24
Discharge: HOME | End: 2024-10-10
Attending: EMERGENCY MEDICINE | Admitting: EMERGENCY MEDICINE
Payer: COMMERCIAL

## 2024-10-10 ENCOUNTER — HOSPITAL ENCOUNTER (EMERGENCY)
Facility: CLINIC | Age: 24
Discharge: HOME | End: 2024-10-10
Attending: STUDENT IN AN ORGANIZED HEALTH CARE EDUCATION/TRAINING PROGRAM | Admitting: STUDENT IN AN ORGANIZED HEALTH CARE EDUCATION/TRAINING PROGRAM
Payer: COMMERCIAL

## 2024-10-10 VITALS
HEART RATE: 92 BPM | BODY MASS INDEX: 24.76 KG/M2 | HEIGHT: 74 IN | TEMPERATURE: 97 F | RESPIRATION RATE: 18 BRPM | OXYGEN SATURATION: 98 % | SYSTOLIC BLOOD PRESSURE: 131 MMHG | WEIGHT: 192.9 LBS | DIASTOLIC BLOOD PRESSURE: 74 MMHG

## 2024-10-10 VITALS
HEART RATE: 74 BPM | DIASTOLIC BLOOD PRESSURE: 85 MMHG | BODY MASS INDEX: 24.51 KG/M2 | TEMPERATURE: 97 F | HEIGHT: 74 IN | SYSTOLIC BLOOD PRESSURE: 157 MMHG | RESPIRATION RATE: 18 BRPM | OXYGEN SATURATION: 99 % | WEIGHT: 191 LBS

## 2024-10-10 DIAGNOSIS — S91.114A LACERATION OF LESSER TOE OF RIGHT FOOT WITHOUT FOREIGN BODY PRESENT OR DAMAGE TO NAIL, INITIAL ENCOUNTER: ICD-10-CM

## 2024-10-10 PROCEDURE — 12001 RPR S/N/AX/GEN/TRNK 2.5CM/<: CPT

## 2024-10-10 PROCEDURE — 99282 EMERGENCY DEPT VISIT SF MDM: CPT

## 2024-10-10 PROCEDURE — 99282 EMERGENCY DEPT VISIT SF MDM: CPT | Mod: 27

## 2024-10-10 ASSESSMENT — COLUMBIA-SUICIDE SEVERITY RATING SCALE - C-SSRS
6. HAVE YOU EVER DONE ANYTHING, STARTED TO DO ANYTHING, OR PREPARED TO DO ANYTHING TO END YOUR LIFE?: NO
1. IN THE PAST MONTH, HAVE YOU WISHED YOU WERE DEAD OR WISHED YOU COULD GO TO SLEEP AND NOT WAKE UP?: NO
6. HAVE YOU EVER DONE ANYTHING, STARTED TO DO ANYTHING, OR PREPARED TO DO ANYTHING TO END YOUR LIFE?: YES
2. HAVE YOU ACTUALLY HAD ANY THOUGHTS OF KILLING YOURSELF IN THE PAST MONTH?: NO
1. IN THE PAST MONTH, HAVE YOU WISHED YOU WERE DEAD OR WISHED YOU COULD GO TO SLEEP AND NOT WAKE UP?: YES
2. HAVE YOU ACTUALLY HAD ANY THOUGHTS OF KILLING YOURSELF IN THE PAST MONTH?: NO

## 2024-10-10 ASSESSMENT — ACTIVITIES OF DAILY LIVING (ADL)
ADLS_ACUITY_SCORE: 35
ADLS_ACUITY_SCORE: 35

## 2024-10-10 NOTE — ED PROVIDER NOTES
"  Emergency Department Note      History of Present Illness     Chief Complaint   Toe Injury      HPI   Octavio \"Nick\" HAN Kirkpatrick is a 23 year old male with history of PE on Eliquis who presents to the ED for evaluation of a toe injury. Nick reports he was walking through his art room this morning when he stepped on an art knife that was on the floor. He sustained a small laceration to the distal pad of the right 2nd toe. States he did not fall. Per [facility], patient's tetanus was last updated in 2021.    Independent Historian   None    Review of External Notes   9/16/24: ED note reviewed    Past Medical History     Medical History and Problem List   Autism  Fetal alcohol syndrome  Intellectual disability  Bilateral PE  KIMBER  Depressed mood  Psychosis  Bipolar disorder  Acute renal failure  Anemia  ADHD  Asthma  Heart murmur  Abdominal hernia  Multiple melanocytic nevi  Pelvic floor dysfunction     Medications   Apixaban  Lisinopril  Methylphenidate  Sertraline  Tolterodine  Gabapentin  Quetiapine     Surgical History   [place] teeth extraction  T&A  PE tubes  Inguinal hernia repair    Physical Exam     Patient Vitals for the past 24 hrs:   BP Temp Temp src Pulse Resp SpO2 Height Weight   10/10/24 0801 (!) 157/85 97  F (36.1  C) Temporal 74 18 99 % 1.88 m (6' 2\") 86.6 kg (191 lb)     Physical Exam  General: No acute distress.  Head: No obvious trauma to head.  Eyes:  Conjunctivae clear.   Neck: Normal range of motion.   CV: Skin is well perfused, no cyanosis  Respiratory: Effort normal. No audible wheezing.  Gastrointestinal: Non-distended.  Musculoskeletal: Normal range of motion. No gross deformities.  Neuro: Alert. Moving all extremities appropriately.  Normal speech.    Skin: Right 2nd toe: 2 cm superficial laceration to the distal pad of the toe with no active hemorrhaging.     Diagnostics     Lab Results   Labs Ordered and Resulted from Time of ED Arrival to Time of ED Departure - No data to display    Imaging   No " orders to display       Independent Interpretation   None    ED Course      Medications Administered   Medications - No data to display    Procedures   Procedures    Laceration Repair      Procedure: Laceration Repair    Indication: Laceration    Consent: Verbal    Tetanus status reviewed    Location: Right Second toe    Length: 2 cm    Preparation: Irrigation with Sterile Saline.      Treatment/Exploration: Wound explored, no foreign bodies found     Closure: The wound was closed with Tissue Adhesive.    Patient Status: The patient tolerated the procedure well: Yes. There were no complications.     Discussion of Management   None    ED Course   ED Course as of 10/10/24 1512   u Oct 10, 2024   0833 I obtained history and examined the patient as noted above.    0857 I applied glue to the wound. Findings and plan explained to the Patient. Patient discharged home with instructions regarding supportive care, medications, and reasons to return. The importance of close follow-up was reviewed.        Additional Documentation  None    Medical Decision Making / Diagnosis     CMS Diagnoses: None    MIPS       None    [facility]   Octavio Kirkpatrick is a 23 year old male presenting with a laceration to the right second toe.  There is no evidence of tendon or bone involvement of the laceration and bleeding is controlled upon arrival.  The laceration is cleaned thoroughly.  It is repaired with skin glue.  There is no signs of infection.  Laceration care instructions are given and he verbalizes understanding.  He is discharged home in stable condition.    Disposition   The patient was discharged.     Diagnosis     ICD-10-CM    1. Laceration of lesser toe of right foot without foreign body present or damage to nail, initial encounter  S91.114A            Discharge Medications   Discharge Medication List as of 10/10/2024  9:20 AM            Scribe Disclosure:  Malia JEFFERY, sergey serving as a scribe at 8:50 AM on 10/10/2024 to  document services personally performed by Go Chiang MD based on my observations and the provider's statements to me.        Go Chiang MD  10/10/24 2049

## 2024-10-10 NOTE — DISCHARGE INSTRUCTIONS
Your laceration on your toe was repaired using glue.  This will fall off over the coming days.  You can shower and bathe as you normally would, but do not go swimming for 10 days.  Keep the area clean.  Please return the emergency department if you develop any new or concerning symptoms.

## 2024-10-10 NOTE — ED TRIAGE NOTES
"Pt presents after cutting 2nd toe R foot on an \"art knife\" that was on the floor. Pt reports being on eliquis. Bleeding controlled upon arrival. A & Ox4.      Triage Assessment (Adult)       Row Name 10/10/24 0802          Triage Assessment    Airway WDL WDL        Respiratory WDL    Respiratory WDL WDL        Skin Circulation/Temperature WDL    Skin Circulation/Temperature WDL X  bleeding from R foot 2nd toe        Peripheral/Neurovascular WDL    Peripheral Neurovascular WDL WDL        Cognitive/Neuro/Behavioral WDL    Cognitive/Neuro/Behavioral WDL WDL                     "

## 2024-10-11 ENCOUNTER — NURSE TRIAGE (OUTPATIENT)
Dept: NURSING | Facility: CLINIC | Age: 24
End: 2024-10-11
Payer: COMMERCIAL

## 2024-10-11 NOTE — ED TRIAGE NOTES
Pt. Presents to ED to have derma bond reapplied to his R toe that he cut earlier today that was glued in ED earlier. Pt. Reports he is on blood thinners, reports some pain in the toe. AVSS on RA. A & O x 4, independent.,

## 2024-10-11 NOTE — DISCHARGE INSTRUCTIONS
Continue ibuprofen and Tylenol for pain relief as sometimes cuts can cause significant pain.  Continue to keep a bandage on wound and change the bandage daily.  If you develop bleeding that cannot stop, return to ER.  Follow-up with your primary care provider to ensure wound is healing appropriately.

## 2024-10-11 NOTE — ED PROVIDER NOTES
"  Emergency Department Note      History of Present Illness     Chief Complaint   Laceration      HPI   Octavio Kirkpatrick is a 23 year old male who presents with bleeding from laceration sustained earlier today.  Patient presented earlier today to the ER with a toe injury.  At the time, he was walking through his art room and stepped on a knife that was on the floor.  Small laceration was noted to the distal pad of the right second toe.  Dermabond was applied.  Patient comes in once more requesting more Dermabond to be placed.        Independent Historian   None    Review of External Notes   None    Past Medical History     Medical History and Problem List   Past Medical History:   Diagnosis Date    ADHD     Anxiety     Asthma 2000    Autistic disorder     Depressive disorder 2008    Fetal alcohol syndrome     Heart murmur     Hernia, abdominal        Medications   albuterol (PROAIR HFA/PROVENTIL HFA/VENTOLIN HFA) 108 (90 Base) MCG/ACT inhaler  apixaban ANTICOAGULANT (ELIQUIS) 5 MG tablet  gabapentin (NEURONTIN) 100 MG capsule  lisinopril (ZESTRIL) 5 MG tablet  methylphenidate (CONCERTA) 36 MG CR tablet  QUEtiapine (SEROQUEL) 100 MG tablet  sertraline (ZOLOFT) 100 MG tablet  sertraline (ZOLOFT) 50 MG tablet  tolterodine ER (DETROL LA) 4 MG 24 hr capsule  VITAMIN D, CHOLECALCIFEROL, PO        Surgical History   Past Surgical History:   Procedure Laterality Date    INGUINAL HERNIA REPAIR Bilateral     PE TUBES      TONSILLECTOMY & ADENOIDECTOMY      [place] teeth extraction         Physical Exam     Patient Vitals for the past 24 hrs:   BP Temp Temp src Pulse Resp SpO2 Height Weight   10/10/24 1917 131/74 -- -- -- -- -- -- --   10/10/24 1916 -- 97  F (36.1  C) Temporal 92 18 98 % -- --   10/10/24 1914 -- -- -- -- -- -- 1.88 m (6' 2\") 87.5 kg (192 lb 14.4 oz)     Physical Exam  General: Alert and cooperative with exam. Patient in no apparent distress. Normal mentation.  Head:  Scalp is NC/AT  Eyes:  EOM " intact  ENT:  The external nose and ears are normal.   Neck:  Normal range of motion without rigidity.  CV:  Warm and well perfused  Resp:  Breathing comfortably on room air  Skin:  Right second toe with 2 cm superficial laceration to the distal pad with small area of mild ooze of bleed  Neuro:  Oriented x 3. No gross motor deficits.        ED Course      Medications Administered   Medications - No data to display    Procedures   See Irma Christopher's separate procedural note.     Discussion of Management   None    ED Course        Additional Documentation  None    Medical Decision Making / Diagnosis     CMS Diagnoses: None    MIPS       None    MDM   Octavio Kirkpatrick is a 23 year old male presents with right second toe laceration sustained earlier today.  He was seen earlier in the ED where he had Dermabond placed however did return home and some bleeding did recur.  On exam now, small area noted with mild ongoing bleeding.  This area was able to stop bleeding after pressure was applied and small amount of Dermabond was placed to ensure wound edges closed.  Discussed ongoing wound cares for home.  Also encouraged ibuprofen and Tylenol for any ongoing pain.  Patient voiced understanding and agreement with this plan.  All questions answered.    Disposition   The patient was discharged.     Diagnosis     ICD-10-CM    1. Laceration of lesser toe of right foot without foreign body present or damage to nail, initial encounter  S91.114A            Discharge Medications   New Prescriptions    No medications on file         TERESA Lowery Lauren R, PA-C  10/10/24 2016

## 2024-10-12 NOTE — TELEPHONE ENCOUNTER
"Nurse Triage SBAR    Is this a 2nd Level Triage? NO    Situation: Pt calling with concerns for chills.    Background: Pt was seen yesterday in the ER yesterday for a toe laceration that was repaired with glue.     Assessment: It does not sound like the wound is very deep. He is still bleeding slightly but is also on blood thinners. He has not been walking on his toes. He states he was having the chills tonight. Pain is mild and \"seems to be healing\". Temp is normal. Denies fever, redness, and discharge.    Protocol Recommended Disposition:   See PCP Within 24 Hours    Recommendation: Suggested being seen again tomorrow in the UC if he is wanting it looked at again. Pt is going to send a picture to a provider.     Reason for Disposition   Other signs of wound infection    Additional Information   Negative: [1] Widespread rash AND [2] bright red, sunburn-like AND [3] too weak to stand   Negative: Sounds like a life-threatening emergency to the triager   Negative: Stitches (or staples) and not infected   Negative: Skin glue used to close wound and not infected   Negative:  surgical wound infection suspected   Negative: Surgical wound infection suspected (post-op)   Negative: Animal bite wound infection suspected   Negative: Chronic wound care and Negative Pressure Wound Therapy (NPWT) symptoms and questions   Negative: [1] Widespread rash AND [2] bright red, sunburn-like   Negative: Patient sounds very sick or weak to the triager   Negative: [1] Looks infected (spreading redness, red streak, pus) AND [2] fever   Negative: [1] Face wound AND [2] looks infected (e.g., spreading redness)   Negative: [1] Finger wound AND [2] entire finger swollen   Negative: [1] Skin around the wound has become red AND [2] larger than 2 inches (5 cm)   Negative: [1] Red streak runs from the wound AND [2] longer than 1 inch (2.5 cm)   Negative: SEVERE pain in the wound   Negative: [1] Red area or streak AND [2] no fever   Negative: " [1] Pus or cloudy fluid draining from wound AND [2] no fever   Negative: [1] Taking antibiotic > 48 hours (2 days) AND [2] fever persists   Negative: [1] Taking antibiotic > 72 hours (3 days) AND [2] infected wound not improved (i.e., pain, pus, redness)   Negative: Black (necrotic) or blisters develop in wound    Protocols used: Wound Infection-A-    Elisa Sales RN  [facility] Nurse Advisor   10/11/2024  10:45 PM

## 2024-10-16 ENCOUNTER — TELEPHONE (OUTPATIENT)
Dept: CONSULT | Facility: CLINIC | Age: 24
End: 2024-10-16
Payer: COMMERCIAL

## 2024-10-16 NOTE — TELEPHONE ENCOUNTER
Received appt request (self referral) for patient to be seen in Genetics. Per Genetics team review, will need official referral as well as records in order to ensure that patient is scheduled appropriately. LVM asking patient to have Genetics referral + outside records faxed to [phone]. Patient advised that once referral and records have been received and reviewed by Genetics team,  will reach back out regarding setting up an appointment.

## 2024-10-21 ENCOUNTER — MEDICAL CORRESPONDENCE (OUTPATIENT)
Dept: HEALTH INFORMATION MANAGEMENT | Facility: CLINIC | Age: 24
End: 2024-10-21
Payer: COMMERCIAL

## 2024-10-22 ENCOUNTER — OFFICE VISIT (OUTPATIENT)
Dept: URGENT CARE | Facility: URGENT CARE | Age: 24
End: 2024-10-22
Payer: COMMERCIAL

## 2024-10-22 VITALS
HEART RATE: 101 BPM | SYSTOLIC BLOOD PRESSURE: 140 MMHG | TEMPERATURE: 97.5 F | OXYGEN SATURATION: 98 % | DIASTOLIC BLOOD PRESSURE: 90 MMHG | WEIGHT: 193 LBS | BODY MASS INDEX: 24.78 KG/M2

## 2024-10-22 DIAGNOSIS — R36.9 PENILE DISCHARGE: Primary | ICD-10-CM

## 2024-10-22 LAB
ALBUMIN UR-MCNC: 30 MG/DL
APPEARANCE UR: CLEAR
BACTERIA #/AREA URNS HPF: ABNORMAL /HPF
BILIRUB UR QL STRIP: NEGATIVE
C TRACH DNA SPEC QL NAA+PROBE: NEGATIVE
COLOR UR AUTO: YELLOW
GLUCOSE UR STRIP-MCNC: NEGATIVE MG/DL
HGB UR QL STRIP: NEGATIVE
KETONES UR STRIP-MCNC: NEGATIVE MG/DL
LEUKOCYTE ESTERASE UR QL STRIP: NEGATIVE
MUCOUS THREADS #/AREA URNS LPF: PRESENT /LPF
N GONORRHOEA DNA SPEC QL NAA+PROBE: NEGATIVE
NITRATE UR QL: NEGATIVE
PH UR STRIP: 7.5 [PH] (ref 5–7)
RBC #/AREA URNS AUTO: ABNORMAL /HPF
SP GR UR STRIP: >=1.03 (ref 1–1.03)
SPERM #/AREA URNS HPF: PRESENT /HPF
SQUAMOUS #/AREA URNS AUTO: ABNORMAL /LPF
UROBILINOGEN UR STRIP-ACNC: 0.2 E.U./DL
WBC #/AREA URNS AUTO: ABNORMAL /HPF

## 2024-10-22 PROCEDURE — 87491 CHLMYD TRACH DNA AMP PROBE: CPT | Performed by: FAMILY MEDICINE

## 2024-10-22 PROCEDURE — 96372 THER/PROPH/DIAG INJ SC/IM: CPT | Performed by: FAMILY MEDICINE

## 2024-10-22 PROCEDURE — 81001 URINALYSIS AUTO W/SCOPE: CPT | Performed by: FAMILY MEDICINE

## 2024-10-22 PROCEDURE — 87591 N.GONORRHOEAE DNA AMP PROB: CPT | Performed by: FAMILY MEDICINE

## 2024-10-22 PROCEDURE — 99214 OFFICE O/P EST MOD 30 MIN: CPT | Mod: 25 | Performed by: FAMILY MEDICINE

## 2024-10-22 RX ORDER — CEFTRIAXONE SODIUM 1 G
500 VIAL (EA) INJECTION ONCE
Status: COMPLETED | OUTPATIENT
Start: 2024-10-22 | End: 2024-10-22

## 2024-10-22 RX ORDER — DOXYCYCLINE 100 MG/1
100 CAPSULE ORAL 2 TIMES DAILY
Qty: 14 CAPSULE | Refills: 0 | Status: SHIPPED | OUTPATIENT
Start: 2024-10-22 | End: 2024-10-29

## 2024-10-22 RX ORDER — ALBUTEROL SULFATE 90 UG/1
INHALANT RESPIRATORY (INHALATION)
COMMUNITY
Start: 2024-01-25

## 2024-10-22 RX ORDER — OMEGA-3S/DHA/EPA/FISH OIL/D3 300MG-1000
CAPSULE ORAL
COMMUNITY
Start: 2024-03-26

## 2024-10-22 RX ADMIN — Medication 500 MG: at 11:38

## 2024-10-22 NOTE — PROGRESS NOTES
SUBJECTIVE: Octavio Kirkpatrick is a 23 year old male presenting with a chief complaint of penile discharge.  Onset of symptoms was 1 day(s) ago.      Past Medical History:   Diagnosis Date    ADHD     Anxiety     Asthma 2000    Autistic disorder     Depressive disorder 2008    Fetal alcohol syndrome     Heart murmur     Hernia, abdominal      Allergies   Allergen Reactions    Coconut Flavor GI Disturbance    Dairy Products [Milk Protein]     Gluten Meal     Oatmeal Cramps, Diarrhea, GI Disturbance, Nausea and Other (See Comments)    Red Dye Unknown    Red Dye #40 (Allura Red) Unknown and Other (See Comments)    Tilactase GI Disturbance and Other (See Comments)     Social History     Tobacco Use    Smoking status: Never    Smokeless tobacco: Never   Substance Use Topics    Alcohol use: Yes     Comment: very rare       ROS:  SKIN: no rash  GI: no vomiting    OBJECTIVE:  BP (!) 140/90   Pulse 101   Temp 97.5  F (36.4  C) (Tympanic)   Wt 87.5 kg (193 lb)   SpO2 98%   BMI 24.78 kg/m  GENERAL APPEARANCE: healthy, alert and no distress  GU_male: testicles normal without  masses or  tenderness, no hernias, and urethral discharge clear  SKIN: no suspicious lesions or rashes      ICD-10-CM    1. Penile discharge  R36.9 NEISSERIA GONORRHOEA PCR     CHLAMYDIA TRACHOMATIS PCR     UA Macroscopic with reflex to Microscopic and Culture     cefTRIAXone (ROCEPHIN) in lidocaine 1% (PF) for IM administration only 500 mg     doxycycline hyclate (VIBRAMYCIN) 100 MG capsule     UA Microscopic with Reflex to Culture          Fluids/Rest, f/u if worse/not any better

## 2024-10-25 ENCOUNTER — TRANSCRIBE ORDERS (OUTPATIENT)
Dept: OTHER | Age: 24
End: 2024-10-25

## 2024-10-25 DIAGNOSIS — F81.9 COGNITIVE DEVELOPMENTAL DELAY: Primary | ICD-10-CM

## 2024-10-30 ENCOUNTER — TELEPHONE (OUTPATIENT)
Dept: FAMILY MEDICINE | Facility: CLINIC | Age: 24
End: 2024-10-30

## 2024-10-30 NOTE — TELEPHONE ENCOUNTER
Pt's care team calling about medication:     Disp Refills Start End RACHEL   doxycycline hyclate (VIBRAMYCIN) 100 MG capsule 14 capsule 0 10/22/2024 10/29/2024 No   Sig - Route: Take 1 capsule (100 mg) by mouth 2 times daily for 7 days. - Oral   Sent to pharmacy as: Doxycycline Hyclate 100 MG Oral Capsule (VIBRAMYCIN)   Class: E-Prescribe   Order: [id number]   E-Prescribing Status: Receipt confirmed by pharmacy (10/22/2024 11:25 AM CDT)       Please see 10/22/24 OV. Pt has not yet started medication due to medication not available. Pt received medication in mail today and is wondering if he should still take medication. Pt's care team reporting pt is still having symptoms.     Routing to prescribing provider in UC and PCP. Please review and advise.

## 2024-10-30 NOTE — TELEPHONE ENCOUNTER
ICS; [facility] community services.     [facility],     04/23/2023    Doxycycline

## 2024-11-06 NOTE — TELEPHONE ENCOUNTER
I called the patient.  He is gonorrhea and Chlamydia tests were both negative.  His symptoms spontaneously resolved without the use of the doxycycline.  He was wondering if he should still take the medicine.  I recommend that he not take it.  Verbalized understanding.

## 2024-11-07 ENCOUNTER — TELEPHONE (OUTPATIENT)
Dept: CONSULT | Facility: CLINIC | Age: 24
End: 2024-11-07
Payer: COMMERCIAL

## 2024-11-07 NOTE — TELEPHONE ENCOUNTER
LVM for patient to call back to schedule new patient Genetics appointment with Dr. Corona, Dr. Landry, Dr. Wilcox, Dr. Hudson, or Dr. Mazariegos, with GC visit prior. When patient calls back, please assist in scheduling IN PERSON appointment.    Please advise patient to complete intake form via [employer],  as well as have outside records/previous genetic test reports sent prior to appointment date. Thank you.

## 2024-11-18 ENCOUNTER — DOCUMENTATION ONLY (OUTPATIENT)
Dept: HEMATOLOGY | Facility: CLINIC | Age: 24
End: 2024-11-18
Payer: COMMERCIAL

## 2024-11-18 NOTE — PROGRESS NOTES
[id number]  Octavio Kirkpatrick  24 year old male  CBCD Diagnosis: Hx of PE/ on LTA  CBCD Provider: Oniel    Patient will need an appointment prior to any refills of Eliquis.  staff has asked for updated contact information as scheduling requests have gone unanswered. RN will make sure they have the following information listed in the chart and we will wait until a refill request is received to try and gather contact information.        Minnie Booth  MSN, RN, PHN  [facility]  Office: [phone]  Fax: [phone]

## 2024-11-24 ENCOUNTER — HOSPITAL ENCOUNTER (EMERGENCY)
Facility: CLINIC | Age: 24
Discharge: HOME | End: 2024-11-25
Attending: EMERGENCY MEDICINE | Admitting: EMERGENCY MEDICINE
Payer: COMMERCIAL

## 2024-11-24 DIAGNOSIS — F90.9 ATTENTION DEFICIT HYPERACTIVITY DISORDER (ADHD), UNSPECIFIED ADHD TYPE: ICD-10-CM

## 2024-11-24 DIAGNOSIS — Q86.0 FETAL ALCOHOL SYNDROME: ICD-10-CM

## 2024-11-24 DIAGNOSIS — F84.0 AUTISM: ICD-10-CM

## 2024-11-24 DIAGNOSIS — F41.9 ANXIETY: ICD-10-CM

## 2024-11-24 PROCEDURE — 99283 EMERGENCY DEPT VISIT LOW MDM: CPT

## 2024-11-24 ASSESSMENT — ACTIVITIES OF DAILY LIVING (ADL)
ADLS_ACUITY_SCORE: 0
ADLS_ACUITY_SCORE: 0

## 2024-11-25 ENCOUNTER — DOCUMENTATION ONLY (OUTPATIENT)
Dept: OTHER | Facility: CLINIC | Age: 24
End: 2024-11-25
Payer: COMMERCIAL

## 2024-11-25 VITALS
BODY MASS INDEX: 24.27 KG/M2 | TEMPERATURE: 97 F | HEART RATE: 72 BPM | DIASTOLIC BLOOD PRESSURE: 66 MMHG | OXYGEN SATURATION: 98 % | RESPIRATION RATE: 16 BRPM | SYSTOLIC BLOOD PRESSURE: 118 MMHG | WEIGHT: 189 LBS

## 2024-11-25 PROCEDURE — 250N000013 HC RX MED GY IP 250 OP 250 PS 637: Performed by: EMERGENCY MEDICINE

## 2024-11-25 RX ORDER — METHYLPHENIDATE HYDROCHLORIDE 18 MG/1
36 TABLET ORAL EVERY MORNING
Status: DISCONTINUED | OUTPATIENT
Start: 2024-11-25 | End: 2024-11-25 | Stop reason: HOSPADM

## 2024-11-25 RX ORDER — SERTRALINE HYDROCHLORIDE 100 MG/1
100 TABLET, FILM COATED ORAL DAILY
Status: DISCONTINUED | OUTPATIENT
Start: 2024-11-25 | End: 2024-11-25 | Stop reason: HOSPADM

## 2024-11-25 RX ORDER — ACETAMINOPHEN 325 MG/1
650 TABLET ORAL EVERY 4 HOURS PRN
Status: DISCONTINUED | OUTPATIENT
Start: 2024-11-25 | End: 2024-11-25 | Stop reason: HOSPADM

## 2024-11-25 RX ORDER — OLANZAPINE 10 MG/1
10 TABLET, ORALLY DISINTEGRATING ORAL 2 TIMES DAILY PRN
Status: DISCONTINUED | OUTPATIENT
Start: 2024-11-25 | End: 2024-11-25 | Stop reason: HOSPADM

## 2024-11-25 RX ORDER — QUETIAPINE FUMARATE 50 MG/1
100 TABLET, FILM COATED ORAL 3 TIMES DAILY
Status: DISCONTINUED | OUTPATIENT
Start: 2024-11-25 | End: 2024-11-25 | Stop reason: HOSPADM

## 2024-11-25 RX ORDER — LISINOPRIL 5 MG/1
5 TABLET ORAL DAILY
Status: DISCONTINUED | OUTPATIENT
Start: 2024-11-25 | End: 2024-11-25 | Stop reason: HOSPADM

## 2024-11-25 RX ORDER — GABAPENTIN 100 MG/1
100 CAPSULE ORAL 3 TIMES DAILY
Status: DISCONTINUED | OUTPATIENT
Start: 2024-11-25 | End: 2024-11-25 | Stop reason: HOSPADM

## 2024-11-25 RX ORDER — TOLTERODINE 4 MG/1
4 CAPSULE, EXTENDED RELEASE ORAL DAILY
Status: DISCONTINUED | OUTPATIENT
Start: 2024-11-25 | End: 2024-11-25 | Stop reason: HOSPADM

## 2024-11-25 RX ORDER — HYDROXYZINE HYDROCHLORIDE 25 MG/1
25 TABLET, FILM COATED ORAL EVERY 4 HOURS PRN
Status: DISCONTINUED | OUTPATIENT
Start: 2024-11-25 | End: 2024-11-25 | Stop reason: HOSPADM

## 2024-11-25 RX ADMIN — TOLTERODINE TARTRATE 4 MG: 4 CAPSULE, EXTENDED RELEASE ORAL at 11:11

## 2024-11-25 RX ADMIN — GABAPENTIN 100 MG: 100 CAPSULE ORAL at 01:38

## 2024-11-25 RX ADMIN — APIXABAN 5 MG: 5 TABLET, FILM COATED ORAL at 01:38

## 2024-11-25 RX ADMIN — SERTRALINE HYDROCHLORIDE 50 MG: 50 TABLET ORAL at 11:10

## 2024-11-25 RX ADMIN — QUETIAPINE FUMARATE 100 MG: 50 TABLET ORAL at 01:38

## 2024-11-25 RX ADMIN — LISINOPRIL 5 MG: 5 TABLET ORAL at 11:10

## 2024-11-25 ASSESSMENT — ACTIVITIES OF DAILY LIVING (ADL)
ADLS_ACUITY_SCORE: 0
ADLS_ACUITY_SCORE: 41
ADLS_ACUITY_SCORE: 0

## 2024-11-25 ASSESSMENT — COLUMBIA-SUICIDE SEVERITY RATING SCALE - C-SSRS
2. HAVE YOU ACTUALLY HAD ANY THOUGHTS OF KILLING YOURSELF?: NO
1. SINCE LAST CONTACT, HAVE YOU WISHED YOU WERE DEAD OR WISHED YOU COULD GO TO SLEEP AND NOT WAKE UP?: NO
ATTEMPT SINCE LAST CONTACT: NO
SUICIDE, SINCE LAST CONTACT: NO
TOTAL  NUMBER OF ABORTED OR SELF INTERRUPTED ATTEMPTS SINCE LAST CONTACT: NO
TOTAL  NUMBER OF INTERRUPTED ATTEMPTS SINCE LAST CONTACT: NO
6. HAVE YOU EVER DONE ANYTHING, STARTED TO DO ANYTHING, OR PREPARED TO DO ANYTHING TO END YOUR LIFE?: NO

## 2024-11-25 NOTE — ED NOTES
Bed: [facility]  Expected date: 11/24/24  Expected time: 9:15 PM  Means of arrival: Ambulance  Comments:  HEMS 446 24M reported SI; unspecified general illness/ on hold

## 2024-11-25 NOTE — DISCHARGE INSTRUCTIONS
We made every effort to try to locate the guardian for this patient.  After several hours of attempting multiple ways of getting a hold of the patient's guardian, it is determined that the patient should be transferred back to his group home.  The group home must get in contact with the guardian to let them know of the patient's visit to the ED and ultimate disposition back to the group home.      Mental health recommendations    It is recommended for group home to contact guardian about recent emergency department visit. Please follow-up with your outpatient team about your visit today.    2.  One of our care coordinators will reach out to you after your discharge.  If you have any questions about additional resources please call our coordinators at # [phone]. If you would like to schedule an appointment for therapy or psychiatry  please call our Behavioral Access Scheduling office at [phone].      3.  Please use aftercare plan and already established coping skills and safety planning as needed.     4.  Please call 911 and/or return to the emergency department if her symptoms worsen or your safety becomes compromised. [facility] crisis line, [phone]      Aftercare Plan      If I am feeling unsafe or I am in a crisis, I will:   Contact my established care providers   Call the National Suicide Prevention Lifeline: 862.458.5355   Go to the nearest emergency room   Call 911   Your [facility] has a mental health crisis team you can call 24/7: [facility], [phone]    Things I am able to do on my own to cope or help me feel better:   -Practice square breathing when I begin to feel anxious - in breath through the nose for the count   of 4 and the first line on the square. Out breath through the mouth for the count of 4 for the second line   of the square. Repeat to complete the square. Repeat the square as many times as needed.    Things that I am able to do with others to cope or  "help me feel better: -Use community resources, including hotline numbers, [facility] crisis and support meetings    Things I can use or do for distraction: -Distraction skills of: going for walks, watching TV, spending time outside, calling a friend or family member  -Download a meditation kajal and spend 15-20 minutes per day mediating/relaxing. Some apps to   download include: Calm, Headspace and Insight Timer. All 3 of these apps have free version    Changes I can make to support my mental health and wellness:   -Attend scheduled mental health therapy and psychiatric appointments and follow all recommendations  -Maintain a daily schedule/routine  -Practice deep breathing skills  -Abstain from all mood altering chemicals not currently prescribed to me     People in my life that I can ask for help: [employer]ness (JESSICA)  918.770.7317 or 1.888.JESSICA.HELPS    Other things that are important when I m in crisis: -Commit to 30 minutes of self care daily - this can be as simple as taking a shower, going for a walk, cooking a meal, read, writing, etc        Crisis Lines  Crisis Text Line  Text 064143  You will be connected with a trained live crisis counselor to provide support.    Por espanol, texto  RAEGAN a 604324 o texto a 442-AYUDAME en WhatsApp    National Hope Line  1.800.SUICIDE [8238310]      Community Resources  Fast Tracker  Linking people to mental health and substance use disorder resources  fasttrackermn.org     [place] Mental [facility] Warm Line  Peer to peer support  Monday thru Saturday, 12 pm to 10 pm  995.700.4773 or 7.123.413.3422  Text \"Support\" to 48053    [employer]ness (JESSICA)  163.158.7203 or 1.888.JESSICA.HELPS        Mental Health Apps  My3  https://my3app.org/    VirtualHopeBox  https://[employer].org/apps/virtual-hope-box/      Additional Information  Today you were seen by a licensed mental health professional through Triage and Transition services, " Behavioral Healthcare Providers ([facility])  for a crisis assessment in the Emergency Department at [facility].  It is recommended that you follow up with your established providers (psychiatrist, mental health therapist, and/or primary care doctor - as relevant) as soon as possible. Coordinators from [facility] will be calling you in the next 24-48 hours to ensure that you have the resources you need.  You can also contact [facility] coordinators directly at [phone]. You may have been scheduled for or offered an appointment with a mental health provider. [facility] maintains an extensive network of licensed behavioral health providers to connect patients with the services they need.  We do not charge providers a fee to participate in our referral network.  We match patients with providers based on a patient's specific needs, insurance coverage, and location.  Our first effort will be to refer you to a provider within your care system, and will utilize providers outside your care system as needed.

## 2024-11-25 NOTE — ED PROVIDER NOTES
Emergency Department Note      History of Present Illness   Chief Complaint   Psychiatric Evaluation    HPI   Octavio Kirkpatrick is a 24 year old male on Eliquis with a history of anxiety, autistic disorder, fetal alcohol syndrome, heart murmur, bipolar disorder and a PE who presents via EMS for a psychiatric evaluation. The patient stated he is feeling overwhelmed. He stated feeling anxious for when his parents find out he's getting his 's license in December. He added they are going to be mad. He denies having a suicide plan or thoughts. He noted he feels safe at home. He is on police hold.     Independent Historian   None    Review of External Notes   I reviewed his ED visit on September 16 at [facility] for suicidal ideation.  Past Medical History   Medical History and Problem List   ADHD  Anxiety  Asthma  Autistic disorder  Fetal alcohol syndrome  Heart murmur  Hernia   Anemia   Tachycardia   Bipolar disorder   PE     Medications   Eliquis  Lisinopril   Albuterol  Gabapentin  Methylphenidate   Quetiapine  Sertraline  Tolterodine     Surgical History   Hernia repair  PE tubes  Tonsillectomy and adenoidectomy  Oscar teeth extraction   Physical Exam   Patient Vitals for the past 24 hrs:   BP Temp Temp src Pulse Resp SpO2 Weight   11/24/24 2121 (!) 143/89 97  F (36.1  C) Temporal 62 18 98 % 85.7 kg (189 lb)     Physical Exam  Constitutional: Vital signs reviewed.  Pleasant.  HEENT: Moist mucous membranes  Cardiovascular: Regular rate and rhythm  Pulmonary/Chest: Breathing comfortably on room air.  No audible wheezing  Musculoskeletal/Extremities: No bony deformities.  Moves all 4 extremities without difficulty.  Neurological: Alert.  No focal deficits.  Endo: No pitting edema  Skin: No visible rash.  Psychiatric: Pleasant. Anxious. Admits to feeling overwhelmed. Denies HI and SI.   Diagnostics   Lab Results   Labs Ordered and Resulted from Time of ED Arrival to Time of ED Departure - No data to  display    Imaging   No orders to display       Independent Interpretation   None  ED Course    Medications Administered   Medications   apixaban ANTICOAGULANT (ELIQUIS) tablet 5 mg (has no administration in time range)   gabapentin (NEURONTIN) capsule 100 mg (has no administration in time range)   lisinopril (ZESTRIL) tablet 5 mg (has no administration in time range)   methylphenidate HCl ER (OSM) (CONCERTA) CR tablet 36 mg (has no administration in time range)   QUEtiapine (SEROquel) tablet 100 mg (has no administration in time range)   sertraline (ZOLOFT) tablet 100 mg (has no administration in time range)   sertraline (ZOLOFT) tablet 50 mg (has no administration in time range)   tolterodine ER (DETROL LA) 24 hr capsule 4 mg (has no administration in time range)   acetaminophen (TYLENOL) tablet 650 mg (has no administration in time range)   hydrOXYzine HCl (ATARAX) tablet 25 mg (has no administration in time range)   OLANZapine zydis (zyPREXA) ODT tab 10 mg (has no administration in time range)   melatonin tablet 3 mg (has no administration in time range)   nicotine (NICORETTE) gum 2 mg (has no administration in time range)       Procedures   Procedures     Discussion of Management   ED Mental Health, DEC     ED Course   ED Course as of 11/25/24 0058   Sun Nov 24, 2024 2126 I obtained history and examined the patient as noted above.      Additional Documentation  None  Medical Decision Making / Diagnosis   CMS Diagnoses: None    MIPS       None    MDM   Octavio Kirkpatrick is a 24 year old male who presents from his group home stating he feels overwhelmed there.  He tells me he just needed a sensory break as it was getting too loud in there.  He has been seen in the emergency room multiple times.  To me he denies suicidal ideation or any thoughts of harming himself.  He states that he is overwhelmed also because he is wanting to get his 's license that he knows his parents are not cannot be  supportive of that and that is causing stress.  He is safe at his group home and is requesting to go back.  I did review the police DK and apparently was making suicidal statements and had a knife on him.  He is denying suicidality at this time.  He did meet with the DEC  who also recommends discharge.  He is just feeling overwhelmed and completely denies any suicidal ideation.  He was actually with employees from the group home when he was at the store about the knife.  They are the ones that called the police.    Unfortunately there is confusion as to who has guardianship over Nick.  The last contacted provider from [facility] was not to be contacted and the DEC  was informed that the person is no longer with [facility].  As such, pulmonary tried to discharge him, would not be able to do so until we have proof of guardianship and agreement from the guardian that he go back to his group home.  I have ordered his home medications.  He has been calm and cooperative here.  It is unlikely that this will be settled overnight and will require work in the morning.  He will be signed out to my colleague, Dr. Taylor pending proof of guardianship.    Disposition   The patient will ultimately be discharged back to his group home once we are able to get in contact with his legal guardian which will likely happen in the morning.    Diagnosis     ICD-10-CM    1. Anxiety  F41.9       2. Autism  F84.0       3. Fetal alcohol syndrome  Q86.0       4. Attention deficit hyperactivity disorder (ADHD), unspecified ADHD type  F90.9            Discharge Medications   New Prescriptions    No medications on file     Scribe Disclosure:  I, Romy Acevedo, am serving as a scribe at 9:38 PM on 11/24/2024 to document services personally performed by Brandon Recio MD based on my observations and the provider's statements to me.      Brandon Recio MD  11/25/24 005
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
## 2024-11-25 NOTE — ED PROVIDER NOTES
The patient was signed out to me by Dr. Taylor at 7 AM shift change.  At 11:10 AM I was contacted by the DEC staff who states they are unable to get a hold of the guardian for this patient.  They are recommending the patient be discharged back to the group home.  The group home will contact the guardian and inform them of the patient's ER visit.     Leonel Templeton MD  11/25/24 [id number]

## 2024-11-25 NOTE — PLAN OF CARE
"Octavio Kirkpatrick  November 25, 2024  Plan of Care Hand-off Note     Patient Care Path: discharge    Plan for Care:   Patient presents to the ED tonight after feeling \"overwhelmed.\" Patient shared he is getting his 's license in December and the thought of his parents finding out overwhelmed him. He shared he is adopted and his parents have not been supportive of him about things in general and he worries what will happen if he shows up to the holiday family gatherings in a car. Patient shared he has also \"not been feeling well\" recently. Patient denied suicide ideation, homicide ideation, substance use, self-harm and auditory/visual hallucinations. Given patient's presenting symptoms, collateral information, identified goal, and engagement in safety planning, recommendation is for patient to discharge back to group [facility]. Due to  inability to confirm patient's current guardian, patient is unable to be discharged at this time.    Identified Goals and Safety Issues:  [facility] was contacted at [phone]). Bay confirmed patient can return to group [facility] once discharged and he can be contacted to facilitate this transportation. Guardian provided by [facility], Vance De Jesus (Office: [phone]; cell: [phone]), of [facility] is no longer working for [facility] [employer].  contacted Brandie Whipple (contact left in voicemail message on Vance's phone) at [phone] to inquire about patient's current guardian. No answer. Follow up to confirm guardianship and confirm patient's discharge from hospital.     Legal Status: Legal Status at Admission: Guardian/ad litum    Psychiatry Consult: Not ordered.    Updated RN & attending regarding plan of care.      Kate Paige, MADIHA, LGSW, Psychotherapist Trainee  "

## 2024-11-25 NOTE — ED TRIAGE NOTES
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  Per PD, text messages stated SI.  Pt states, he was just \"overwhelmed\" at the time and denies SI and HI currently.        "

## 2024-11-25 NOTE — ED NOTES
Pt is asking for his phone, he wanted to watch a movie.  Pt was offered a movie on his TV, but he declined.  Patient was given food and offered PRNs.  Pt declined PRNs but took food.

## 2024-11-26 ENCOUNTER — TELEPHONE (OUTPATIENT)
Dept: BEHAVIORAL HEALTH | Facility: CLINIC | Age: 24
End: 2024-11-26
Payer: COMMERCIAL

## 2024-11-26 NOTE — TELEPHONE ENCOUNTER
Spoke with PT's guardian about follow-up care. Offered additional appts/resources and guardian declined. No further follow-up needed. EmPATH number provided if they would like additional assistance.

## 2024-12-08 NOTE — DISCHARGE INSTRUCTIONS
What do you do next:   Continue your home medications unless we have specifically changed them  No external signs of abscess, hemorrhoid, or fissure were seen on exam today.  It is certainly possible there could be an abscess that I am unable to visualize.  A CT scan of the abdomen/pelvis with IV contrast would be ideal in that particular case.  After our discussion, you would rather follow-up with your pelvic floor therapist first and talk about symptoms.  You can use over-the-counter acetaminophen (Tylenol ) and ibuprofen for fever or pain control as applicable to your visit today.  Acetaminophen (Tylenol): Take 500 to 1000 mg by mouth every 6 hours as needed for fever or pain.  Do not take more than 4000 total milligrams of acetaminophen-containing products in a 24-hour timeframe.  Ibuprofen: Take 600 milligrams by mouth every 6-8 hours as needed for fever or pain.  Take this with food or milk to avoid stomach upset.  Follow up as indicated below    When do you return: If you have uncontrollable fevers, uncontrollable pain, bloody stools, uncontrollable nausea/vomiting, uncontrolled abdominal pain, or any other symptoms that concern you please return for reevaluation.    Thank you for allowing us to care for you today.     Management per primary service.

## 2025-01-02 DIAGNOSIS — I10 ESSENTIAL HYPERTENSION: ICD-10-CM

## 2025-01-02 RX ORDER — LISINOPRIL 5 MG/1
5 TABLET ORAL DAILY
Qty: 28 TABLET | Refills: 2 | Status: SHIPPED | OUTPATIENT
Start: 2025-01-02

## 2025-01-17 ENCOUNTER — HOSPITAL ENCOUNTER (EMERGENCY)
Facility: CLINIC | Age: 25
Discharge: HOME | End: 2025-01-18
Attending: EMERGENCY MEDICINE | Admitting: EMERGENCY MEDICINE
Payer: COMMERCIAL

## 2025-01-17 VITALS
SYSTOLIC BLOOD PRESSURE: 113 MMHG | BODY MASS INDEX: 25.24 KG/M2 | HEART RATE: 129 BPM | OXYGEN SATURATION: 96 % | WEIGHT: 196.65 LBS | DIASTOLIC BLOOD PRESSURE: 64 MMHG | TEMPERATURE: 98 F | RESPIRATION RATE: 18 BRPM | HEIGHT: 74 IN

## 2025-01-17 DIAGNOSIS — R00.2 PALPITATIONS: ICD-10-CM

## 2025-01-17 LAB
ANION GAP SERPL CALCULATED.3IONS-SCNC: 12 MMOL/L (ref 7–15)
BASOPHILS # BLD AUTO: 0 10E3/UL (ref 0–0.2)
BASOPHILS NFR BLD AUTO: 1 %
BUN SERPL-MCNC: 12.7 MG/DL (ref 6–20)
CALCIUM SERPL-MCNC: 9.3 MG/DL (ref 8.8–10.4)
CHLORIDE SERPL-SCNC: 106 MMOL/L (ref 98–107)
CREAT SERPL-MCNC: 1.54 MG/DL (ref 0.67–1.17)
EGFRCR SERPLBLD CKD-EPI 2021: 64 ML/MIN/1.73M2
EOSINOPHIL # BLD AUTO: 0.1 10E3/UL (ref 0–0.7)
EOSINOPHIL NFR BLD AUTO: 2 %
ERYTHROCYTE [DISTWIDTH] IN BLOOD BY AUTOMATED COUNT: 12 % (ref 10–15)
GLUCOSE SERPL-MCNC: 96 MG/DL (ref 70–99)
HCO3 SERPL-SCNC: 22 MMOL/L (ref 22–29)
HCT VFR BLD AUTO: 49.3 % (ref 40–53)
HGB BLD-MCNC: 17.3 G/DL (ref 13.3–17.7)
HOLD SPECIMEN: NORMAL
HOLD SPECIMEN: NORMAL
IMM GRANULOCYTES # BLD: 0 10E3/UL
IMM GRANULOCYTES NFR BLD: 0 %
LYMPHOCYTES # BLD AUTO: 2 10E3/UL (ref 0.8–5.3)
LYMPHOCYTES NFR BLD AUTO: 41 %
MAGNESIUM SERPL-MCNC: 1.9 MG/DL (ref 1.7–2.3)
MCH RBC QN AUTO: 30.9 PG (ref 26.5–33)
MCHC RBC AUTO-ENTMCNC: 35.1 G/DL (ref 31.5–36.5)
MCV RBC AUTO: 88 FL (ref 78–100)
MONOCYTES # BLD AUTO: 0.3 10E3/UL (ref 0–1.3)
MONOCYTES NFR BLD AUTO: 6 %
NEUTROPHILS # BLD AUTO: 2.4 10E3/UL (ref 1.6–8.3)
NEUTROPHILS NFR BLD AUTO: 50 %
NRBC # BLD AUTO: 0 10E3/UL
NRBC BLD AUTO-RTO: 0 /100
NT-PROBNP SERPL-MCNC: <36 PG/ML (ref 0–450)
PLATELET # BLD AUTO: 358 10E3/UL (ref 150–450)
POTASSIUM SERPL-SCNC: 4.1 MMOL/L (ref 3.4–5.3)
RBC # BLD AUTO: 5.6 10E6/UL (ref 4.4–5.9)
SODIUM SERPL-SCNC: 140 MMOL/L (ref 135–145)
TROPONIN T SERPL HS-MCNC: <6 NG/L
TSH SERPL DL<=0.005 MIU/L-ACNC: 0.6 UIU/ML (ref 0.3–4.2)
WBC # BLD AUTO: 4.9 10E3/UL (ref 4–11)

## 2025-01-17 PROCEDURE — 93005 ELECTROCARDIOGRAM TRACING: CPT

## 2025-01-17 PROCEDURE — 99284 EMERGENCY DEPT VISIT MOD MDM: CPT

## 2025-01-17 PROCEDURE — 36415 COLL VENOUS BLD VENIPUNCTURE: CPT | Performed by: EMERGENCY MEDICINE

## 2025-01-17 PROCEDURE — 84484 ASSAY OF TROPONIN QUANT: CPT | Performed by: EMERGENCY MEDICINE

## 2025-01-17 PROCEDURE — 83880 ASSAY OF NATRIURETIC PEPTIDE: CPT | Performed by: EMERGENCY MEDICINE

## 2025-01-17 PROCEDURE — 83735 ASSAY OF MAGNESIUM: CPT | Performed by: EMERGENCY MEDICINE

## 2025-01-17 PROCEDURE — 82374 ASSAY BLOOD CARBON DIOXIDE: CPT | Performed by: EMERGENCY MEDICINE

## 2025-01-17 PROCEDURE — 85025 COMPLETE CBC W/AUTO DIFF WBC: CPT | Performed by: EMERGENCY MEDICINE

## 2025-01-17 PROCEDURE — 84443 ASSAY THYROID STIM HORMONE: CPT | Performed by: EMERGENCY MEDICINE

## 2025-01-17 PROCEDURE — 80048 BASIC METABOLIC PNL TOTAL CA: CPT | Performed by: EMERGENCY MEDICINE

## 2025-01-17 ASSESSMENT — COLUMBIA-SUICIDE SEVERITY RATING SCALE - C-SSRS
2. HAVE YOU ACTUALLY HAD ANY THOUGHTS OF KILLING YOURSELF IN THE PAST MONTH?: NO
6. HAVE YOU EVER DONE ANYTHING, STARTED TO DO ANYTHING, OR PREPARED TO DO ANYTHING TO END YOUR LIFE?: NO
1. IN THE PAST MONTH, HAVE YOU WISHED YOU WERE DEAD OR WISHED YOU COULD GO TO SLEEP AND NOT WAKE UP?: NO

## 2025-01-18 NOTE — ED TRIAGE NOTES
While pt was laying on the couch at around 8pm he noted is heart rate had increased. Pt denies chest pain, hx of PE and is on eliquis

## 2025-01-18 NOTE — ED PROVIDER NOTES
"  Emergency Department Note      History of Present Illness     Chief Complaint   Palpitations      HPI   Octavio Kirkpatrick is a 24 year old male who presents to the ED for evaluation of palpitations. The patient reports that he began to have palpitations around 7 PM last evening and it lasted for a couple of hours. He currently does not feel like he has any palpitations. He notes that he has a history of PE and has not missed any doses of his Eliquis. Denies chest pain.     Independent Historian   None      Past Medical History     Medical History and Problem List   ADHD  Anxiety  Asthma  Depressive disorder  Hernia  PE    Medications   Albuterol  Eliquis  Gabapentin  Lisinopril  Concerta  Seroquel  Atarax  Ditropan  Mobic  Abilify    Surgical History   Inguinal hernia repair    Physical Exam     Patient Vitals for the past 24 hrs:   BP Temp Temp src Pulse Resp SpO2 Height Weight   01/17/25 2104 113/64 98  F (36.7  C) Temporal (!) 129 18 96 % 1.88 m (6' 2\") 89.2 kg (196 lb 10.4 oz)     Physical Exam  General: Patient is awake, alert  Head: The scalp, face, and head appear normal  Eyes: The pupils are equal, round, and reactive to light. Conjunctivae and sclerae are normal  ENT: External acoustic canals are normal. The oropharynx is normal without erythema. Uvula is in the midline  Neck: Normal range of motion.   CV: Regular rate and rhythm.   Resp: Lungs are clear without wheezes or rales. No respiratory distress.   GI: Abdomen is soft, no rigidity, guarding, or rebound. No distension. No tenderness to palpation in any quadrant.    MS: Normal tone. Joints grossly normal without effusions. No asymmetric leg swelling, calf or thigh tenderness.    Skin: No rash or lesions noted. Normal capillary refill noted  Neuro: Speech is normal and fluent. Face is symmetric. Moving all extremities.   Psych:  Normal affect.  Appropriate interactions.    Diagnostics     Lab Results   Labs Ordered and Resulted from Time of ED " Arrival to Time of ED Departure   BASIC METABOLIC PANEL - Abnormal       Result Value    Sodium 140      Potassium 4.1      Chloride 106      Carbon Dioxide (CO2) 22      Anion Gap 12      Urea Nitrogen 12.7      Creatinine 1.54 (*)     GFR Estimate 64      Calcium 9.3      Glucose 96     TROPONIN T, HIGH SENSITIVITY - Normal    Troponin T, High Sensitivity <6     NT PROBNP INPATIENT - Normal    N terminal Pro BNP Inpatient <36     TSH WITH FREE T4 REFLEX - Normal    TSH 0.60     MAGNESIUM - Normal    Magnesium 1.9     CBC WITH PLATELETS AND DIFFERENTIAL    WBC Count 4.9      RBC Count 5.60      Hemoglobin 17.3      Hematocrit 49.3      MCV 88      MCH 30.9      MCHC 35.1      RDW 12.0      Platelet Count 358      % Neutrophils 50      % Lymphocytes 41      % Monocytes 6      % Eosinophils 2      % Basophils 1      % Immature Granulocytes 0      NRBCs per 100 WBC 0      Absolute Neutrophils 2.4      Absolute Lymphocytes 2.0      Absolute Monocytes 0.3      Absolute Eosinophils 0.1      Absolute Basophils 0.0      Absolute Immature Granulocytes 0.0      Absolute NRBCs 0.0         EKG   ECG taken at 20:55, ECG read at 00:35  Normal sinus rhythm  ST & T wave abnormality, consider inferior ischemia  ST & T wave abnormality, consider anterolateral ischemia   Rate 94 bpm. ME interval 142 ms. QRS duration 88 ms. QT/QTc 334/417 ms. P-R-T axes 67 65 -51.    Independent Interpretation   None    ED Course        ED Course   ED Course as of 01/18/25 0644   Sat Jan 18, 2025   0037 I obtained history and examined the patient as noted above.        Additional Documentation  None    Medical Decision Making / Diagnosis     CMS Diagnoses: None    MIPS   None    [facility]   Octavio Kirkpatrick is a 24 year old male who presents for evaluation of palpitations.  Initial ECG shows normal sinus rhythm without evidence of dysrhythmia.  He has some chronic EKG changes but my suspicion for acute ischemia is low.  Blood work is otherwise reassuring.   Troponin is undetectably low.  Low concern for acute coronary syndrome, significant myocarditis or pericarditis..  A broad differential diagnosis was considered including SVT, Atrial fibrillation, ventricular arrhythmia, thyroid disease, acute electrolyte abnormality,  drugs/medications, caffeine intake or other stimulants, medication side effect, anemia, heart disease, PE, etc.  The workup and exam here in ED would indicate that supportive outpatient management is indicated.  I doubt PE as patient anticoagulated and has been compliant..  Doubt acute coronary syndrome given symptoms and exam.  Will have them follow up with primary care team.       Disposition   The patient was discharged.     Diagnosis     ICD-10-CM    1. Palpitations  R00.2          Scribe Disclosure:  I, Michael Cartagena, am serving as a scribe at 12:52 AM on 1/18/2025 to document services personally performed by Zoltan Stephens MD based on my observations and the provider's statements to me.        Zoltan Stephens MD  01/18/25 0645

## 2025-01-18 NOTE — ED NOTES
Pt complaining of bug bites on arm. Writer is not seeing any bug bites at this moment. Pt asking about documentation to send back to the group home about the roaches.   Satish Cuevas on 1/17/2025 at 11:17 PM

## 2025-01-20 LAB
ATRIAL RATE - MUSE: 94 BPM
DIASTOLIC BLOOD PRESSURE - MUSE: NORMAL MMHG
INTERPRETATION ECG - MUSE: NORMAL
P AXIS - MUSE: 67 DEGREES
PR INTERVAL - MUSE: 142 MS
QRS DURATION - MUSE: 88 MS
QT - MUSE: 334 MS
QTC - MUSE: 417 MS
R AXIS - MUSE: 65 DEGREES
SYSTOLIC BLOOD PRESSURE - MUSE: NORMAL MMHG
T AXIS - MUSE: -51 DEGREES
VENTRICULAR RATE- MUSE: 94 BPM

## 2025-04-16 DIAGNOSIS — I10 ESSENTIAL HYPERTENSION: ICD-10-CM

## 2025-04-16 RX ORDER — LISINOPRIL 5 MG/1
5 TABLET ORAL
Qty: 28 TABLET | Refills: 4 | Status: SHIPPED | OUTPATIENT
Start: 2025-04-16

## 2025-04-20 ENCOUNTER — HEALTH MAINTENANCE LETTER (OUTPATIENT)
Age: 25
End: 2025-04-20

## 2025-05-08 DIAGNOSIS — I26.02 ACUTE SADDLE PULMONARY EMBOLISM WITH ACUTE COR PULMONALE (H): ICD-10-CM

## 2025-05-22 ENCOUNTER — VIRTUAL VISIT (OUTPATIENT)
Dept: HEMATOLOGY | Facility: CLINIC | Age: 25
End: 2025-05-22
Attending: INTERNAL MEDICINE
Payer: COMMERCIAL

## 2025-05-22 DIAGNOSIS — I26.02 ACUTE SADDLE PULMONARY EMBOLISM WITH ACUTE COR PULMONALE (H): ICD-10-CM

## 2025-05-22 NOTE — PROGRESS NOTES
[facility]  [address] [place] [id number]  Phone: [phone], Fax: [phone]      Outpatient Visit Note:    Patient: Octavio Kirkpatrick  MRN: [id number]  : 2000  FRANCHESKA: May 22, 2025    Octavio Kirkpatrick is a 24 year old man with a history of unprovoked pulmonary embolism on indefinite anticoagulation for secondary prophylaxis who returns for routine follow up.      Assessment:  In summary, Octavio Kirkpatrick is a 24 year old man with history of unprovoked pulmonary embolism in , has completed treatment and is currently on long-term secondary prophylaxis, and again it appears the benefits of anticoagulation outweigh the risks so we will continue long-term anticoagulation.     He has had no bleeding events and his risk factors for recurrence include male sex and unprovoked pulmonary embolism, which put him at significantly high risk for recurrence.  We again discussed my estimation of about 40 to 50% chance of recurrence over the next 10 to 15 years without secondary prophylaxis compared to a minimal risk of bleeding given his young age and safety of this medication.    Recommendations:  I counseled the patient about my assessment of risk and benefits of continued anticoagulation for secondary prophylaxis as above  I renewed his prescription for apixaban 5 mg twice a day  I will plan to see him back in 1 year.  He will call with any questions or concerns in the interim    20 minutes spent by me on the date of the encounter doing chart review, patient visit, and documentation       Jeremiah Engle MD  Associate Professor of Medicine, Division of Hematology, Oncology and Transplantation  [employer]     --------------------------------------------------------  Forward History:  2020 presented with dyspnea on exertion ad hypoxia, initially concern was for COVID-19 pneumonia, later found to have large PEs, treated with lytics.   APS testing negtive  Treated with warfarin (cost concerns initially) until Nov 2022 when he established care here and changed to apixaban (rivaroxaban not a good fit due concerns about taking with food)    History: Octavio Kirkpatrick is a 24 year old man with history of autism and fetal alcohol syndrome, along with a history of unprovoked pulmonary embolism in June 2020.  He has been on apixaban for secondary prophylaxis since then.    Today he reports he is doing well.  He said no problems with adherence.  No issues with bleeding or cost.  He is not anticipating any surgeries or procedures.  He has not intentionally interrupted anticoagulation since I saw him last.  We again discussed his risk for recurrence and long-term plan for secondary prophylaxis    Medications are reviewed in the EMR and notable for apixaban 5 mg twice a day    Objective:  Exam:  There is no height or weight on file to calculate BMI.   Constitutional: Appears well, no distress  Eyes: no discharge, injection or icterus  Respiratory: no cough or labored breathing  Skin: no rashes or petechiae  Neurological: no deficits appreciated, speech is fluent  Psych: affect is normal    Labs:  none    Imaging:  No new imaging

## 2025-05-28 ENCOUNTER — HOSPITAL ENCOUNTER (EMERGENCY)
Facility: CLINIC | Age: 25
Discharge: HOME | End: 2025-05-29
Attending: EMERGENCY MEDICINE
Payer: COMMERCIAL

## 2025-05-28 DIAGNOSIS — T14.91XA SUICIDAL BEHAVIOR WITH ATTEMPTED SELF-INJURY (H): ICD-10-CM

## 2025-05-28 PROCEDURE — 99285 EMERGENCY DEPT VISIT HI MDM: CPT

## 2025-05-28 PROCEDURE — 250N000013 HC RX MED GY IP 250 OP 250 PS 637: Performed by: EMERGENCY MEDICINE

## 2025-05-28 RX ORDER — TOLTERODINE 4 MG/1
4 CAPSULE, EXTENDED RELEASE ORAL DAILY
Status: DISCONTINUED | OUTPATIENT
Start: 2025-05-29 | End: 2025-05-29 | Stop reason: HOSPADM

## 2025-05-28 RX ORDER — LISINOPRIL 5 MG/1
5 TABLET ORAL DAILY
Status: DISCONTINUED | OUTPATIENT
Start: 2025-05-29 | End: 2025-05-29 | Stop reason: HOSPADM

## 2025-05-28 RX ORDER — METHYLPHENIDATE HYDROCHLORIDE 36 MG/1
36 TABLET ORAL EVERY MORNING
Refills: 0 | Status: DISCONTINUED | OUTPATIENT
Start: 2025-05-29 | End: 2025-05-29 | Stop reason: HOSPADM

## 2025-05-28 RX ORDER — QUETIAPINE FUMARATE 50 MG/1
100 TABLET, FILM COATED ORAL 3 TIMES DAILY
Status: DISCONTINUED | OUTPATIENT
Start: 2025-05-28 | End: 2025-05-29 | Stop reason: HOSPADM

## 2025-05-28 RX ORDER — SERTRALINE HYDROCHLORIDE 100 MG/1
100 TABLET, FILM COATED ORAL DAILY
Status: DISCONTINUED | OUTPATIENT
Start: 2025-05-29 | End: 2025-05-29 | Stop reason: HOSPADM

## 2025-05-28 RX ORDER — GABAPENTIN 100 MG/1
100 CAPSULE ORAL 3 TIMES DAILY
Status: DISCONTINUED | OUTPATIENT
Start: 2025-05-28 | End: 2025-05-29 | Stop reason: HOSPADM

## 2025-05-28 RX ADMIN — GABAPENTIN 100 MG: 100 CAPSULE ORAL at 20:31

## 2025-05-28 RX ADMIN — APIXABAN 5 MG: 5 TABLET, FILM COATED ORAL at 20:31

## 2025-05-28 RX ADMIN — QUETIAPINE FUMARATE 100 MG: 100 TABLET ORAL at 20:31

## 2025-05-28 ASSESSMENT — ACTIVITIES OF DAILY LIVING (ADL)
ADLS_ACUITY_SCORE: 41

## 2025-05-28 NOTE — ED TRIAGE NOTES
Pt arrives with EMS on a hold. [place] police stopped pt walking down the street and reported pt started making threats of hurting himself with a plastic knife and ceramic plate.      Triage Assessment (Adult)       Row Name 05/28/25 1811 05/28/25 1810       Cognitive/Neuro/Behavioral WDL    Cognitive/Neuro/Behavioral WDL X;mood/behavior X;mood/behavior    Mood/Behavior restless;anxious agitated;restless;anxious       [place] Coma Scale    Best Eye Response -- 4-->(E4) spontaneous    Best Motor Response -- 6-->(M6) obeys commands    Best Verbal Response -- 5-->(V5) oriented    Inocencio Coma Scale Score -- 15      Row Name 05/28/25 1809          Triage Assessment    Airway WDL WDL        Respiratory WDL    Respiratory WDL WDL        Skin Circulation/Temperature WDL    Skin Circulation/Temperature WDL WDL        Cardiac WDL    Cardiac WDL WDL        Peripheral/Neurovascular WDL    Peripheral Neurovascular WDL WDL        Cognitive/Neuro/Behavioral WDL    Cognitive/Neuro/Behavioral WDL WDL
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
## 2025-05-28 NOTE — PHARMACY-ADMISSION MEDICATION HISTORY
Pharmacist Admission Medication History    Admission medication history is complete. The information provided in this note is only as accurate as the sources available at the time of the update.    Information Source(s): Patient via in-person    Pertinent Information: Pt states he sleeps more when his mental health is declining, states he did not have meds today, could not recall if he took them yesterday or not.    Changes made to PTA medication list:  Added: None  Deleted: duplicate Ventolin  Changed: None    Allergies reviewed with patient and updates made in EHR: yes    Medication History Completed By: Ban Pritchard [facility] 5/28/2025 6:51 PM    PTA Med List   Medication Sig Last Dose/Taking    albuterol (VENTOLIN HFA) 108 (90 Base) MCG/ACT inhaler Inhale into the lungs. Taking    apixaban ANTICOAGULANT (ELIQUIS) 5 MG tablet Take 1 tablet (5 mg) by mouth 2 times daily. Past Week    Ascorbic Acid 100 MG CHEW Take 100 mg by mouth daily. Past Week    gabapentin (NEURONTIN) 100 MG capsule Take 100 mg by mouth 3 times daily. Past Week    lisinopril (ZESTRIL) 5 MG tablet TAKE 1 TABLET BY MOUTH EVERY DAY Past Week    methylphenidate (CONCERTA) 36 MG CR tablet Take 36 mg by mouth every morning Past Week    QUEtiapine (SEROQUEL) 100 MG tablet Take 100 mg by mouth 3 times daily. Past Week    sertraline (ZOLOFT) 100 MG tablet Take 100 mg by mouth daily. Take with 50mg tablet for total dose of 150mg Past Week    sertraline (ZOLOFT) 50 MG tablet Take 50 mg by mouth daily. Take with 100mg tablet for total dose of 150mg Past Week    tolterodine ER (DETROL LA) 4 MG 24 hr capsule Take 4 mg by mouth daily Past Week    Vitamin D3 (VITAMIN D) 10 MCG (400 UNIT) tablet  Past Week

## 2025-05-28 NOTE — ED NOTES
Pt not ready for DEC assessment at 1819. [facility] staff will call DEC when pt is able to participate.

## 2025-05-29 VITALS
RESPIRATION RATE: 18 BRPM | WEIGHT: 210.1 LBS | SYSTOLIC BLOOD PRESSURE: 135 MMHG | OXYGEN SATURATION: 97 % | DIASTOLIC BLOOD PRESSURE: 83 MMHG | BODY MASS INDEX: 26.98 KG/M2 | TEMPERATURE: 98.2 F | HEART RATE: 77 BPM

## 2025-05-29 PROBLEM — F39 MOOD DISORDER: Chronic | Status: ACTIVE | Noted: 2025-05-29

## 2025-05-29 PROCEDURE — 99245 OFF/OP CONSLTJ NEW/EST HI 55: CPT | Performed by: PSYCHIATRY & NEUROLOGY

## 2025-05-29 PROCEDURE — 250N000013 HC RX MED GY IP 250 OP 250 PS 637: Performed by: EMERGENCY MEDICINE

## 2025-05-29 RX ADMIN — LISINOPRIL 5 MG: 5 TABLET ORAL at 07:51

## 2025-05-29 RX ADMIN — QUETIAPINE FUMARATE 100 MG: 100 TABLET ORAL at 07:50

## 2025-05-29 RX ADMIN — METHYLPHENIDATE HYDROCHLORIDE 36 MG: 36 TABLET, EXTENDED RELEASE ORAL at 07:52

## 2025-05-29 RX ADMIN — SERTRALINE HYDROCHLORIDE 100 MG: 100 TABLET ORAL at 07:51

## 2025-05-29 RX ADMIN — SERTRALINE HYDROCHLORIDE 50 MG: 50 TABLET ORAL at 07:50

## 2025-05-29 RX ADMIN — APIXABAN 5 MG: 5 TABLET, FILM COATED ORAL at 07:50

## 2025-05-29 RX ADMIN — GABAPENTIN 100 MG: 100 CAPSULE ORAL at 07:51

## 2025-05-29 ASSESSMENT — ACTIVITIES OF DAILY LIVING (ADL)
ADLS_ACUITY_SCORE: 41

## 2025-05-29 ASSESSMENT — COLUMBIA-SUICIDE SEVERITY RATING SCALE - C-SSRS
TOTAL  NUMBER OF INTERRUPTED ATTEMPTS SINCE LAST CONTACT: NO
2. HAVE YOU ACTUALLY HAD ANY THOUGHTS OF KILLING YOURSELF?: NO
1. SINCE LAST CONTACT, HAVE YOU WISHED YOU WERE DEAD OR WISHED YOU COULD GO TO SLEEP AND NOT WAKE UP?: NO
6. HAVE YOU EVER DONE ANYTHING, STARTED TO DO ANYTHING, OR PREPARED TO DO ANYTHING TO END YOUR LIFE?: NO
TOTAL  NUMBER OF ABORTED OR SELF INTERRUPTED ATTEMPTS SINCE LAST CONTACT: NO
ATTEMPT SINCE LAST CONTACT: NO

## 2025-05-29 NOTE — ED NOTES
RN ED Mental Health Handoff Note    DK    Does patient require 1:1? Yes    Hold and rights been given and documented for patient: Yes    Is the patient in BH scrubs? No -own clothes    Has the patient been searched? Yes    Is the 15 minute observation tool up to date? Yes    Was patient issued a welcome folder? Yes    Room check completed this shift: Yes    PSS3 and Beggs Assessment/Reassessment this shift:    C-SSRS (Beggs)      Date and Time Q1 Wished to be Dead (Past Month) Q2 Suicidal Thoughts (Past Month) Q3 Suicidal Thought Method Q4 Suicidal Intent without Specific Plan Q5 Suicide Intent with Specific Plan Q6 Suicide Behavior (Lifetime) If yes to Q6, within past 3 months? Level of Risk per Screen Level of Risk per Screen User   05/28/25 2135 1-->yes 1-->yes 1-->yes 0-->no 0-->no 1-->yes 1-->yes -- high risk MC   05/28/25 1817 0-->no 0-->no -- -- -- 1-->yes 0-->no -- moderate risk RLG            Behavioral status of patient: Green    Code 21 called this shift? No    Use of restraints/seclusion this shift? No    Most recent vital signs:  Temp: 97.8  F (36.6  C)   BP: 124/89 Pulse: 86   Resp: 20 SpO2: 97 % O2 Device: None (Room air)      Medications:  Scheduled medication compliance? N/A    PRN Meds administered this shift? No    Medications   apixaban ANTICOAGULANT (ELIQUIS) tablet 5 mg (5 mg Oral $Given 5/28/25 2031)   gabapentin (NEURONTIN) capsule 100 mg (100 mg Oral $Given 5/28/25 2031)   lisinopril (ZESTRIL) tablet 5 mg (has no administration in time range)   methylphenidate HCl ER (OSM) (CONCERTA) CR tablet 36 mg (has no administration in time range)   QUEtiapine (SEROquel) tablet 100 mg (100 mg Oral $Given 5/28/25 2031)   sertraline (ZOLOFT) tablet 100 mg (has no administration in time range)   sertraline (ZOLOFT) tablet 50 mg (has no administration in time range)   tolterodine ER (DETROL LA) 24 hr capsule 4 mg (has no administration in time range)   melatonin tablet 3 mg (has no administration in  time range)         ADLs    Meal Provided this shift? No    Hygiene items provided? No    ADLs completed? No    Date of last shower: pta    Any significant events this shift? No    Any information that would be helpful in caring for this patient?  N/a    Family present/updated? No    Location of patient's belongings: w/ pt    Critical Care Minutes:  Does the patient need critical care minutes documented? No

## 2025-05-29 NOTE — ED NOTES
After hours conservator number no longer valid.  Writer calls [facility].  They do not have number.      Patient Nick did finally calm but does easily re escalate this encounter, needs to be redirected repeatedly.      Writer agrees with group home staff person Dax; the decision to have Nick return to group home should NOT be made tonight-- and cannot without guardian regardless.  Patient still easily dysregulated , upset by situation.      See full assessment.

## 2025-05-29 NOTE — ED NOTES
RN ED Mental Health Handoff Note    Voluntary-pt has a guardian, but we have not been able to reach him yet    Does patient require 1:1? Yes    Hold and rights been given and documented for patient: Yes    Is the patient in BH scrubs? No -street clothes    Has the patient been searched? Yes    Is the 15 minute observation tool up to date? Yes    Was patient issued a welcome folder? Yes    Room check completed this shift: Yes    PSS3 and [place] Assessment/Reassessment this shift:    C-SSRS ([place]ume)      Date and Time Q1 Wished to be Dead (Past Month) Q2 Suicidal Thoughts (Past Month) Q3 Suicidal Thought Method Q4 Suicidal Intent without Specific Plan Q5 Suicide Intent with Specific Plan Q6 Suicide Behavior (Lifetime) If yes to Q6, within past 3 months? Level of Risk per Screen Level of Risk per Screen User   05/28/25 2135 1-->yes 1-->yes 1-->yes 0-->no 0-->no 1-->yes 1-->yes -- high risk MC   05/28/25 1817 0-->no 0-->no -- -- -- 1-->yes 0-->no -- moderate risk RLG            Behavioral status of patient: Green    Code 21 called this shift? No    Use of restraints/seclusion this shift? No    Most recent vital signs:  Temp: 97.8  F (36.6  C)   BP: 124/89 Pulse: 86   Resp: 20 SpO2: 97 % O2 Device: None (Room air)      Medications:  Scheduled medication compliance? N/A    PRN Meds administered this shift? N/A    Medications   apixaban ANTICOAGULANT (ELIQUIS) tablet 5 mg (5 mg Oral $Given 5/28/25 2031)   gabapentin (NEURONTIN) capsule 100 mg (100 mg Oral $Given 5/28/25 2031)   lisinopril (ZESTRIL) tablet 5 mg (has no administration in time range)   methylphenidate HCl ER (OSM) (CONCERTA) CR tablet 36 mg (has no administration in time range)   QUEtiapine (SEROquel) tablet 100 mg (100 mg Oral $Given 5/28/25 2031)   sertraline (ZOLOFT) tablet 100 mg (has no administration in time range)   sertraline (ZOLOFT) tablet 50 mg (has no administration in time range)   tolterodine ER (DETROL LA) 24 hr capsule 4 mg (has no  administration in time range)   melatonin tablet 3 mg (has no administration in time range)         ADLs    Meal Provided this shift? Yes    Hygiene items provided? No    ADLs completed? No    Date of last shower: unknown    Any significant events this shift? No    Any information that would be helpful in caring for this patient?      Family present/updated? No    Location of patient's belongings: with pt    Critical Care Minutes:  Does the patient need critical care minutes documented? No

## 2025-05-29 NOTE — ED NOTES
, Vaishali, with [facility] called to discuss medication concerns. Vaishali states patient continually is pushing back his psych appointment with the newest one supposed to be 6/16/25. Vaishali states his medications he's currently on do not appear to be working. Discussed with her that it will be up to haider what medication changes are made and in the mean time we have him on his home medications, she verbalized understanding. Vaishali also stated she is actively trying to get ahold of patient's guardian, Wes Oconnor, and she will call us when she has a better phone number for him, but that historically he is difficult to get ahold of.    Vaishali also states that she believes patient was triggered by a meeting with his grandparents this last Monday because that is when all of his outbursts began.

## 2025-05-29 NOTE — ED NOTES
Pt asked for door closed.  RN had gotten report that pt was very agitated last night and was agitated easily.  Pt is currently calm and cooperative so RN agreed to have door closed and had  put him on camera.  Sitter is outside room in case he agrees to have his door open.

## 2025-05-29 NOTE — ED NOTES
I received signout from my partner, Dr. Lou.  Please see the initial H&P from Dr. Alba for specifics. Briefly, the patient reportedly broke a ceramic plate and tried to hurt himself. The plate was taken away. The patient then grabbed a plastic knife with suicidal plan.     Seen at [facility] 2 days prior to this ED visit. Notes familial stress related to a wedding, and upset with an individual at his group home.     0906: D/W Fabiana from [facility]. Will continue to work on reaching out to guardian.     1004: D/W Fabiana from [facility] again. Unable to get a hold of guardian. Denies SI. Has therapy appointment for tomorrow.     1428: I emailed the guardian ([email or web address] ).    1430: D/W Wes Oconnor (guardian). Ok with DC to .     Wilbur Alvarez, DO  05/29/25 1429       Wilbur Alvarez, DO  05/29/25 1431

## 2025-05-29 NOTE — ED PROVIDER NOTES
Sign-out Note    Received this patient in sign-out from Dr. Alba at 12:19 AM.  Please refer to earlier documentation detailing presenting complaints, evaluation, and ED course.  In brief, patient is being seen in the ER for suicidal behavior.    Recommendations from previous provider: Awaiting re-evaluation in the morning and collaboration with [facility].  Home meds have been ordered.  Staff from facility are aware.      ED course under my care:  No acute events under my care.  Sign-out to day team    Disposition: Sign-out to day team.         Jaspreet Lou MD  05/29/25 0423
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
## 2025-05-29 NOTE — ED NOTES
"Staff from [facility] called RN, stated she is also unable to get in contact with guardian but is still trying.  RN mentioned pt wanting to call  and staff said that is a common occurrence for pt, states \"he says that all the time when we tell him he has to do something or staff has to go with him somewhere.\" Will call staff with any updates.  "

## 2025-05-29 NOTE — PLAN OF CARE
Octavio Kirkpatrick  May 28, 2025  Plan of Care Hand-off Note     Patient Recommended Care Path: observation (cannot reach guardian; patient remains dysregulated at time of assessment)     Clinical Substantiation:  Patient Nick continues to be dysregulated, angry, and upset.  He feels betrayed and perseverates on these thoughts and feelings in assessment.  His group home staff has concerns about his returning to the group home tonight.  He has not been taking his medications.  He was in an ED two days ago, he was discharged and repeatedly eloped from group home muliple times upon return to group home.  That encounter two days ago is same situation for which patient Nick remains angry this enounter.  ED, writer and  are unable to reach guardian after hours.  Patient will remain in ED until daytime business hours at which time patient will be reassessed and guardian consulted.    Goals for crisis stabilization:  Patient safey; deescalation; decrease in symptoms.    Next steps for Care Team:  Patient will be reassessed tomorrow and guardian/guardian office consulted during business hours.    Treatment Objectives Addressed:  rapport building, processing feelings, building distress tolerance, assessing safety, exploring obstacles to safety in the community    Therapeutic Interventions:  Engaged in cognitive restructuring/ reframing, looked at common cognitive distortions and challenged negative thoughts., Taught the link between thoughts, feelings, and behaviors., Discussed and practiced mindfulness.    Has a specific means been identified for suicidal.homicide actions: No  If yes, describe:    Explain action steps toward mitigation:    Document completion of mitigation action:    The follow up action still needed prior to discharge:      Patient coping skills attempted to reduce the crisis:  Nick went to social media; expressing anger and frustration to multiple sources; eloped from group home,                           Collateral contact information:  Group home  [facility]  and  staff Dax [phone]    Legal Status: Guardian/ad litum ([place] court records have guardianship listed through [facility] but patient has [facility] residence)                                                                                                                                 Reviewed court records: yes     Psychiatry Consult: Not at this time;     Gabby Finley, LICSW

## 2025-05-29 NOTE — ED NOTES
"RN gave pt medications and asked pt if he needed anything else.  Pt took medications willingly with water and stated he did not need anything else.  Pt stated he was trying to get in contact with his , Jasiel Champagne, to \"get another person from the group home out of there.\" Pt stated that another person from the group home lied and said he was \"going to [employer] to kill himself\" and stated he never mentioned anything about [employer].  RN asked pt if he had thoughts about killing or hurting himself and pt denied.  "

## 2025-05-29 NOTE — ED NOTES
RN ED Mental Health Handoff Note    DK    Does patient require 1:1? Yes    Hold and rights been given and documented for patient: Yes    Is the patient in BH scrubs? No -      Has the patient been searched? Yes    Is the 15 minute observation tool up to date? Yes    Was patient issued a welcome folder? No -      Room check completed this shift: Yes    PSS3 and [place] Assessment/Reassessment this shift:    C-SSRS ([place])      Date and Time Q1 Wished to be Dead (Past Month) Q2 Suicidal Thoughts (Past Month) Q3 Suicidal Thought Method Q4 Suicidal Intent without Specific Plan Q5 Suicide Intent with Specific Plan Q6 Suicide Behavior (Lifetime) If yes to Q6, within past 3 months? Level of Risk per Screen Level of Risk per Screen User   05/28/25 1817 0-->no 0-->no -- -- -- 1-->yes 0-->no -- moderate risk RLG            Behavioral status of patient: Green    Code 21 called this shift? No    Use of restraints/seclusion this shift? No    Most recent vital signs:  Temp: 97.8  F (36.6  C)   BP: 124/89 Pulse: 86   Resp: 20 SpO2: 97 % O2 Device: None (Room air)      Medications:  Scheduled medication compliance? Yes    PRN Meds administered this shift? No    Medications   apixaban ANTICOAGULANT (ELIQUIS) tablet 5 mg (5 mg Oral $Given 5/28/25 2031)   gabapentin (NEURONTIN) capsule 100 mg (100 mg Oral $Given 5/28/25 2031)   lisinopril (ZESTRIL) tablet 5 mg (has no administration in time range)   methylphenidate HCl ER (OSM) (CONCERTA) CR tablet 36 mg (has no administration in time range)   QUEtiapine (SEROquel) tablet 100 mg (100 mg Oral $Given 5/28/25 2031)   sertraline (ZOLOFT) tablet 100 mg (has no administration in time range)   sertraline (ZOLOFT) tablet 50 mg (has no administration in time range)   tolterodine ER (DETROL LA) 24 hr capsule 4 mg (has no administration in time range)   melatonin tablet 3 mg (has no administration in time range)         ADLs    Meal Provided this shift? N/A    Hygiene items provided?  Yes    ADLs completed? Yes    Date of last shower: PTA    Any significant events this shift? No    Any information that would be helpful in caring for this patient?  N/a    Family present/updated? N/A group home director updated    Location of patient's belongings: w/patient, phone + clothing    Critical Care Minutes:  Does the patient need critical care minutes documented? No
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
## 2025-05-29 NOTE — ED NOTES
Patient provided nurse with the phone number [phone] stating it belongs to his guardian, Wes Oconnor, with [facility].

## 2025-05-29 NOTE — ED NOTES
Pt ambulated to [facility], requested crackers, ginger ale, and TV remote which writer provided. Pt also asking for phone , writer unable to find at this time.

## 2025-06-05 ENCOUNTER — HOSPITAL ENCOUNTER (EMERGENCY)
Facility: CLINIC | Age: 25
End: 2025-06-05
Attending: EMERGENCY MEDICINE
Payer: COMMERCIAL

## 2025-06-05 VITALS
WEIGHT: 205 LBS | SYSTOLIC BLOOD PRESSURE: 151 MMHG | OXYGEN SATURATION: 97 % | HEIGHT: 74 IN | RESPIRATION RATE: 20 BRPM | TEMPERATURE: 98.2 F | HEART RATE: 81 BPM | DIASTOLIC BLOOD PRESSURE: 89 MMHG | BODY MASS INDEX: 26.31 KG/M2

## 2025-06-05 DIAGNOSIS — J06.9 VIRAL URI: ICD-10-CM

## 2025-06-05 LAB
FLUAV RNA SPEC QL NAA+PROBE: NEGATIVE
FLUBV RNA RESP QL NAA+PROBE: NEGATIVE
RSV RNA SPEC NAA+PROBE: NEGATIVE
SARS-COV-2 RNA RESP QL NAA+PROBE: NEGATIVE

## 2025-06-05 PROCEDURE — 99284 EMERGENCY DEPT VISIT MOD MDM: CPT | Mod: 25

## 2025-06-05 PROCEDURE — 80048 BASIC METABOLIC PNL TOTAL CA: CPT | Performed by: EMERGENCY MEDICINE

## 2025-06-05 PROCEDURE — 250N000013 HC RX MED GY IP 250 OP 250 PS 637: Performed by: EMERGENCY MEDICINE

## 2025-06-05 PROCEDURE — 36415 COLL VENOUS BLD VENIPUNCTURE: CPT | Performed by: EMERGENCY MEDICINE

## 2025-06-05 PROCEDURE — 87637 SARSCOV2&INF A&B&RSV AMP PRB: CPT | Performed by: EMERGENCY MEDICINE

## 2025-06-05 PROCEDURE — 87651 STREP A DNA AMP PROBE: CPT | Performed by: EMERGENCY MEDICINE

## 2025-06-05 RX ORDER — ACETAMINOPHEN 500 MG
1000 TABLET ORAL ONCE
Status: COMPLETED | OUTPATIENT
Start: 2025-06-05 | End: 2025-06-05

## 2025-06-05 RX ADMIN — ACETAMINOPHEN 1000 MG: 500 TABLET ORAL at 23:07

## 2025-06-05 ASSESSMENT — ACTIVITIES OF DAILY LIVING (ADL): ADLS_ACUITY_SCORE: 41

## 2025-06-06 ENCOUNTER — APPOINTMENT (OUTPATIENT)
Dept: GENERAL RADIOLOGY | Facility: CLINIC | Age: 25
End: 2025-06-06
Attending: EMERGENCY MEDICINE
Payer: COMMERCIAL

## 2025-06-06 VITALS
DIASTOLIC BLOOD PRESSURE: 88 MMHG | SYSTOLIC BLOOD PRESSURE: 139 MMHG | WEIGHT: 205 LBS | TEMPERATURE: 98.2 F | OXYGEN SATURATION: 100 % | HEART RATE: 65 BPM | BODY MASS INDEX: 26.31 KG/M2 | HEIGHT: 74 IN | RESPIRATION RATE: 18 BRPM

## 2025-06-06 LAB
ANION GAP SERPL CALCULATED.3IONS-SCNC: 11 MMOL/L (ref 7–15)
BUN SERPL-MCNC: 12.2 MG/DL (ref 6–20)
CALCIUM SERPL-MCNC: 9.5 MG/DL (ref 8.8–10.4)
CHLORIDE SERPL-SCNC: 106 MMOL/L (ref 98–107)
CREAT SERPL-MCNC: 1.33 MG/DL (ref 0.67–1.17)
EGFRCR SERPLBLD CKD-EPI 2021: 76 ML/MIN/1.73M2
GLUCOSE SERPL-MCNC: 96 MG/DL (ref 70–99)
HCO3 SERPL-SCNC: 23 MMOL/L (ref 22–29)
POTASSIUM SERPL-SCNC: 4.5 MMOL/L (ref 3.4–5.3)
S PYO DNA THROAT QL NAA+PROBE: NOT DETECTED
SODIUM SERPL-SCNC: 140 MMOL/L (ref 135–145)

## 2025-06-06 PROCEDURE — 71046 X-RAY EXAM CHEST 2 VIEWS: CPT

## 2025-06-06 NOTE — ED PROVIDER NOTES
"  Emergency Department Note      History of Present Illness     Chief Complaint   Flu Symptoms      HPI   Octavio Kirkpatrick is a 24 year old male       Presenting with concern subjective fever and chills over the last several days to 1 week.  Some associated congestion runny nose mild cough.  No vomiting, diarrhea, dysuria/frequency/urgency.  No new skin changes.  No abdominal pain.  No known sick contacts.  No otalgia.      Independent Historian       Review of External Notes       Past Medical History     Medical History and Problem List   Past Medical History:   Diagnosis Date    ADHD     Anxiety     Asthma 2000    Autistic disorder     Depressive disorder 2008    Fetal alcohol syndrome     Heart murmur     Hernia, abdominal        Medications   albuterol (VENTOLIN HFA) 108 (90 Base) MCG/ACT inhaler  apixaban ANTICOAGULANT (ELIQUIS) 5 MG tablet  Ascorbic Acid 100 MG CHEW  gabapentin (NEURONTIN) 100 MG capsule  lisinopril (ZESTRIL) 5 MG tablet  methylphenidate (CONCERTA) 36 MG CR tablet  QUEtiapine (SEROQUEL) 100 MG tablet  sertraline (ZOLOFT) 100 MG tablet  sertraline (ZOLOFT) 50 MG tablet  tolterodine ER (DETROL LA) 4 MG 24 hr capsule  Vitamin D3 (VITAMIN D) 10 MCG (400 UNIT) tablet        Surgical History   Past Surgical History:   Procedure Laterality Date    INGUINAL HERNIA REPAIR Bilateral     PE TUBES      TONSILLECTOMY & ADENOIDECTOMY      [place] teeth extraction         Physical Exam     Patient Vitals for the past 24 hrs:   BP Temp Temp src Pulse Resp SpO2 Height Weight   06/05/25 2249 (!) 151/89 98.2  F (36.8  C) Oral 81 20 97 % 1.88 m (6' 2\") 93 kg (205 lb)         CV: ppi, regular   Resp: speaking in full sentences without any resp distress, clear to auscultation bilaterally  Skin: warm dry well perfused  Neuro: Alert, no gross motor or sensory deficits,  gait stable      HEENT: Mild posterior oropharyngeal erythema, no significant exudates or hypertrophy.  TMs clear bilaterally    Neck : No " "significant cervical lymphadenopathy, painless range of motion of      Diagnostics     Lab Results   Labs Ordered and Resulted from Time of ED Arrival to Time of ED Departure - No data to display    Imaging   XR Chest 2 Views    (Results Pending)       EKG   ***    Independent Interpretation   {IndependentReview:091512::\"None\"}    ED Course      Medications Administered   Medications   acetaminophen (TYLENOL) tablet 1,000 mg (has no administration in time range)       Procedures   Procedures     Discussion of Management   {Consults/Care Discussions:717561::\"None\"}    ED Course   ED Course as of 06/05/25 2303   Thu Jun 05, 2025   2302 TSH testing done Jan 2025 and normal        Additional Documentation  {EPPAAdditionalPhrase:232046::\"None\"}    Medical Decision Making / Diagnosis     CMS Diagnoses:   {Sepsis/Stemi/Stroke:290925::\"None\"}      MIPS   {EPPA MIPS:528134::\"None\"}       FRANCISCO Kirkpatrick is a 24 year old male ***    Disposition   {EPPAFV Dispo:043378}    Diagnosis   No diagnosis found.     Discharge Medications   New Prescriptions    No medications on file         Nicolas Faulkner MD        " [phone]

## 2025-06-18 ENCOUNTER — OFFICE VISIT (OUTPATIENT)
Dept: CONSULT | Facility: CLINIC | Age: 25
End: 2025-06-18
Attending: MEDICAL GENETICS
Payer: COMMERCIAL

## 2025-06-18 VITALS
HEIGHT: 72 IN | HEART RATE: 67 BPM | SYSTOLIC BLOOD PRESSURE: 126 MMHG | BODY MASS INDEX: 27.23 KG/M2 | DIASTOLIC BLOOD PRESSURE: 86 MMHG | WEIGHT: 201.06 LBS

## 2025-06-18 DIAGNOSIS — F79 INTELLECTUAL DISABILITY: ICD-10-CM

## 2025-06-18 DIAGNOSIS — F84.0 AUTISM: Primary | ICD-10-CM

## 2025-06-18 PROCEDURE — G0463 HOSPITAL OUTPT CLINIC VISIT: HCPCS | Performed by: MEDICAL GENETICS

## 2025-06-18 NOTE — NURSING NOTE
"Chief Complaint   Patient presents with    New Patient       Vitals:    06/18/25 1224   BP: 126/86   BP Location: Right arm   Patient Position: Sitting   Pulse: 67   Weight: 91.2 kg (201 lb 1 oz)   Height: 1.828 m (5' 11.97\")   HC: 59 cm (23.23\")     Patient MyChart Active? Yes Where is the patient located?  If no, would they like to sign up? N/A    What is the patient's phone number or best contact for communication?  [phone]    Does patient need PHQ-2 completed today? Yes    I personally notified the following: visit provider     Kayode Alex  June 18, 2025  "

## 2025-06-18 NOTE — LETTER
"2025      RE: Octavio Kirkpatrick  [address] [facility] [address]  [place] [address]     Dear Colleague,    Thank you for the opportunity to participate in the care of your patient, Octavio Kirkpatrick, at the [facility] at [facility]. Please see a copy of my visit note below.    GENETICS CLINIC EVALUATION / CONSULTATION    Name: Octavio Kirkpatrick  MRN: [id number]  : 2000    Primary Care Provider: Kris Collins MD  Referring Provider: Keenan James MD    Date of service: 2025    Octavio or \"Nick\" was reviewed in Genetics Clinic in person on  by himself.  A caretaker from his group home accompanied him to the appointment but was not present in the room. I was assisted in clinic today by genetic counselor Jihan Shook, MS [facility].  He is a 24-year-old man who comes to clinic for evaluation of autism and undefined intellectual disability. The history was obtained from Nick and his electronic medical record.     Chief Complaint: autism, undefined intellectual disability    Assessment:   Nick is a 24-year-old man who has had multiple neurodevelopmental and psychiatric diagnoses including mild intellectual disability, autism (level-1 or level-2), bipolar disorder, and anxiety.  He also tells me that he has attention problems or did as a child, which I cannot clearly confirm in his medical record.  My overall impression is that the has an intellectual disability, but I suspect that it is closer to 70 than the reported 10 years.  He would like further confirmation of this so I will refer him to adult neuropsych college he.  I advised him that it may take some time to get an appointment.    Next, he has a longstanding diagnosis of fetal alcohol syndrome presumably based on his early minimus abilities and second-had history about his biological mother.  He has no features whatsoever of fetal alcohol " "syndrome on my review, and these do not go away.  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  This is certainly not consistent at all with FAS.  Children with \"fetal alcohol effects, that is some of the neurodevelopmental problems but not the facial changes are known, but even this seems unlikely given his mildly large head size and lack of obvious attention problems.  Thus, I am skeptical regarding his long-standing diagnosis of FAS.    While his neurodevelopmental problems are not specific, both autism and (even mild) intellectual disability will sometimes have associated copy number variants that can be detected on a chromosome microarray.  Knowledge regarding several of these would be important in monitoring his health going forward.  Thus, I recommend a SNP-based chromosome microarray.  After return of results, further follow-up in genetics clinic is not needed.     Plan:    The medical decisions made during this visit include:  1.  Genetic counseling consult to obtain consent for genetic testing (will need to work through guardianship consent).  2.  SNP-based chromosome microarray  3.  No further follow-up needed after results disclosure.    ------------------------------    Family History:   He has family history is largely unknown as he was adopted as a .  However, his biological mother reportedly abused alcohol although no details are available.  Another adopted sibling (no relationship has cerebral palsy and autism.    Social History:   Nick now lives at the [facility] in [place] - with guardianship via [facility] conservators.  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  However, he is supported primarily by the Critical access hospital.    PMH: Pregnancy// History  Little information is available other than his biological mother used alcohol, amount unknown.    PMH: " "Development  Date was not aware of his early developmental milestones.    Past Medical History:   Nick does not recall having learning problems when young, but he was homeschooled from 1st through 8th grades.  He was told he had an \"disability\".  As he transitioned into school in the ninth grade he received more evaluations as his parents suspected features of autism.  Diagnosis was made at that time.    He had an evaluation in pediatric neuropsychology clinic at age 9 years where a WISC-4 was given with the following results:  - - - - - - - - - - - - - - - - - - - - - - - - - - - - - -     COGNITIVE FUNCTIONING:    Wechsler Intelligence Test for Children-Fourth Edition     In order to assess Octavio's cognitive functioning, he was administered the Wechsler Intelligence Scale for Children-Fourth Edition (WISC-IV), a measure of general intellectual functioning that provides four index scores.  His scores from current testing are provided below as a full-scale composite and index scores (standard scores of 85 to 115 define the average range), in addition to the subtests that comprise each index provided as scaled scores (7 to 13 define the average range).         IQ and Composites                       Standard Scores  Verbal Comprehension  67  Perceptual Reasoning  63  Working Memory   71  Processing Speed   59  Full Scale IQ    57  - - - - - - - - - - - - - - - - - - - - - - - - - - - - - -     While his communication skills seem below average, the full-scale result above appears lower than expected for his conversational skills.    In addition, he had an unexplained episode of multiple pulmonary emboli in 2020.  He presented to an emergency department with marked shortness of breath and was found to be markedly tachypneic with heart rates in the 160s and oxygen level in the 70s, even after he was placed on mechanical ventilation. He was in the ICU for several days during which time his respiratory status " "improved and he was taken off epoprostenol and then extubated on 6/17/20. US was concerning for a clot in the right atrium as well as right heart strain. CT PE study on 6/17/2020 showed extensive bilateral pulmonary emboli with right heart strain. He received half-dose tPA and was started on heparin infusion. His tachycardia and oxygen requirements slowly decreased.  Some testing was done, but no underlying cause for his pulmonary emboli were found.    History of Present Illness:   Nick now lives in a group home but is very active in community affairs and writing.  He is followed closely in the psychiatry clinic with multiple diagnoses including history of bipolar disorder, anxiety and autism.  He has also carried a diagnosis of \"fetal alcohol syndrome\".  He is living at a group home noted above with 24-hour supervision available in 2-1 staffing.  He has had episodes of suicidal ideation from time to time, and is under guardianship with [facility].    He requested this evaluation as he is always wanted to understand the cause of his disabilities.  He describes some possible \"savant\" abilities especially remembering what he was doing on many specific individual days over the past several years.  I did not test this specifically.    Review of Systems:   Nick has had numerous medical problems and a complex psychiatric history beyond the scope of this report.  Please refer to his medical record.    Medications:     Current Outpatient Medications   Medication Sig Dispense Refill     albuterol (VENTOLIN HFA) 108 (90 Base) MCG/ACT inhaler Inhale into the lungs.       apixaban ANTICOAGULANT (ELIQUIS) 5 MG tablet Take 1 tablet (5 mg) by mouth 2 times daily. 60 tablet 11     Ascorbic Acid 100 MG CHEW Take 100 mg by mouth daily.       gabapentin (NEURONTIN) 100 MG capsule Take 100 mg by mouth 3 times daily.       lisinopril (ZESTRIL) 5 MG tablet TAKE 1 TABLET BY MOUTH EVERY DAY 28 tablet 4     methylphenidate (CONCERTA) 36 " MG CR tablet Take 36 mg by mouth every morning       QUEtiapine (SEROQUEL) 100 MG tablet Take 100 mg by mouth 3 times daily.       sertraline (ZOLOFT) 100 MG tablet Take 100 mg by mouth daily. Take with 50mg tablet for total dose of 150mg       sertraline (ZOLOFT) 50 MG tablet Take 50 mg by mouth daily. Take with 100mg tablet for total dose of 150mg       tolterodine ER (DETROL LA) 4 MG 24 hr capsule Take 4 mg by mouth daily       Vitamin D3 (VITAMIN D) 10 MCG (400 UNIT) tablet        Allergies:     Allergies   Allergen Reactions     Coconut Flavoring Agent (Non-Screening) GI Disturbance     Dairy Products [Milk Protein]      Gluten Meal      Oatmeal Cramps, Diarrhea, GI Disturbance, Nausea and Other (See Comments)     Red Dye Unknown     Red Dye #40 (Allura Red) Unknown and Other (See Comments)     Tilactase GI Disturbance and Other (See Comments)     Examination:   On exam, his weight is 91.2 kg, height 182.8 cm, and OFC 59 cm (>99%, Z = +2.7).  His BP was 126/86 and pulse 67.  His facial appearance was completely normal with no evidence of fetal alcohol syndrome features including no microcephaly, normal nasal bridge and normal philtrum.  I found no other external congenital anomalies.  His hearing was normal and oropharynx clear.  His back is straight and extremities normally formed.  Further general exam was not performed.  On neurological exam, he was friendly and easily conversed with typical conversational speech. He was able to understand and respond appropriately to all of my questions, definitely better than I would expect from an adult with him IQ of 57.  The remainder of his neurological exam was unremarkable.  Cranial nerve exam showed full eye movements with normal pupils and no nystagmus, as well as symmetric face and jaw movements.  His motor exam showed normal muscle bulk, strength, tone and tendon reflexes.  His stance and gait were also normal.    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    Time:   My evaluation today required 65 minutes of my personal time including medical record review 10 minutes, in person visit 40 minutes, and report preparation 15 minutes.        Nhan Hudson MD  Professor  [facility]  Department of Pediatrics  Division of Genetics and Metabolism      Route to: Patient Care Team:  Kris Collins          Please do not hesitate to contact me if you have any questions/concerns.     Sincerely,       Nhan Hudson MD

## 2025-06-18 NOTE — PROGRESS NOTES
"GENETICS CLINIC EVALUATION / CONSULTATION    Name: Octavio Kirkpatrick  MRN: [id number]  : 2000    Primary Care Provider: Kris Collins MD  Referring Provider: Keenan James MD    Date of service: 2025    Octavio or \"Nick\" was reviewed in Genetics Clinic in person on  by himself.  A caretaker from his group home accompanied him to the appointment but was not present in the room. I was assisted in clinic today by genetic counselor Jihan Shook, MS [facility].  He is a 24-year-old man who comes to clinic for evaluation of autism and undefined intellectual disability. The history was obtained from Nick and his electronic medical record.     Chief Complaint: autism, undefined intellectual disability    Assessment:   Nick is a 24-year-old man who has had multiple neurodevelopmental and psychiatric diagnoses including mild intellectual disability, autism (level-1 or level-2), bipolar disorder, and anxiety.  He also tells me that he has attention problems or did as a child, which I cannot clearly confirm in his medical record.  My overall impression is that the has an intellectual disability, but I suspect that it is closer to 70 than the reported 10 years.  He would like further confirmation of this so I will refer him to adult neuropsych college he.  I advised him that it may take some time to get an appointment.    Next, he has a longstanding diagnosis of fetal alcohol syndrome presumably based on his early minimus abilities and second-had history about his biological mother.  He has no features whatsoever of fetal alcohol syndrome on my review, and these do not go away.  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  This is certainly not consistent at all with FAS.  Children with \"fetal alcohol effects, that is some of the neurodevelopmental problems but not the facial changes are known, but even this seems unlikely given his mildly large head size and lack " "of obvious attention problems.  Thus, I am skeptical regarding his long-standing diagnosis of FAS.    While his neurodevelopmental problems are not specific, both autism and (even mild) intellectual disability will sometimes have associated copy number variants that can be detected on a chromosome microarray.  Knowledge regarding several of these would be important in monitoring his health going forward.  Thus, I recommend a SNP-based chromosome microarray.  After return of results, further follow-up in genetics clinic is not needed.     Plan:    The medical decisions made during this visit include:  1.  Genetic counseling consult to obtain consent for genetic testing (will need to work through guardianship consent).  2.  SNP-based chromosome microarray  3.  No further follow-up needed after results disclosure.    ------------------------------    Family History:   He has family history is largely unknown as he was adopted as a .  However, his biological mother reportedly abused alcohol although no details are available.  Another adopted sibling (no relationship has cerebral palsy and autism.    Social History:   Nick now lives at the [facility] in [place] - with guardianship via [facility] conservators.  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  However, he is supported primarily by the [employer] [facility] [employer].    PMH: Pregnancy// History  Little information is available other than his biological mother used alcohol, amount unknown.    PMH: Development  Date was not aware of his early developmental milestones.    Past Medical History:   Nick does not recall having learning problems when young, but he was homeschooled from 1st through 8th grades.  He was told he had an \"disability\".  As he transitioned into school in the ninth grade he received more evaluations as his parents suspected features of autism. "  Diagnosis was made at that time.    He had an evaluation in pediatric neuropsychology clinic at age 9 years where a WISC-4 was given with the following results:  - - - - - - - - - - - - - - - - - - - - - - - - - - - - - -     COGNITIVE FUNCTIONING:    Wechsler Intelligence Test for Children-Fourth Edition     In order to assess Octavio's cognitive functioning, he was administered the Wechsler Intelligence Scale for Children-Fourth Edition (WISC-IV), a measure of general intellectual functioning that provides four index scores.  His scores from current testing are provided below as a full-scale composite and index scores (standard scores of 85 to 115 define the average range), in addition to the subtests that comprise each index provided as scaled scores (7 to 13 define the average range).         IQ and Composites                       Standard Scores  Verbal Comprehension  67  Perceptual Reasoning  63  Working Memory   71  Processing Speed   59  Full Scale IQ    57  - - - - - - - - - - - - - - - - - - - - - - - - - - - - - -     While his communication skills seem below average, the full-scale result above appears lower than expected for his conversational skills.    In addition, he had an unexplained episode of multiple pulmonary emboli in 2020.  He presented to an emergency department with marked shortness of breath and was found to be markedly tachypneic with heart rates in the 160s and oxygen level in the 70s, even after he was placed on mechanical ventilation. He was in the ICU for several days during which time his respiratory status improved and he was taken off epoprostenol and then extubated on 6/17/20. US was concerning for a clot in the right atrium as well as right heart strain. CT PE study on 6/17/2020 showed extensive bilateral pulmonary emboli with right heart strain. He received half-dose tPA and was started on heparin infusion. His tachycardia and oxygen requirements slowly decreased.  Some  "testing was done, but no underlying cause for his pulmonary emboli were found.    History of Present Illness:   Nick now lives in a group home but is very active in community affairs and writing.  He is followed closely in the psychiatry clinic with multiple diagnoses including history of bipolar disorder, anxiety and autism.  He has also carried a diagnosis of \"fetal alcohol syndrome\".  He is living at a group home noted above with 24-hour supervision available in 2-1 staffing.  He has had episodes of suicidal ideation from time to time, and is under guardianship with [facility].    He requested this evaluation as he is always wanted to understand the cause of his disabilities.  He describes some possible \"savant\" abilities especially remembering what he was doing on many specific individual days over the past several years.  I did not test this specifically.    Review of Systems:   Nick has had numerous medical problems and a complex psychiatric history beyond the scope of this report.  Please refer to his medical record.    Medications:     Current Outpatient Medications   Medication Sig Dispense Refill    albuterol (VENTOLIN HFA) 108 (90 Base) MCG/ACT inhaler Inhale into the lungs.      apixaban ANTICOAGULANT (ELIQUIS) 5 MG tablet Take 1 tablet (5 mg) by mouth 2 times daily. 60 tablet 11    Ascorbic Acid 100 MG CHEW Take 100 mg by mouth daily.      gabapentin (NEURONTIN) 100 MG capsule Take 100 mg by mouth 3 times daily.      lisinopril (ZESTRIL) 5 MG tablet TAKE 1 TABLET BY MOUTH EVERY DAY 28 tablet 4    methylphenidate (CONCERTA) 36 MG CR tablet Take 36 mg by mouth every morning      QUEtiapine (SEROQUEL) 100 MG tablet Take 100 mg by mouth 3 times daily.      sertraline (ZOLOFT) 100 MG tablet Take 100 mg by mouth daily. Take with 50mg tablet for total dose of 150mg      sertraline (ZOLOFT) 50 MG tablet Take 50 mg by mouth daily. Take with 100mg tablet for total dose of 150mg      tolterodine ER (DETROL LA) " 4 MG 24 hr capsule Take 4 mg by mouth daily      Vitamin D3 (VITAMIN D) 10 MCG (400 UNIT) tablet        Allergies:     Allergies   Allergen Reactions    Coconut Flavoring Agent (Non-Screening) GI Disturbance    Dairy Products [Milk Protein]     Gluten Meal     Oatmeal Cramps, Diarrhea, GI Disturbance, Nausea and Other (See Comments)    Red Dye Unknown    Red Dye #40 (Allura Red) Unknown and Other (See Comments)    Tilactase GI Disturbance and Other (See Comments)     Examination:   On exam, his weight is 91.2 kg, height 182.8 cm, and OFC 59 cm (>99%, Z = +2.7).  His BP was 126/86 and pulse 67.  His facial appearance was completely normal with no evidence of fetal alcohol syndrome features including no microcephaly, normal nasal bridge and normal philtrum.  I found no other external congenital anomalies.  His hearing was normal and oropharynx clear.  His back is straight and extremities normally formed.  Further general exam was not performed.  On neurological exam, he was friendly and easily conversed with typical conversational speech. He was able to understand and respond appropriately to all of my questions, definitely better than I would expect from an adult with him IQ of 57.  The remainder of his neurological exam was unremarkable.  Cranial nerve exam showed full eye movements with normal pupils and no nystagmus, as well as symmetric face and jaw movements.  His motor exam showed normal muscle bulk, strength, tone and tendon reflexes.  His stance and gait were also normal.    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    Time:   My evaluation today required 65 minutes of my personal time including medical record review 10 minutes, in person visit 40 minutes, and report preparation 15 minutes.        Nhan Hudson MD  Professor  [employer]  [facility]  Department of Pediatrics  Division of Genetics and Metabolism      Route to: Patient Care Team:  Kris Collins

## 2025-06-18 NOTE — PATIENT INSTRUCTIONS
Genetics  [facility]     Contact our nurse care coordinator Kathryn KAMARAN, RN, PHN at [phone] or send a [employer] message for any non-urgent general or medical questions.     If you had genetic testing and have further questions, please contact the genetic counselor:    Jihan Shook  Ph: [phone]    To schedule appointments:  Pediatric Call Center for [facility]: [phone]  Neuropsychology [phone]   Radiology/ Imaging/Echocardiogram: [phone]   Services:   [phone]     You should receive a phone call about your next appointment. If you do not receive this within two weeks of your visit, please call [phone].     IF REFERRALS WERE PLACED/ DISCUSSED DURING THE VISIT, PLEASE LET OUR TEAM KNOW IF YOU DO NOT HEAR FROM THE SCHEDULERS IN 2 WEEKS    If you have not already done so consider signing up for [employer] by speaking with the person at the  on your way out or go to [email or web address] to sign up online.     [employer] enables easy and confidential communication with your care team.

## 2025-06-28 ENCOUNTER — HOSPITAL ENCOUNTER (EMERGENCY)
Facility: CLINIC | Age: 25
Discharge: NURSING, REHAB OR LONG-TERM CARE | End: 2025-06-28
Attending: EMERGENCY MEDICINE | Admitting: EMERGENCY MEDICINE
Payer: COMMERCIAL

## 2025-06-28 VITALS
TEMPERATURE: 98.5 F | HEART RATE: 59 BPM | DIASTOLIC BLOOD PRESSURE: 77 MMHG | SYSTOLIC BLOOD PRESSURE: 134 MMHG | RESPIRATION RATE: 18 BRPM | OXYGEN SATURATION: 98 %

## 2025-06-28 DIAGNOSIS — R45.89 EMOTIONAL UPSET: ICD-10-CM

## 2025-06-28 PROCEDURE — 99285 EMERGENCY DEPT VISIT HI MDM: CPT

## 2025-06-28 PROCEDURE — 250N000013 HC RX MED GY IP 250 OP 250 PS 637: Performed by: EMERGENCY MEDICINE

## 2025-06-28 RX ORDER — QUETIAPINE FUMARATE 50 MG/1
100 TABLET, FILM COATED ORAL 3 TIMES DAILY
Status: DISCONTINUED | OUTPATIENT
Start: 2025-06-28 | End: 2025-06-28 | Stop reason: HOSPADM

## 2025-06-28 RX ORDER — CLINDAMYCIN PHOSPHATE 10 UG/ML
LOTION TOPICAL 2 TIMES DAILY PRN
COMMUNITY

## 2025-06-28 RX ORDER — LISINOPRIL 5 MG/1
5 TABLET ORAL DAILY
Status: DISCONTINUED | OUTPATIENT
Start: 2025-06-28 | End: 2025-06-28 | Stop reason: HOSPADM

## 2025-06-28 RX ORDER — TOLTERODINE 4 MG/1
4 CAPSULE, EXTENDED RELEASE ORAL DAILY
Status: DISCONTINUED | OUTPATIENT
Start: 2025-06-28 | End: 2025-06-28 | Stop reason: HOSPADM

## 2025-06-28 RX ORDER — METHYLPHENIDATE HYDROCHLORIDE 36 MG/1
36 TABLET ORAL EVERY MORNING
Refills: 0 | Status: DISCONTINUED | OUTPATIENT
Start: 2025-06-28 | End: 2025-06-28

## 2025-06-28 RX ORDER — METHYLPREDNISOLONE 4 MG/1
TABLET ORAL SEE ADMIN INSTRUCTIONS
COMMUNITY

## 2025-06-28 RX ORDER — METHYLPHENIDATE HYDROCHLORIDE 36 MG/1
36 TABLET ORAL EVERY MORNING
Refills: 0 | Status: DISCONTINUED | OUTPATIENT
Start: 2025-06-28 | End: 2025-06-28 | Stop reason: HOSPADM

## 2025-06-28 RX ORDER — SERTRALINE HYDROCHLORIDE 100 MG/1
100 TABLET, FILM COATED ORAL DAILY
Status: DISCONTINUED | OUTPATIENT
Start: 2025-06-28 | End: 2025-06-28 | Stop reason: HOSPADM

## 2025-06-28 RX ADMIN — APIXABAN 5 MG: 5 TABLET, FILM COATED ORAL at 08:50

## 2025-06-28 RX ADMIN — LISINOPRIL 5 MG: 5 TABLET ORAL at 08:50

## 2025-06-28 RX ADMIN — METHYLPHENIDATE HYDROCHLORIDE 36 MG: 36 TABLET, EXTENDED RELEASE ORAL at 09:46

## 2025-06-28 RX ADMIN — SERTRALINE HYDROCHLORIDE 100 MG: 100 TABLET ORAL at 08:50

## 2025-06-28 RX ADMIN — QUETIAPINE FUMARATE 100 MG: 100 TABLET ORAL at 08:50

## 2025-06-28 RX ADMIN — SERTRALINE HYDROCHLORIDE 50 MG: 50 TABLET ORAL at 08:50

## 2025-06-28 RX ADMIN — TOLTERODINE TARTRATE 4 MG: 4 CAPSULE, EXTENDED RELEASE ORAL at 08:50

## 2025-06-28 ASSESSMENT — COLUMBIA-SUICIDE SEVERITY RATING SCALE - C-SSRS
2. HAVE YOU ACTUALLY HAD ANY THOUGHTS OF KILLING YOURSELF IN THE PAST MONTH?: NO
1. IN THE PAST MONTH, HAVE YOU WISHED YOU WERE DEAD OR WISHED YOU COULD GO TO SLEEP AND NOT WAKE UP?: NO
6. HAVE YOU EVER DONE ANYTHING, STARTED TO DO ANYTHING, OR PREPARED TO DO ANYTHING TO END YOUR LIFE?: YES

## 2025-06-28 ASSESSMENT — ACTIVITIES OF DAILY LIVING (ADL)
ADLS_ACUITY_SCORE: 41

## 2025-06-28 NOTE — ED NOTES
Group home address:   [address] [place] [address] 5, [address]  [address]    Phone numbers:  [phone] (night)  [phone] (day) manager

## 2025-06-28 NOTE — ED PROVIDER NOTES
Emergency Department Note      History of Present Illness     Chief Complaint   Psychiatric Evaluation and Suicidal      HPI   Octavio Kirkpatrick is a 24 year old male currently on Eliquis with a history of PE, MANA, bipolar disorder, anxiety, depression, fetal alcohol syndrome, and autism presenting to the Emergency Department via EMS from his group home with group home staff member for psychiatric evaluation. Spoke to [facility] staff member prior to entering room. He reports patient ran from group home earlier today. He then said multiple things suggesting suicidal ideations while they walked together. Patient reports he was blind sided by a sudden move to a different group home in a few days which upset him. He has been dealing with multiple different frustrations regarding guardianship, living situation, and lack of communication. He likes the group home that he currently lives at. He does not desire any medical treatment at this point. Patient took his medications tonight. Denies suicidal ideations. No plan or intent of self harm.     Independent Historian   [facility] staff as detailed above.        Past Medical History     Medical History and Problem List   ADHD  Anxiety  Asthma  Autistic disorder  Depressive disorder  Fetal alcohol syndrome  Heart murmur  Hernia, abdominal  PE  Acute respiratory failure  Psychosis    Medications   Albuterol  Eliquis  Gabapentin  Zestril  Concerta  Seroquel  Zoloft  Detrol  Atarax  Mobic  Abilify  Tenex    Surgical History   Hernia repair  Tonsillectomy, adenoidectomy      Physical Exam     Patient Vitals for the past 24 hrs:   BP Temp Temp src Pulse Resp SpO2   06/28/25 0209 139/84 98.3  F (36.8  C) Tympanic 77 21 97 %     Physical Exam  General: Patient is alert, awake and interactive when I enter the room  Head: The scalp, face, and head appear normal  Eyes: Conjunctivae are normal  ENT: The nose is normal, Pinnae are normal, External acoustic canals are normal  Neck:  Trachea midline  CV: Pulses are normal.   Resp: No respiratory distress   Musc: Normal muscular tone, moving all extremities.  Skin: No rash or lesions noted  Neuro:  Speech is normal and fluent. Face is symmetric.   Psych: Normal affect.  Appropriate interactions.  Patient denies any suicidal homicidal ideation      ED Course      ED Course   ED Course as of 06/28/25 0746   Sat Jun 28, 2025   0208 I obtained history and examined the patient as noted above.         Medical Decision Making / Diagnosis     FRANCISCO Kirkpatrick is a 24 year old male who presents to the emergency department with concern for suicidal ideation.  Group home staff recently informed the patient that he would be moving and the patient is very upset by this sudden turn of events.  Group home staff was initially here with him and reported that patient had been making suicidal comments prompting his visit today.  Patient adamantly denies any suicidal or homicidal ideation.  Denies any intent to harm himself.  He explains that he is very upset about recently finding out that he needs to move in a few days.  He does not have any further complaints at this time.  Patient will be evaluated by her mental health assessment team.  Ultimate disposition will be based on their assessment.    Disposition   Pending DEC assessment     Diagnosis     ICD-10-CM    1. Emotional upset  R45.89          Scribe Disclosure:  I, Esequiel Varma, am serving as a scribe at 2:06 AM on 6/28/2025 to document services personally performed by Zoltan Stephens MD based on my observations and the provider's statements to me.        Zoltan Stephens MD  06/28/25 0746
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
## 2025-06-28 NOTE — ED PROVIDER NOTES
This 24 year old male patient was under my care during my shift from 0800 to time of discharge on 6/28/25.  He is in the emergency department for suicidal statements prior to arrival, now denying SI, awaiting DEC.      1082 I spoke with CORAZON Obrien, after her assessment of the patient. She recommends discharge and the patient's group home is agreeable.     0908 I evaluated the patient who is sitting comfortably on his bed working on his laptop.  He is agreeable to discharge without any safety concerns.  He will return as needed.             Ashley Hodge MD  06/28/25 4311

## 2025-06-28 NOTE — PHARMACY-ADMISSION MEDICATION HISTORY
Pharmacist Admission Medication History    Admission medication history is complete. The information provided in this note is only as accurate as the sources available at the time of the update.    Information Source(s): Patient via in-person    Pertinent Information: None    Changes made to PTA medication list:  Added: clindamycin lotion, solumedrol dosepak  Deleted: None  Changed: None    Allergies reviewed with patient and updates made in EHR: yes    Medication History Completed By: Ban Pritchard RPH 6/28/2025 8:22 AM    PTA Med List   Medication Sig Last Dose/Taking    albuterol (VENTOLIN HFA) 108 (90 Base) MCG/ACT inhaler Inhale into the lungs. Past Month    apixaban ANTICOAGULANT (ELIQUIS) 5 MG tablet Take 1 tablet (5 mg) by mouth 2 times daily. 6/27/2025 at  8:00 AM    Ascorbic Acid 100 MG CHEW Take 100 mg by mouth daily. Past Month    clindamycin (CLEOCIN T) 1 % external lotion Apply topically 2 times daily as needed. Taking As Needed    gabapentin (NEURONTIN) 100 MG capsule Take 100 mg by mouth 3 times daily. 6/27/2025 at  8:00 AM    lisinopril (ZESTRIL) 5 MG tablet TAKE 1 TABLET BY MOUTH EVERY DAY 6/27/2025 at  8:00 AM    methylphenidate (CONCERTA) 36 MG CR tablet Take 36 mg by mouth every morning 6/27/2025 at  8:00 AM    methylPREDNISolone (MEDROL DOSEPAK) 4 MG tablet therapy pack Take by mouth See Admin Instructions. Follow Package Directions 6/27/2025    QUEtiapine (SEROQUEL) 100 MG tablet Take 100 mg by mouth 3 times daily. 6/27/2025 at  8:00 AM    sertraline (ZOLOFT) 100 MG tablet Take 100 mg by mouth daily. Take with 50mg tablet for total dose of 150mg 6/27/2025 at  8:00 AM    sertraline (ZOLOFT) 50 MG tablet Take 50 mg by mouth daily. Take with 100mg tablet for total dose of 150mg 6/27/2025 at  8:00 AM    tolterodine ER (DETROL LA) 4 MG 24 hr capsule Take 4 mg by mouth daily 6/27/2025 at  8:00 AM    Vitamin D3 (VITAMIN D) 10 MCG (400 UNIT) tablet  6/27/2025 at  8:00 AM

## 2025-06-28 NOTE — ED TRIAGE NOTES
"Patient presents tonight from group [facility] in [place] after running away. Patient ran away because of news of having to move to a different home. In the process of this the group home staff that was with him and attempting to re-direct him back to the home states that he made several SI statements such as, \"Don't worry about me I wont be alive tomorrow, I just want to jump in a lake and drown.\" Patient presents here stating that he is stressed but does not endorse any SI or HI thoughts. He also states he has no intent on plan on harming himself.      Triage Assessment (Adult)       Row Name 06/28/25 0214          Triage Assessment    Airway WDL WDL        Respiratory WDL    Respiratory WDL WDL        Skin Circulation/Temperature WDL    Skin Circulation/Temperature WDL WDL        Cardiac WDL    Cardiac WDL WDL        Peripheral/Neurovascular WDL    Peripheral Neurovascular WDL WDL        Cognitive/Neuro/Behavioral WDL    Cognitive/Neuro/Behavioral WDL WDL                     "

## 2025-06-28 NOTE — ED NOTES
RN ED Mental Health Handoff Note    DK    Does patient require 1:1? Yes    Hold and rights been given and documented for patient: Yes    Is the patient in BH scrubs? No -Remains in personal clothing, wanded and searched    Has the patient been searched? Yes    Is the 15 minute observation tool up to date? Yes    Was patient issued a welcome folder? No -NA    Room check completed this shift: Yes    PSS3 and [place] Assessment/Reassessment this shift:    C-SSRS ([place])      Date and Time Q1 Wished to be Dead (Past Month) Q2 Suicidal Thoughts (Past Month) Q3 Suicidal Thought Method Q4 Suicidal Intent without Specific Plan Q5 Suicide Intent with Specific Plan Q6 Suicide Behavior (Lifetime) If yes to Q6, within past 3 months? Level of Risk per Screen Level of Risk per Screen User   06/28/25 0217 0-->no 0-->no -- -- -- 1-->yes 0-->no -- moderate risk ES            Behavioral status of patient: Green    Code 21 called this shift? No    Use of restraints/seclusion this shift? No    Most recent vital signs:  Temp: 98.3  F (36.8  C) Temp src: Tympanic BP: 139/84 Pulse: 77   Resp: 21 SpO2: 97 % O2 Device: None (Room air)      Medications:  Scheduled medication compliance? N/A    PRN Meds administered this shift? No    Medications - No data to display      ADLs    Meal Provided this shift? No    Hygiene items provided? No    ADLs completed? No    Date of last shower: PTA    Any significant events this shift? No    Any information that would be helpful in caring for this patient?  Loves listening to music and talking to others     Family present/updated? No    Location of patient's belongings: At bedside. All searched    Critical Care Minutes:  Does the patient need critical care minutes documented? No

## 2025-06-28 NOTE — ED NOTES
CALLED [facility] -[phone] GAVE REPORT AND ASKED STAFF TO COME  PT, HE IS READY FOR DISCHARGE.

## 2025-06-30 ENCOUNTER — TELEPHONE (OUTPATIENT)
Dept: BEHAVIORAL HEALTH | Facility: CLINIC | Age: 25
End: 2025-06-30
Payer: COMMERCIAL

## 2025-06-30 NOTE — TELEPHONE ENCOUNTER
Triage and Transition Services- Patient Follow Up Call  Service Line Making Phone Call: Telemedicine    Who did Writer Talk to: NA    Details of Call: LVM with DEC queue number.    Joceline Cunningham 6/30/2025 4:15 PM

## 2025-07-15 ENCOUNTER — TELEPHONE (OUTPATIENT)
Dept: CONSULT | Facility: CLINIC | Age: 25
End: 2025-07-15
Payer: COMMERCIAL

## 2025-07-15 NOTE — TELEPHONE ENCOUNTER
Octavio returned my call and left a message. When I returned the call he was not available and a VM was left.    Jihan Shook MS [facility]  Genetic Counselor  Email: [email or web address]  Phone: [phone]

## 2025-07-15 NOTE — TELEPHONE ENCOUNTER
I called Octavio to discuss the results of genetic testing. Our initial consultation occurred on 6/18/2025 where informed consent was obtained for genetic testing. Octavio was not available and a VM requesting a call back was left. An email was also sent to Octavio's legal guardian regarding these results.    The following information has not yet been reviewed with Octavio:   The results of [employer] Chromosome Microarray were negative/normal.     Personal History:   Briefly, Octavio has a history of autism and intellectual disability.     Previous Genetic Testing  Factor 2 and 5 Mutation Analysis - Negative  Patient does not carry the [id number] mutation on either copy of the Factor II (F2) gene.   Patient does not carry the Leiden ([id number]) mutation on either copy of the Factor V (F5) gene.      Genetic counseling notes from previous evaluation at [facility] (7/8/2013) indicate that Fragile X testing was negative/normal. 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     Family History:   A standard three generation pedigree was not obtained. Octavio was adopted in infancy and no biological family history information is known.     Assessment:  These results do not provide an etiology to Octavio's history of autism or intellectual disability. They lower the likelihood of a genetic condition, but cannot exclude it completely.     Plan:  Results to be mailed to Octavio for their records.   No follow-up in genetics recommended at this time.     Jihan Shook MS [facility]  Genetic Counselor  Email: [email or web address]  Phone: [phone]

## 2025-08-01 ENCOUNTER — HOSPITAL ENCOUNTER (EMERGENCY)
Facility: CLINIC | Age: 25
Discharge: HOME | End: 2025-08-01
Attending: PHYSICIAN ASSISTANT | Admitting: PHYSICIAN ASSISTANT
Payer: COMMERCIAL

## 2025-08-01 ENCOUNTER — APPOINTMENT (OUTPATIENT)
Dept: ULTRASOUND IMAGING | Facility: CLINIC | Age: 25
End: 2025-08-01
Attending: PHYSICIAN ASSISTANT
Payer: COMMERCIAL

## 2025-08-01 VITALS
DIASTOLIC BLOOD PRESSURE: 94 MMHG | OXYGEN SATURATION: 100 % | WEIGHT: 205.03 LBS | RESPIRATION RATE: 18 BRPM | SYSTOLIC BLOOD PRESSURE: 145 MMHG | HEART RATE: 65 BPM | BODY MASS INDEX: 26.31 KG/M2 | HEIGHT: 74 IN | TEMPERATURE: 98.1 F

## 2025-08-01 DIAGNOSIS — N45.2 ORCHITIS OF LEFT TESTICLE: Primary | ICD-10-CM

## 2025-08-01 LAB
ALBUMIN UR-MCNC: NEGATIVE MG/DL
APPEARANCE UR: CLEAR
BILIRUB UR QL STRIP: NEGATIVE
COLOR UR AUTO: ABNORMAL
GLUCOSE UR STRIP-MCNC: NEGATIVE MG/DL
HGB UR QL STRIP: NEGATIVE
KETONES UR STRIP-MCNC: NEGATIVE MG/DL
LEUKOCYTE ESTERASE UR QL STRIP: NEGATIVE
NITRATE UR QL: NEGATIVE
PH UR STRIP: 6 [PH] (ref 5–7)
RBC URINE: <1 /HPF
SP GR UR STRIP: 1.01 (ref 1–1.03)
SPERM #/AREA URNS HPF: PRESENT /HPF
UROBILINOGEN UR STRIP-MCNC: NORMAL MG/DL
WBC URINE: 1 /HPF

## 2025-08-01 PROCEDURE — 250N000013 HC RX MED GY IP 250 OP 250 PS 637: Performed by: PHYSICIAN ASSISTANT

## 2025-08-01 PROCEDURE — 99284 EMERGENCY DEPT VISIT MOD MDM: CPT | Mod: 25 | Performed by: PHYSICIAN ASSISTANT

## 2025-08-01 PROCEDURE — 93976 VASCULAR STUDY: CPT

## 2025-08-01 PROCEDURE — 81001 URINALYSIS AUTO W/SCOPE: CPT | Performed by: PHYSICIAN ASSISTANT

## 2025-08-01 PROCEDURE — 87086 URINE CULTURE/COLONY COUNT: CPT | Performed by: PHYSICIAN ASSISTANT

## 2025-08-01 RX ORDER — IBUPROFEN 600 MG/1
600 TABLET, FILM COATED ORAL ONCE
Status: COMPLETED | OUTPATIENT
Start: 2025-08-01 | End: 2025-08-01

## 2025-08-01 RX ORDER — LEVOFLOXACIN 500 MG/1
500 TABLET, FILM COATED ORAL DAILY
Qty: 10 TABLET | Refills: 0 | Status: SHIPPED | OUTPATIENT
Start: 2025-08-01 | End: 2025-08-11

## 2025-08-01 RX ADMIN — IBUPROFEN 600 MG: 600 TABLET ORAL at 15:00

## 2025-08-01 ASSESSMENT — ACTIVITIES OF DAILY LIVING (ADL)
ADLS_ACUITY_SCORE: 41
ADLS_ACUITY_SCORE: 41

## 2025-08-02 LAB — BACTERIA UR CULT: NO GROWTH

## 2025-08-14 ENCOUNTER — HOSPITAL ENCOUNTER (EMERGENCY)
Facility: CLINIC | Age: 25
Discharge: HOME | End: 2025-08-14
Attending: EMERGENCY MEDICINE | Admitting: EMERGENCY MEDICINE
Payer: COMMERCIAL

## 2025-08-14 VITALS
SYSTOLIC BLOOD PRESSURE: 163 MMHG | TEMPERATURE: 98 F | HEART RATE: 93 BPM | RESPIRATION RATE: 16 BRPM | OXYGEN SATURATION: 99 % | DIASTOLIC BLOOD PRESSURE: 100 MMHG

## 2025-08-14 DIAGNOSIS — Z86.59 HISTORY OF IMPULSIVE BEHAVIOR: Primary | ICD-10-CM

## 2025-08-14 PROCEDURE — 99285 EMERGENCY DEPT VISIT HI MDM: CPT | Performed by: EMERGENCY MEDICINE

## 2025-08-14 RX ORDER — OLANZAPINE 5 MG/1
10 TABLET, ORALLY DISINTEGRATING ORAL
Status: DISCONTINUED | OUTPATIENT
Start: 2025-08-14 | End: 2025-08-14 | Stop reason: HOSPADM

## 2025-08-14 ASSESSMENT — COLUMBIA-SUICIDE SEVERITY RATING SCALE - C-SSRS
1. IN THE PAST MONTH, HAVE YOU WISHED YOU WERE DEAD OR WISHED YOU COULD GO TO SLEEP AND NOT WAKE UP?: NO
2. HAVE YOU ACTUALLY HAD ANY THOUGHTS OF KILLING YOURSELF IN THE PAST MONTH?: NO
6. HAVE YOU EVER DONE ANYTHING, STARTED TO DO ANYTHING, OR PREPARED TO DO ANYTHING TO END YOUR LIFE?: NO

## 2025-08-14 ASSESSMENT — ACTIVITIES OF DAILY LIVING (ADL)
ADLS_ACUITY_SCORE: 41